# Patient Record
Sex: FEMALE | Race: WHITE | NOT HISPANIC OR LATINO | Employment: FULL TIME | URBAN - METROPOLITAN AREA
[De-identification: names, ages, dates, MRNs, and addresses within clinical notes are randomized per-mention and may not be internally consistent; named-entity substitution may affect disease eponyms.]

---

## 2017-01-19 ENCOUNTER — HOSPITAL ENCOUNTER (OUTPATIENT)
Facility: MEDICAL CENTER | Age: 52
End: 2017-01-19
Attending: OBSTETRICS & GYNECOLOGY
Payer: COMMERCIAL

## 2017-01-19 PROCEDURE — 88175 CYTOPATH C/V AUTO FLUID REDO: CPT

## 2017-01-19 PROCEDURE — 87624 HPV HI-RISK TYP POOLED RSLT: CPT

## 2017-01-21 LAB
CYTOLOGY REG CYTOL: NORMAL
HPV HR 12 DNA CVX QL NAA+PROBE: NEGATIVE
HPV16 DNA SPEC QL NAA+PROBE: NEGATIVE
HPV18 DNA SPEC QL NAA+PROBE: NEGATIVE
SPECIMEN SOURCE: NORMAL

## 2017-01-30 RX ORDER — MELOXICAM 15 MG/1
TABLET ORAL
Qty: 30 TAB | Refills: 1 | Status: SHIPPED | OUTPATIENT
Start: 2017-01-30 | End: 2017-04-11 | Stop reason: SDUPTHER

## 2017-04-04 RX ORDER — ALBUTEROL SULFATE 90 UG/1
AEROSOL, METERED RESPIRATORY (INHALATION)
Qty: 18 INHALER | Refills: 2 | Status: SHIPPED | OUTPATIENT
Start: 2017-04-04 | End: 2017-12-26 | Stop reason: SDUPTHER

## 2017-04-11 RX ORDER — MELOXICAM 15 MG/1
TABLET ORAL
Qty: 30 TAB | Refills: 5 | Status: SHIPPED | OUTPATIENT
Start: 2017-04-11 | End: 2017-04-26

## 2017-04-11 RX ORDER — LEVOTHYROXINE SODIUM 137 UG/1
TABLET ORAL
Qty: 30 TAB | Refills: 5 | Status: SHIPPED | OUTPATIENT
Start: 2017-04-11 | End: 2017-09-11

## 2017-04-11 RX ORDER — ESCITALOPRAM OXALATE 20 MG/1
TABLET ORAL
Qty: 30 TAB | Refills: 5 | Status: SHIPPED | OUTPATIENT
Start: 2017-04-11 | End: 2017-04-30

## 2017-04-26 ENCOUNTER — OFFICE VISIT (OUTPATIENT)
Dept: INTERNAL MEDICINE | Facility: IMAGING CENTER | Age: 52
End: 2017-04-26
Payer: COMMERCIAL

## 2017-04-26 VITALS
DIASTOLIC BLOOD PRESSURE: 74 MMHG | BODY MASS INDEX: 29.09 KG/M2 | SYSTOLIC BLOOD PRESSURE: 106 MMHG | WEIGHT: 181 LBS | OXYGEN SATURATION: 96 % | TEMPERATURE: 97 F | HEART RATE: 74 BPM | HEIGHT: 66 IN | RESPIRATION RATE: 14 BRPM

## 2017-04-26 DIAGNOSIS — E03.9 HYPOTHYROIDISM, UNSPECIFIED TYPE: ICD-10-CM

## 2017-04-26 DIAGNOSIS — E78.00 ELEVATED CHOLESTEROL: ICD-10-CM

## 2017-04-26 DIAGNOSIS — J45.909 MILD ASTHMA: ICD-10-CM

## 2017-04-26 DIAGNOSIS — Z00.00 ANNUAL PHYSICAL EXAM: ICD-10-CM

## 2017-04-26 DIAGNOSIS — E66.3 OVERWEIGHT: ICD-10-CM

## 2017-04-26 DIAGNOSIS — M54.9 MID BACK PAIN: ICD-10-CM

## 2017-04-26 PROCEDURE — 99396 PREV VISIT EST AGE 40-64: CPT | Performed by: FAMILY MEDICINE

## 2017-04-26 RX ORDER — DIAZEPAM 5 MG/1
5 TABLET ORAL EVERY 8 HOURS PRN
Qty: 30 TAB | Refills: 0 | Status: SHIPPED
Start: 2017-04-26 | End: 2018-04-05

## 2017-04-26 RX ORDER — CYCLOBENZAPRINE HCL 10 MG
10 TABLET ORAL 3 TIMES DAILY PRN
Qty: 30 TAB | Refills: 1 | Status: SHIPPED | OUTPATIENT
Start: 2017-04-26 | End: 2018-04-05

## 2017-04-26 RX ORDER — HYDROCODONE BITARTRATE AND ACETAMINOPHEN 10; 325 MG/1; MG/1
1 TABLET ORAL EVERY 6 HOURS PRN
Qty: 30 TAB | Refills: 0 | Status: SHIPPED | OUTPATIENT
Start: 2017-04-26 | End: 2017-12-06

## 2017-04-26 RX ORDER — ESCITALOPRAM OXALATE 10 MG/1
10 TABLET ORAL DAILY
Qty: 30 TAB | Refills: 1 | Status: SHIPPED | OUTPATIENT
Start: 2017-04-26 | End: 2017-07-24 | Stop reason: SDUPTHER

## 2017-04-26 ASSESSMENT — PATIENT HEALTH QUESTIONNAIRE - PHQ9: CLINICAL INTERPRETATION OF PHQ2 SCORE: 0

## 2017-04-26 NOTE — MR AVS SNAPSHOT
"        Ruby Beverlyhermilo   2017 10:30 AM   Office Visit   MRN: [id number]    Department:  [facility]   Dept Phone:  [phone]    Description:  Female : 1965   Provider:  Magda Wang M.D.           Allergies as of 2017     Allergen Noted Reactions    Cleocin [Clindamycin Hcl] 2009       Pcn [Penicillins] 2009       Pcn [Penicillins] 2011       Percocet [Oxycodone-Acetaminophen] 10/06/2016       itching      You were diagnosed with     Hypothyroidism, unspecified type   [6624419]       Elevated cholesterol   [773322]       Mid back pain   [[id number]]         Vital Signs     Blood Pressure Pulse Temperature Respirations Height Weight    106/74 mmHg 74 36.1 °C (97 °F) 14 1.676 m (5' 5.98\") 82.101 kg (181 lb)    Body Mass Index Oxygen Saturation Smoking Status             29.23 kg/m2 96% Never Smoker          Basic Information     Date Of Birth Sex Race Ethnicity Preferred Language    1965 Female White Non- English      Problem List              ICD-10-CM Priority Class Noted - Resolved    Hypothyroid E03.9   2009 - Present    Depression F32.9   2011 - Present    Obesity E66.9   2016 - Present      Health Maintenance        Date Due Completion Dates    MAMMOGRAM 2015, 3/21/2011, 3/5/2010, 3/5/2010, 2009, 2009, 2008, 2008, 2008    PAP SMEAR 2020, 2014 (Done), 2011 (Done)    Override on 2014: Done (Dr. Gonzalez)    Override on 2011: Done (Dr. Mayen office)    IMM DTaP/Tdap/Td Vaccine (2 - Td) 2024            Current Immunizations     Influenza Vaccine Adult HD 10/28/2014 10:09 AM    Tdap Vaccine 2014      Below and/or attached are the medications your provider expects you to take. Review all of your home medications and newly ordered medications with your provider and/or pharmacist. Follow medication instructions as directed by your provider and/or " pharmacist. Please keep your medication list with you and share with your provider. Update the information when medications are discontinued, doses are changed, or new medications (including over-the-counter products) are added; and carry medication information at all times in the event of emergency situations     Allergies:  CLEOCIN - (reactions not documented)     PCN - (reactions not documented)     PCN - (reactions not documented)     PERCOCET - (reactions not documented)               Medications  Valid as of: April 26, 2017 -  2:22 PM    Generic Name Brand Name Tablet Size Instructions for use    Albuterol Sulfate (Aero Soln) VENTOLIN  (90 BASE) MCG/ACT INHALE TWO PUFFS BY MOUTH EVERY 6 HOURS AS NEEDED FOR SHORTNESS OF BREATH        Cyclobenzaprine HCl (Tab) FLEXERIL 10 MG Take 1 Tab by mouth 3 times a day as needed.        DiazePAM (Tab) VALIUM 5 MG Take 1 Tab by mouth every 8 hours as needed (muscle spasm). As needed for severe spasms  Indications: Muscle Spasm        Diclofenac Sodium (Gel) Diclofenac Sodium 1 % Apply 3g to affected area tid.        Escitalopram Oxalate (Tab) LEXAPRO 20 MG TAKE ONE TABLET BY MOUTH DAILY        Escitalopram Oxalate (Tab) LEXAPRO 10 MG Take 1 Tab by mouth every day.        Hydrocodone-Acetaminophen (Tab) NORCO  MG Take 1 Tab by mouth every 6 hours as needed.        Levothyroxine Sodium (Tab) SYNTHROID 137 MCG TAKE ONE TABLET BY MOUTH DAILY        .                 Medicines prescribed today were sent to:     [id number] - [address] AT [address] [address]    Phone: [phone] Fax: [phone]    Open 24 Hours?: No      Medication refill instructions:       If your prescription bottle indicates you have medication refills left, it is not necessary to call your provider’s office. Please contact your pharmacy and they will refill your medication.    If your prescription bottle indicates you do not have any  refills left, you may request refills at any time through one of the following ways: The online [facility] system (except Urgent Care), by calling your provider’s office, or by asking your pharmacy to contact your provider’s office with a refill request. Medication refills are processed only during regular business hours and may not be available until the next business day. Your provider may request additional information or to have a follow-up visit with you prior to refilling your medication.   *Please Note: Medication refills are assigned a new Rx number when refilled electronically. Your pharmacy may indicate that no refills were authorized even though a new prescription for the same medication is available at the pharmacy. Please request the medicine by name with the pharmacy before contacting your provider for a refill.        Your To Do List     Future Labs/Procedures Complete By Expires    FREE THYROXINE  As directed 4/27/2018    LIPID PROFILE  As directed 4/27/2018    TSH  As directed 4/27/2018      Other Notes About Your Plan     Gyn: Dr. Lisa Andrews in 2014           [employer] Access Code: Activation code not generated  Current [facility] Status: Active

## 2017-04-30 PROBLEM — E78.00 ELEVATED CHOLESTEROL: Status: ACTIVE | Noted: 2017-04-30

## 2017-04-30 PROBLEM — M54.9 MID BACK PAIN: Status: ACTIVE | Noted: 2017-04-30

## 2017-04-30 PROBLEM — J45.909 MILD ASTHMA: Status: ACTIVE | Noted: 2017-04-30

## 2017-04-30 NOTE — PROGRESS NOTES
CC:  Annual exam.    HPI:  Ruby is a 51 y.o.Established female patient here for annual exam.She does see Dr. Gonzalez, and is up-to-date on her GYN exam.She will schedule her mammogram.    She is retired as a . She had lab work prior to retiring through her work and all was fine except her cholesterol is mildly elevated, total cholesterol 211, triglycerides 87, HDL 55, and . She has also been working on exercise and has lost 20 pounds. She is eating healthier.    She does have hypothyroidism. Is on replacement therapy. Denies any palpitations, or constipation.    She complains of bilateral knee pain, left more than right. She has been noticing this more, since she's been exercising. She is doing an exercise program, but has had doing quite a few deep knee bends and lunges. She does have some ongoing intermittent back pain. Rarely uses Norco for Valium for muscle relaxant.    She has been on Lexapro 20 mg for depression and anxiety. She states since stopping her job. She is doing much better. She would like to start tapering the Lexapro. She states her mood is good, motivation is good. She is coping with her stress much better. Her relationship is good. She has good support system.    She also has upper respiratory symptoms for the past 3 days. She does have some mild asthma that is exacerbated by upper respiratory infections. She has been using an albuterol inhaler just occasionally. No shortness of breath. No fevers or chills. Her drainage is clear.    Past Medical History   Diagnosis Date   • Depression    • ASTHMA    • Thyroid disease hyper s/p radioactive   • Hypothyroid 9/20/2009   • Hypothyroid    • Psychiatric problem    • Backpain    • Thoracic vertebral fracture (CMS-HCC) 11/11     trauma       Past Surgical History   Procedure Laterality Date   • Tonsillectomy     • Tubal coagulation laparoscopic bilateral     • Other neurological surg     • Pr reduction of large breast         Family  History   Problem Relation Age of Onset   • Diabetes Mother    • Heart Disease Mother      MI in 60's   • Heart Disease Maternal Grandmother    • Stroke Maternal Grandmother    • Stroke Father      TIA       Social History   Substance Use Topics   • Smoking status: Never Smoker    • Smokeless tobacco: Never Used   • Alcohol Use: Yes      Comment: occ     Counseling given: She has not been drinking alcohol with her weight loss program, and she does feel better.     Patient Active Problem List    Diagnosis Date Noted   • Elevated cholesterol 04/30/2017   • Mid back pain 04/30/2017   • Mild asthma 04/30/2017   • Hypothyroid 09/20/2009     Current Outpatient Prescriptions   Medication Sig Dispense Refill   • hydrocodone/acetaminophen (NORCO)  MG Tab Take 1 Tab by mouth every 6 hours as needed. 30 Tab 0   • escitalopram (LEXAPRO) 10 MG Tab Take 1 Tab by mouth every day. 30 Tab 1   • diazepam (VALIUM) 5 MG Tab Take 1 Tab by mouth every 8 hours as needed (muscle spasm). As needed for severe spasms  Indications: Muscle Spasm 30 Tab 0   • cyclobenzaprine (FLEXERIL) 10 MG Tab Take 1 Tab by mouth 3 times a day as needed. 30 Tab 1   • levothyroxine (SYNTHROID) 137 MCG Tab TAKE ONE TABLET BY MOUTH DAILY 30 Tab 5   • VENTOLIN  (90 BASE) MCG/ACT Aero Soln inhalation aerosol INHALE TWO PUFFS BY MOUTH EVERY 6 HOURS AS NEEDED FOR SHORTNESS OF BREATH 18 Inhaler 2   • Diclofenac Sodium 1 % Gel Apply 3g to affected area tid. 1 Tube 1     No current facility-administered medications for this visit.            REVIEW OF SYSTEMS:  GENERAL: No fatigue, Intentional weight loss.  HEENT:  Ears--no earache, no change in hearing, no dizziness, no tinnitus.                 Eyes--no blurred vision, no discharge or pain                 Throat--Mildsore throat, no dysphagia, no hoarseness  CV:  No chest pain,dyspnea,palpitations or edema.  RESP:  No sob,wheezing or hemoptysis.Nonproductive cough.  GI: No dysphagia, heartburn,abdominal  "pain, nausea, vomiting, diarrhea or constipation.       No melena, jaundice, bleeding, incontinence or change in bowel habits.  :  No dysuria, polyuria, hematuria, incontinence, or nocturia.  MS:Knee pain and back pain.  NEURO:  No seizures, syncope, paralysis, tremor, or weakness.  SKIN: No new or concerning skin lesions or changes.   PSYCH: Mood fine.          /74 mmHg  Pulse 74  Temp(Src) 36.1 °C (97 °F)  Resp 14  Ht 1.676 m (5' 5.98\")  Wt 82.101 kg (181 lb)  BMI 29.23 kg/m2  SpO2 96% Body mass index is 29.23 kg/(m^2).    PHYSICAL EXAM;  GENERAL;  WN/WD, No acute distress.   HEENT:  Head normocephalic and atraumatic.    PERRLA, EOMI. No conjunctival injection, no icterus.     TM's normal, nasal mucosa Is erythematous and edematous.     mouth with no abnormalities.    Oropharynx is clear with no lesions.  NECK: Supple, no adenopathy or thyromegaly.  CV: RR. Normal S1,S2. No murmur, gallop or rub.          No JVD, Carotid pulses 2+ and sym. No bruits.  LUNGS:  Clear, no wheezes, rales or rhonchi.  ABDOMEN: Soft, NT, nondistended. Bowel sounds normal. No hepatosplenomegaly or masses. No rebound or guarding. No hernias.  EXTREMITIES:  No edema.   Knees: There is no swelling or discoloration. She has full  range of motion. No tenderness to palpation. Ligaments are intact  NEURO:  CN II-XII intact. Motor and sensation grossly intact.   SKIN: No rashes or abnormal lesions.  Psych: Mood and affect are appropriate.    Reviewed her labs that she brought with her.  Repeat      Assessment and Plan. The following treatment and monitoring plan is recommended:   1. Annual physical exam  Continue GYN exams annually with Dr. Gonzalez. Encouraged monthly self breast exam and annual mammogram.    2. Hypothyroidism, unspecified type  Continue present replacement therapy.Monitor TSH.  - TSH; Future  - FREE THYROXINE; Future    3. Elevated cholesterol  Monitor lipid panel since she has been exercising and eating " better.  - LIPID PROFILE; Future    4. Mid back pain  Continue activities. Stretching. Rare use of Norco.May use Flexeril or Valium for muscle spasm. She is instructed not to take Valium with day and Norco.  - hydrocodone/acetaminophen (NORCO)  MG Tab; Take 1 Tab by mouth every 6 hours as needed.  Dispense: 30 Tab; Refill: 0  - cyclobenzaprine (FLEXERIL) 10 MG Tab; Take 1 Tab by mouth 3 times a day as needed.  Dispense: 30 Tab; Refill: 1    5. Overweight  Excellent weight loss. Continue efforts of healthy diet and exercise.    6. Mild asthma  Albuterol as needed. Monitor symptoms.    7. Uri. Symptomatic treatment.    8. Depression. We discussed slowly tapering her Lexapro with intent to be off of it. Over the next 6 weeks. She is encouraged to call for any concerns.    9. Bilateral knee pain. Encouraged her to modify her exercises and not to do deep knee bends. It does bother her. Consider physical therapy. She will consider and call.    10. Healthcare Maintenance. Counseled re: nutrition, activity and safety. Reviewed immunizations.     Followup 6 months and as needed.      ·

## 2017-07-24 ENCOUNTER — PATIENT MESSAGE (OUTPATIENT)
Dept: INTERNAL MEDICINE | Facility: IMAGING CENTER | Age: 52
End: 2017-07-24

## 2017-07-24 RX ORDER — ESCITALOPRAM OXALATE 10 MG/1
10 TABLET ORAL DAILY
Qty: 30 TAB | Refills: 6 | Status: SHIPPED | OUTPATIENT
Start: 2017-07-24 | End: 2018-04-05

## 2017-07-24 NOTE — TELEPHONE ENCOUNTER
From: Ruby Sue  To: Magda Wang M.D.  Sent: 7/24/2017 8:51 AM PDT  Subject: Prescription Question    I have found that with my lexapro, 10mg seems to be working the best. Would you change my RX to reflect that. I went down to 5mg but that didn't work.

## 2017-09-09 ENCOUNTER — HOSPITAL ENCOUNTER (OUTPATIENT)
Dept: LAB | Facility: MEDICAL CENTER | Age: 52
End: 2017-09-09
Attending: FAMILY MEDICINE
Payer: COMMERCIAL

## 2017-09-09 DIAGNOSIS — E03.9 HYPOTHYROIDISM, UNSPECIFIED TYPE: ICD-10-CM

## 2017-09-09 DIAGNOSIS — E78.00 ELEVATED CHOLESTEROL: ICD-10-CM

## 2017-09-09 LAB
CHOLEST SERPL-MCNC: 226 MG/DL (ref 100–199)
HDLC SERPL-MCNC: 58 MG/DL
LDLC SERPL CALC-MCNC: 154 MG/DL
T4 FREE SERPL-MCNC: 1.21 NG/DL (ref 0.53–1.43)
TRIGL SERPL-MCNC: 72 MG/DL (ref 0–149)
TSH SERPL DL<=0.005 MIU/L-ACNC: 4.49 UIU/ML (ref 0.3–3.7)

## 2017-09-09 PROCEDURE — 80061 LIPID PANEL: CPT

## 2017-09-09 PROCEDURE — 84443 ASSAY THYROID STIM HORMONE: CPT

## 2017-09-09 PROCEDURE — 84439 ASSAY OF FREE THYROXINE: CPT

## 2017-09-09 PROCEDURE — 36415 COLL VENOUS BLD VENIPUNCTURE: CPT

## 2017-09-11 ENCOUNTER — TELEPHONE (OUTPATIENT)
Dept: INTERNAL MEDICINE | Facility: IMAGING CENTER | Age: 52
End: 2017-09-11

## 2017-09-11 DIAGNOSIS — E03.9 HYPOTHYROIDISM, UNSPECIFIED TYPE: ICD-10-CM

## 2017-09-11 RX ORDER — LEVOTHYROXINE SODIUM 0.15 MG/1
150 TABLET ORAL
Qty: 30 TAB | Refills: 6 | Status: SHIPPED | OUTPATIENT
Start: 2017-09-11 | End: 2018-04-06 | Stop reason: SDUPTHER

## 2017-09-11 NOTE — TELEPHONE ENCOUNTER
Spoke with Maylin. Will increase levothyroxine to 150  micrograms daily and recheck lab work in 3 months.  Lipids are also higher --- counseled regarding diet and exercise, recheck this also in 3 months.

## 2017-11-29 DIAGNOSIS — M25.551 BILATERAL HIP PAIN: ICD-10-CM

## 2017-11-29 DIAGNOSIS — M25.552 BILATERAL HIP PAIN: ICD-10-CM

## 2017-11-30 ENCOUNTER — HOSPITAL ENCOUNTER (OUTPATIENT)
Dept: RADIOLOGY | Facility: MEDICAL CENTER | Age: 52
End: 2017-11-30
Attending: FAMILY MEDICINE
Payer: COMMERCIAL

## 2017-11-30 DIAGNOSIS — M25.552 BILATERAL HIP PAIN: ICD-10-CM

## 2017-11-30 DIAGNOSIS — M25.551 BILATERAL HIP PAIN: ICD-10-CM

## 2017-11-30 PROCEDURE — 73522 X-RAY EXAM HIPS BI 3-4 VIEWS: CPT

## 2017-12-04 ENCOUNTER — APPOINTMENT (OUTPATIENT)
Dept: INTERNAL MEDICINE | Facility: IMAGING CENTER | Age: 52
End: 2017-12-04
Payer: COMMERCIAL

## 2017-12-04 ENCOUNTER — HOSPITAL ENCOUNTER (OUTPATIENT)
Facility: MEDICAL CENTER | Age: 52
End: 2017-12-04
Attending: FAMILY MEDICINE
Payer: COMMERCIAL

## 2017-12-04 DIAGNOSIS — E03.9 HYPOTHYROIDISM, UNSPECIFIED TYPE: ICD-10-CM

## 2017-12-04 LAB
T4 FREE SERPL-MCNC: 1.33 NG/DL (ref 0.53–1.43)
TSH SERPL DL<=0.005 MIU/L-ACNC: 0.25 UIU/ML (ref 0.3–3.7)

## 2017-12-04 PROCEDURE — 84443 ASSAY THYROID STIM HORMONE: CPT

## 2017-12-04 PROCEDURE — 84439 ASSAY OF FREE THYROXINE: CPT

## 2017-12-06 ENCOUNTER — OFFICE VISIT (OUTPATIENT)
Dept: INTERNAL MEDICINE | Facility: IMAGING CENTER | Age: 52
End: 2017-12-06
Payer: COMMERCIAL

## 2017-12-06 VITALS
BODY MASS INDEX: 31.82 KG/M2 | OXYGEN SATURATION: 100 % | RESPIRATION RATE: 14 BRPM | HEIGHT: 66 IN | TEMPERATURE: 96.6 F | HEART RATE: 65 BPM | DIASTOLIC BLOOD PRESSURE: 74 MMHG | SYSTOLIC BLOOD PRESSURE: 102 MMHG | WEIGHT: 198 LBS

## 2017-12-06 DIAGNOSIS — M54.9 MID BACK PAIN: ICD-10-CM

## 2017-12-06 DIAGNOSIS — F33.41 RECURRENT MAJOR DEPRESSIVE DISORDER, IN PARTIAL REMISSION (HCC): ICD-10-CM

## 2017-12-06 DIAGNOSIS — E03.9 HYPOTHYROIDISM, UNSPECIFIED TYPE: ICD-10-CM

## 2017-12-06 DIAGNOSIS — M25.551 BILATERAL HIP PAIN: ICD-10-CM

## 2017-12-06 DIAGNOSIS — Z12.39 SCREENING BREAST EXAMINATION: ICD-10-CM

## 2017-12-06 DIAGNOSIS — M25.552 BILATERAL HIP PAIN: ICD-10-CM

## 2017-12-06 PROCEDURE — 99214 OFFICE O/P EST MOD 30 MIN: CPT | Performed by: FAMILY MEDICINE

## 2017-12-06 RX ORDER — IBUPROFEN 600 MG/1
600 TABLET ORAL 2 TIMES DAILY
Qty: 60 TAB | Refills: 1 | Status: SHIPPED | OUTPATIENT
Start: 2017-12-06 | End: 2018-04-05

## 2017-12-06 RX ORDER — OXYCODONE AND ACETAMINOPHEN 10; 325 MG/1; MG/1
1-2 TABLET ORAL EVERY 6 HOURS PRN
Qty: 30 TAB | Refills: 0 | Status: SHIPPED | OUTPATIENT
Start: 2017-12-06 | End: 2018-04-05

## 2017-12-06 RX ORDER — ESCITALOPRAM OXALATE 20 MG/1
20 TABLET ORAL DAILY
Qty: 30 TAB | Refills: 3 | Status: SHIPPED | OUTPATIENT
Start: 2017-12-06 | End: 2018-06-09 | Stop reason: SDUPTHER

## 2017-12-08 PROBLEM — F33.41 RECURRENT MAJOR DEPRESSIVE DISORDER, IN PARTIAL REMISSION (HCC): Status: ACTIVE | Noted: 2017-12-08

## 2017-12-08 NOTE — PROGRESS NOTES
Chief Complaint   Patient presents with   • Hip Pain       HISTORY OF PRESENT ILLNESS: Patient is a 51 y.o. female established patient who presents today to follow up on hip xrays and labwork.     She has been having more hip pain, R>L.   No new trauma. She did fall about 6 years ago and suffer a fx to T12. Treated conservatively but still some back pain intermittently--especially if she overdoes lifing and when weather colder.   Hips stiff in am's and with sitting for long period.   Advil helps a little.     Hypothyroid. TSH slightly suppressed. No palpitations.   Taking 150mcg. Was low on 137mcg.     Struggles with her weight. Trying to eat healthy . Limited exercise.     Mood: would like to increase lexapro back to 20mg. Hx of depression. Decreased it a few months ago and notices increased irritability, decreased motivation and decreased mood. Denies thoughts of hopelessness or suicide.   Good support system.     Pain: rarely take an opioid for pain in back and now in hips. Was taking percocet and noticed mild itching. Switched to norco and it causes nausea. Would like small rx for when pain bad at night.   Last filled norco April 2017, last filled oxy 10/16.   No aberrant behavior.           Patient Active Problem List    Diagnosis Date Noted   • Recurrent major depressive disorder, in partial remission (CMS-[facility]) 12/08/2017   • Elevated cholesterol 04/30/2017   • Mid back pain 04/30/2017   • Mild asthma 04/30/2017   • Hypothyroid 09/20/2009     Current Outpatient Prescriptions on File Prior to Visit   Medication Sig Dispense Refill   • levothyroxine (SYNTHROID) 150 MCG Tab Take 1 Tab by mouth Every morning on an empty stomach. 30 Tab 6   • escitalopram (LEXAPRO) 10 MG Tab Take 1 Tab by mouth every day. 30 Tab 6   • diazepam (VALIUM) 5 MG Tab Take 1 Tab by mouth every 8 hours as needed (muscle spasm). As needed for severe spasms  Indications: Muscle Spasm--RARE 30 Tab 0   • cyclobenzaprine (FLEXERIL) 10 MG Tab  "Take 1 Tab by mouth 3 times a day as needed. 30 Tab 1   • VENTOLIN  (90 BASE) MCG/ACT Aero Soln inhalation aerosol INHALE TWO PUFFS BY MOUTH EVERY 6 HOURS AS NEEDED FOR SHORTNESS OF BREATH 18 Inhaler 2   • Diclofenac Sodium 1 % Gel Apply 3g to affected area tid. 1 Tube 1     No current facility-administered medications on file prior to visit.          Past medical, surgical, family, and social history is reviewed and updated in Epic chart by me today.   Medications and allergies reviewed and updated in Epic chart by me today.     REVIEW OF SYSTEMS:  GENERAL: No fatigue. Slow weight gain.  CV:  No chest pain,dyspnea,palpitations or edema.  RESP:  No sob,cough,wheezing or hemoptysis.  GI: No dysphagia, heartburn,abdominal pain, nausea, vomiting, diarrhea or constipation.       No melena, jaundice, bleeding, incontinence or change in bowel habits.  :  No dysuria, polyuria, hematuria, incontinence, or nocturia.  MS:  bilat hip pain, low back pain.  NEURO:  No seizures, syncope, paralysis, tremor, or weakness.  SKIN: No new or concerning skin lesions or changes.   PSYCH: Mood fine.    Vitals:    12/06/17 1600   BP: 102/74   Pulse: 65   Resp: 14   Temp: 35.9 °C (96.6 °F)   SpO2: 100%   Weight: 89.8 kg (198 lb)   Height: 1.676 m (5' 5.98\")     Physical Exam:  Gen: overweight female. No acute distress.  Neck:  Supple, no adenopathy or thyromegaly.  Heart:  Regular rate and rhythm.  Normal S1, S2. No murmur, gallop or rub.  Lungs:  Clear, No wheezes,rales or rhonchi.  Back: mild tenderness lower thoracic and lumbar spine and ps muscles bilat.   FROM. Neuro in LE normal and symmetric.   Extremities:  No edema.  Hips: FROM bilat. No tenderness to palpation.  Psych: Mood and affect are appropriate.    Hip xray: Unremarkable.         Assessment/Plan:  1. Bilateral hip pain . Offered PT, she declines. Ibuprofen 600mg bid x 1 month. Gentle exercise and stretches. Weight loss. Refill oxycodone to use 1/2 tab sparingly at " night. Patient is stable on present pain medications. I believe the patient benefits from treatment with opioid medications. We reviewed the goal of opioid therapy is to improve pain as well as function and quality of life improvements. The patient continues to note benefit with current medical regimen. Takes medications as prescribed and displays no aberrant behavior. Will continue on present regimen and follow up 3 months.    oxycodone-acetaminophen (PERCOCET-10)  MG Tab   2. Mid back pain  oxycodone-acetaminophen (PERCOCET-10)  MG Tab   3. Recurrent major depressive disorder, in partial remission (CMS-HCC) . Increase lexapro to 20mg . Follow up 2-3 months.     4. Hypothyroidism, unspecified type. Continue present medication and recheck levels in 3 months.      5. BMI 31.0-31.9,adult . Obesity.  Counselled re: healthy diet, portion control and regular exercise.       6. Screening breast examination  MA-MAMMO SCREENING BILAT W/YESICA W/CAD      followup 2-3 months and prn.

## 2017-12-26 ENCOUNTER — PATIENT MESSAGE (OUTPATIENT)
Dept: INTERNAL MEDICINE | Facility: IMAGING CENTER | Age: 52
End: 2017-12-26

## 2017-12-26 RX ORDER — ALBUTEROL SULFATE 90 UG/1
2 AEROSOL, METERED RESPIRATORY (INHALATION) EVERY 4 HOURS PRN
Qty: 1 INHALER | Refills: 2 | Status: SHIPPED | OUTPATIENT
Start: 2017-12-26 | End: 2019-05-14 | Stop reason: SDUPTHER

## 2017-12-26 NOTE — TELEPHONE ENCOUNTER
From: Ruby Sue  To: Magda Wang M.D.  Sent: 12/26/2017 2:37 PM PST  Subject: Non-Urgent Medical Question    I need to get a refill on my inhaler. It wasn't in the list.

## 2018-01-02 ENCOUNTER — PATIENT MESSAGE (OUTPATIENT)
Dept: INTERNAL MEDICINE | Facility: IMAGING CENTER | Age: 53
End: 2018-01-02

## 2018-01-02 RX ORDER — DOXYCYCLINE HYCLATE 100 MG
100 TABLET ORAL 2 TIMES DAILY
Qty: 20 TAB | Refills: 0 | Status: SHIPPED | OUTPATIENT
Start: 2018-01-02 | End: 2018-04-05

## 2018-01-02 NOTE — TELEPHONE ENCOUNTER
From: Ruby Sue  To: Magda Wang M.D.  Sent: 1/2/2018 8:56 AM PST  Subject: Non-Urgent Medical Question    I have been sick since the week of Thanksgiving. It has just been a cold, but I am thinking maybe it is now a sinus infection. Would it be possible to get some antibiotics?

## 2018-04-05 ENCOUNTER — HOSPITAL ENCOUNTER (OUTPATIENT)
Facility: MEDICAL CENTER | Age: 53
End: 2018-04-05
Attending: FAMILY MEDICINE
Payer: COMMERCIAL

## 2018-04-05 ENCOUNTER — OFFICE VISIT (OUTPATIENT)
Dept: INTERNAL MEDICINE | Facility: IMAGING CENTER | Age: 53
End: 2018-04-05
Payer: COMMERCIAL

## 2018-04-05 VITALS
SYSTOLIC BLOOD PRESSURE: 108 MMHG | HEIGHT: 66 IN | DIASTOLIC BLOOD PRESSURE: 74 MMHG | HEART RATE: 74 BPM | OXYGEN SATURATION: 97 % | WEIGHT: 204 LBS | TEMPERATURE: 97.4 F | RESPIRATION RATE: 14 BRPM | BODY MASS INDEX: 32.78 KG/M2

## 2018-04-05 DIAGNOSIS — Z12.4 SCREENING FOR MALIGNANT NEOPLASM OF CERVIX: ICD-10-CM

## 2018-04-05 DIAGNOSIS — E78.00 ELEVATED CHOLESTEROL: ICD-10-CM

## 2018-04-05 DIAGNOSIS — R53.83 FATIGUE, UNSPECIFIED TYPE: ICD-10-CM

## 2018-04-05 DIAGNOSIS — Z01.419 ENCOUNTER FOR ANNUAL ROUTINE GYNECOLOGICAL EXAMINATION: ICD-10-CM

## 2018-04-05 DIAGNOSIS — Z12.11 SPECIAL SCREENING FOR MALIGNANT NEOPLASMS, COLON: ICD-10-CM

## 2018-04-05 DIAGNOSIS — F33.41 RECURRENT MAJOR DEPRESSIVE DISORDER, IN PARTIAL REMISSION (HCC): ICD-10-CM

## 2018-04-05 DIAGNOSIS — E03.9 HYPOTHYROIDISM, UNSPECIFIED TYPE: ICD-10-CM

## 2018-04-05 DIAGNOSIS — N81.6 RECTOCELE: ICD-10-CM

## 2018-04-05 LAB — CYTOLOGY REG CYTOL: NORMAL

## 2018-04-05 PROCEDURE — 88175 CYTOPATH C/V AUTO FLUID REDO: CPT

## 2018-04-05 PROCEDURE — 99396 PREV VISIT EST AGE 40-64: CPT | Performed by: FAMILY MEDICINE

## 2018-04-05 ASSESSMENT — PATIENT HEALTH QUESTIONNAIRE - PHQ9: CLINICAL INTERPRETATION OF PHQ2 SCORE: 0

## 2018-04-05 NOTE — PROGRESS NOTES
CC: GYN exam and Pap smear.    HPI:  Ruby is a 52 y.o. established female patient here for annual exam. She has been getting Pap smears with Dr. Gonzalez. Her last one was in January 2017. Pap smears have been normal.  Dr. Gonzalez has retired. She is here for her exam today. Periods are still roughly once a month. She states she did go for a 6 month period without menses. No hot flashes or night sweats.    She did notice in the shower about a week ago something that may be protruding from the vagina. She stopped a couple times since. She has had no female surgeries. She does tend a little bit or constipation. States she has a bowel movement every 2-3 days. Usually soft. She does sometimes strain.      Past Medical History:   Diagnosis Date   • ASTHMA    • Backpain    • Depression    • Hypothyroid 9/20/2009   • Hypothyroid    • Psychiatric problem    • Thoracic vertebral fracture (CMS-HCC) 11/11    trauma   • Thyroid disease hyper s/p radioactive       Past Surgical History:   Procedure Laterality Date   • OTHER NEUROLOGICAL SURG     • PB REDUCTION OF LARGE BREAST     • TONSILLECTOMY     • TUBAL COAGULATION LAPAROSCOPIC BILATERAL         Family History   Problem Relation Age of Onset   • Diabetes Mother    • Heart Disease Mother      MI in 60's   • Stroke Father      TIA   • Heart Disease Maternal Grandmother    • Stroke Maternal Grandmother        Social History   Substance Use Topics   • Smoking status: Never Smoker   • Smokeless tobacco: Never Used   • Alcohol use No     Counseling given: Not Answered     Patient Active Problem List    Diagnosis Date Noted   • BMI 32.0-32.9,adult 04/05/2018   • Recurrent major depressive disorder, in partial remission (CMS-HCC) 12/08/2017   • Elevated cholesterol 04/30/2017   • Mid back pain 04/30/2017   • Mild asthma 04/30/2017   • Hypothyroid 09/20/2009     Current Outpatient Prescriptions   Medication Sig Dispense Refill   • escitalopram (LEXAPRO) 20 MG tablet Take 1 Tab by mouth  "every day. 30 Tab 3   • levothyroxine (SYNTHROID) 150 MCG Tab Take 1 Tab by mouth Every morning on an empty stomach. 30 Tab 6   • albuterol (VENTOLIN HFA) 108 (90 Base) MCG/ACT Aero Soln inhalation aerosol Inhale 2 Puffs by mouth every four hours as needed for Shortness of Breath. 1 Inhaler 2   • Diclofenac Sodium 1 % Gel Apply 3g to affected area tid. 1 Tube 1     No current facility-administered medications for this visit.         REVIEW OF SYSTEMS:  GENERAL mild fatigue, no weight loss.  HEENT:  Ears--no earache, no change in hearing, no dizziness, no tinnitus.                 Eyes--no blurred vision, no discharge or pain                 Throat--No sore throat, no dysphagia, no hoarseness  CV:  No chest pain,dyspnea,palpitations or edema.  RESP:  No sob,cough,wheezing or hemoptysis.  GI: No dysphagia, heartburn,abdominal pain, nausea, vomiting, diarrhea or constipation.       No melena, jaundice, bleeding, incontinence or change in bowel habits.  : As above.  MS:  No joint swelling, myalgias, or arthralgias.  NEURO:  No seizures, syncope, paralysis, tremor, or weakness.  SKIN: No new or concerning skin lesions or changes.   PSYCH: Mood fine.--She is taking Lexapro. She feels that it is helping. She is having some marital difficulties. No thoughts of hopelessness or suicide.          /74   Pulse 74   Temp 36.3 °C (97.4 °F)   Resp 14   Ht 1.676 m (5' 5.98\")   Wt 92.5 kg (204 lb)   SpO2 97%   BMI 32.94 kg/m²  Body mass index is 32.94 kg/m².    PHYSICAL EXAM;  GENERAL; overweight female., No acute distress.   HEENT:  Head normocephalic and atraumatic.    PERRLA, EOMI. No conjunctival injection, no icterus.     TM's normal, nasal mucosa and mouth with no abnormalities.    Oropharynx is clear with no lesions.  NECK: Supple, no adenopathy or thyromegaly.  CV: RR. Normal S1,S2. No murmur, gallop or rub.          No JVD, Carotid pulses 2+ and sym. No bruits.  LUNGS:  Clear, no wheezes, rales or " rhonchi.  BREASTS: Symmetric, no masses or tenderness. No nipple discharge.  AXILLA:  No masses or tenderness.  ABDOMEN: Soft, NT, nondistended. Bowel sounds normal. No hepatosplenomegaly or masses. No rebound or guarding. No hernias.  : external genitalia normal with no lesion. Vagina with no lesions or discharge. Cervix nontender with no lesion. Uterus nontender and normal size. Adnexa nontender with no mass. She does have a small rectocele.  EXTREMITIES:  No edema.   NEURO:  CN II-XII intact. Motor and sensation grossly intact.   SKIN: No rashes or abnormal lesions.  Psych: Mood and affect are appropriate.            Assessment and Plan. The following treatment and monitoring plan is recommended:   1. Encounter for annual routine gynecological examination  Pap smear today. Recommend monthly self breast exams, annual mammogram.    2. Screening for malignant neoplasm of cervix      3. Rectocele  Reassurance, observation. Avoid constipation and straining. Kegel exercises daily.    4. Recurrent major depressive disorder, in partial remission (CMS-HCC)  Stable on Lexapro.    5. Elevated cholesterol    - COMP METABOLIC PANEL; Future  - LIPID PROFILE; Future    6. Hypothyroidism, unspecified type  Continue present replacement therapy  - TSH; Future  - FREE THYROXINE; Future    7. Special screening for malignant neoplasms, colon  Referral for colonoscopy.  - REFERRAL TO GASTROENTEROLOGY    8. Fatigue, unspecified type    - CBC WITH DIFFERENTIAL; Future    9. BMI 32.0-32.9,adult    - Patient identified as having weight management issue.  Appropriate orders and counseling given. Counseled regarding diet, exercise    10. Healthcare Maintenance. Counseled re: nutrition, activity and safety. Reviewed immunizations.   She'll follow-up 3 months after labwork.      ·

## 2018-04-06 DIAGNOSIS — E03.9 HYPOTHYROIDISM, UNSPECIFIED TYPE: ICD-10-CM

## 2018-04-06 RX ORDER — LEVOTHYROXINE SODIUM 0.15 MG/1
TABLET ORAL
Qty: 30 TAB | Refills: 5 | Status: SHIPPED | OUTPATIENT
Start: 2018-04-06 | End: 2018-04-27

## 2018-04-24 ENCOUNTER — NON-PROVIDER VISIT (OUTPATIENT)
Dept: INTERNAL MEDICINE | Facility: IMAGING CENTER | Age: 53
End: 2018-04-24
Payer: COMMERCIAL

## 2018-04-24 ENCOUNTER — HOSPITAL ENCOUNTER (OUTPATIENT)
Facility: MEDICAL CENTER | Age: 53
End: 2018-04-24
Attending: FAMILY MEDICINE
Payer: COMMERCIAL

## 2018-04-24 DIAGNOSIS — Z01.89 ROUTINE LAB DRAW: ICD-10-CM

## 2018-04-24 DIAGNOSIS — E03.9 HYPOTHYROIDISM, UNSPECIFIED TYPE: ICD-10-CM

## 2018-04-24 DIAGNOSIS — R53.83 FATIGUE, UNSPECIFIED TYPE: ICD-10-CM

## 2018-04-24 DIAGNOSIS — E78.00 ELEVATED CHOLESTEROL: ICD-10-CM

## 2018-04-24 LAB
ALBUMIN SERPL BCP-MCNC: 3.9 G/DL (ref 3.2–4.9)
ALBUMIN/GLOB SERPL: 1.3 G/DL
ALP SERPL-CCNC: 45 U/L (ref 30–99)
ALT SERPL-CCNC: 14 U/L (ref 2–50)
ANION GAP SERPL CALC-SCNC: 7 MMOL/L (ref 0–11.9)
AST SERPL-CCNC: 19 U/L (ref 12–45)
BASOPHILS # BLD AUTO: 0.5 % (ref 0–1.8)
BASOPHILS # BLD: 0.03 K/UL (ref 0–0.12)
BILIRUB SERPL-MCNC: 0.4 MG/DL (ref 0.1–1.5)
BUN SERPL-MCNC: 13 MG/DL (ref 8–22)
CALCIUM SERPL-MCNC: 8.9 MG/DL (ref 8.5–10.5)
CHLORIDE SERPL-SCNC: 104 MMOL/L (ref 96–112)
CHOLEST SERPL-MCNC: 188 MG/DL (ref 100–199)
CO2 SERPL-SCNC: 24 MMOL/L (ref 20–33)
CREAT SERPL-MCNC: 0.78 MG/DL (ref 0.5–1.4)
EOSINOPHIL # BLD AUTO: 0.02 K/UL (ref 0–0.51)
EOSINOPHIL NFR BLD: 0.4 % (ref 0–6.9)
ERYTHROCYTE [DISTWIDTH] IN BLOOD BY AUTOMATED COUNT: 40.5 FL (ref 35.9–50)
GLOBULIN SER CALC-MCNC: 3 G/DL (ref 1.9–3.5)
GLUCOSE SERPL-MCNC: 92 MG/DL (ref 65–99)
HCT VFR BLD AUTO: 40.7 % (ref 37–47)
HDLC SERPL-MCNC: 51 MG/DL
HGB BLD-MCNC: 13.8 G/DL (ref 12–16)
IMM GRANULOCYTES # BLD AUTO: 0.02 K/UL (ref 0–0.11)
IMM GRANULOCYTES NFR BLD AUTO: 0.4 % (ref 0–0.9)
LDLC SERPL CALC-MCNC: 122 MG/DL
LYMPHOCYTES # BLD AUTO: 1.74 K/UL (ref 1–4.8)
LYMPHOCYTES NFR BLD: 31.6 % (ref 22–41)
MCH RBC QN AUTO: 30.5 PG (ref 27–33)
MCHC RBC AUTO-ENTMCNC: 33.9 G/DL (ref 33.6–35)
MCV RBC AUTO: 90 FL (ref 81.4–97.8)
MONOCYTES # BLD AUTO: 0.47 K/UL (ref 0–0.85)
MONOCYTES NFR BLD AUTO: 8.5 % (ref 0–13.4)
NEUTROPHILS # BLD AUTO: 3.22 K/UL (ref 2–7.15)
NEUTROPHILS NFR BLD: 58.6 % (ref 44–72)
NRBC # BLD AUTO: 0 K/UL
NRBC BLD-RTO: 0 /100 WBC
PLATELET # BLD AUTO: 255 K/UL (ref 164–446)
PMV BLD AUTO: 10.6 FL (ref 9–12.9)
POTASSIUM SERPL-SCNC: 4.5 MMOL/L (ref 3.6–5.5)
PROT SERPL-MCNC: 6.9 G/DL (ref 6–8.2)
RBC # BLD AUTO: 4.52 M/UL (ref 4.2–5.4)
SODIUM SERPL-SCNC: 135 MMOL/L (ref 135–145)
T4 FREE SERPL-MCNC: 1.45 NG/DL (ref 0.53–1.43)
TRIGL SERPL-MCNC: 76 MG/DL (ref 0–149)
TSH SERPL DL<=0.005 MIU/L-ACNC: 0.22 UIU/ML (ref 0.38–5.33)
WBC # BLD AUTO: 5.5 K/UL (ref 4.8–10.8)

## 2018-04-24 PROCEDURE — 85025 COMPLETE CBC W/AUTO DIFF WBC: CPT

## 2018-04-24 PROCEDURE — 80053 COMPREHEN METABOLIC PANEL: CPT

## 2018-04-24 PROCEDURE — 80061 LIPID PANEL: CPT

## 2018-04-24 PROCEDURE — 84443 ASSAY THYROID STIM HORMONE: CPT

## 2018-04-24 PROCEDURE — 84439 ASSAY OF FREE THYROXINE: CPT

## 2018-04-27 RX ORDER — LEVOTHYROXINE SODIUM 137 UG/1
137 TABLET ORAL
Qty: 90 TAB | Refills: 1 | Status: SHIPPED | OUTPATIENT
Start: 2018-04-27 | End: 2018-12-17 | Stop reason: SDUPTHER

## 2018-06-11 RX ORDER — ESCITALOPRAM OXALATE 20 MG/1
TABLET ORAL
Qty: 30 TAB | Refills: 2 | Status: SHIPPED | OUTPATIENT
Start: 2018-06-11 | End: 2018-09-08 | Stop reason: SDUPTHER

## 2018-08-21 ENCOUNTER — PATIENT MESSAGE (OUTPATIENT)
Dept: INTERNAL MEDICINE | Facility: IMAGING CENTER | Age: 53
End: 2018-08-21

## 2018-08-21 DIAGNOSIS — E03.9 HYPOTHYROIDISM, UNSPECIFIED TYPE: ICD-10-CM

## 2018-08-21 NOTE — TELEPHONE ENCOUNTER
From: Ruby Sue  To: Magda Wang M.D.  Sent: 8/21/2018 8:53 AM PDT  Subject: Non-Urgent Medical Question    I think I am due to have my blood work done to check my thyroid again.

## 2018-08-23 ENCOUNTER — HOSPITAL ENCOUNTER (OUTPATIENT)
Dept: LAB | Facility: MEDICAL CENTER | Age: 53
End: 2018-08-23
Attending: FAMILY MEDICINE
Payer: COMMERCIAL

## 2018-08-23 DIAGNOSIS — E03.9 HYPOTHYROIDISM, UNSPECIFIED TYPE: ICD-10-CM

## 2018-08-23 LAB
T4 FREE SERPL-MCNC: 1.04 NG/DL (ref 0.53–1.43)
TSH SERPL DL<=0.005 MIU/L-ACNC: 2.53 UIU/ML (ref 0.38–5.33)

## 2018-08-23 PROCEDURE — 84443 ASSAY THYROID STIM HORMONE: CPT

## 2018-08-23 PROCEDURE — 36415 COLL VENOUS BLD VENIPUNCTURE: CPT

## 2018-08-23 PROCEDURE — 84439 ASSAY OF FREE THYROXINE: CPT

## 2018-09-07 ENCOUNTER — OFFICE VISIT (OUTPATIENT)
Dept: INTERNAL MEDICINE | Facility: IMAGING CENTER | Age: 53
End: 2018-09-07
Payer: COMMERCIAL

## 2018-09-07 VITALS
HEIGHT: 66 IN | TEMPERATURE: 97.6 F | SYSTOLIC BLOOD PRESSURE: 110 MMHG | RESPIRATION RATE: 14 BRPM | OXYGEN SATURATION: 97 % | WEIGHT: 220 LBS | HEART RATE: 69 BPM | DIASTOLIC BLOOD PRESSURE: 64 MMHG | BODY MASS INDEX: 35.36 KG/M2

## 2018-09-07 DIAGNOSIS — N76.0 BACTERIAL VAGINOSIS: ICD-10-CM

## 2018-09-07 DIAGNOSIS — B96.89 BACTERIAL VAGINOSIS: ICD-10-CM

## 2018-09-07 PROCEDURE — 99214 OFFICE O/P EST MOD 30 MIN: CPT | Performed by: FAMILY MEDICINE

## 2018-09-07 RX ORDER — PHENTERMINE HYDROCHLORIDE 37.5 MG/1
37.5 TABLET ORAL
Qty: 30 TAB | Refills: 0 | Status: SHIPPED
Start: 2018-09-07 | End: 2018-10-06 | Stop reason: SDUPTHER

## 2018-09-07 RX ORDER — METRONIDAZOLE 500 MG/1
500 TABLET ORAL 2 TIMES DAILY
Qty: 14 TAB | Refills: 0 | Status: SHIPPED | OUTPATIENT
Start: 2018-09-07 | End: 2018-10-23

## 2018-09-07 NOTE — PROGRESS NOTES
Chief Complaint   Patient presents with   • Obesity   • Other     vaginal odor x 3 months       HISTORY OF PRESENT ILLNESS: Patient is a 52 y.o. female established patient who presents today with concerns about her weight.  She has gained about 30 pounds this past year.  She blames some of it on a job switch.  Her job is much more sedentary now.  She is working for an accounting firm.  She is not exercising regularly.  She feels that she is eating pretty healthy.  She does feel there is some room for improvement.  She has lost weight in the past.  She states she lost about 20 pounds using a boot camp exercise type challenge program.    She is also complaining of vaginal odor for the past 3 months.  Rarely a little bit of discharge.  No other symptoms.  She is in a monogamous relationship.  Not worried about STDs.  Her Pap smear was normal in April of this year.  She denies any urinary symptoms.    Patient Active Problem List    Diagnosis Date Noted   • BMI 32.0-32.9,adult 04/05/2018   • Recurrent major depressive disorder, in partial remission (HCC) 12/08/2017   • Elevated cholesterol 04/30/2017   • Mid back pain 04/30/2017   • Mild asthma 04/30/2017   • Hypothyroid 09/20/2009       Current Outpatient Prescriptions:     •  escitalopram (LEXAPRO) 20 MG tablet, TAKE ONE TABLET BY MOUTH DAILY, Disp: 30 Tab, Rfl: 2  •  levothyroxine (SYNTHROID) 137 MCG Tab, Take 1 Tab by mouth Every morning on an empty stomach., Disp: 90 Tab, Rfl: 1  •  albuterol (VENTOLIN HFA) 108 (90 Base) MCG/ACT Aero Soln inhalation aerosol, Inhale 2 Puffs by mouth every four hours as needed for Shortness of Breath., Disp: 1 Inhaler, Rfl: 2      Past medical, surgical, family, and social history is reviewed and updated in Epic chart by me today.   Medications and allergies reviewed and updated in Epic chart by me today.     REVIEW OF SYSTEMS:  GENERAL: No fatigue.  Weight gain as above.  HEENT:  Ears--no earache, no change in hearing, no dizziness,  "no tinnitus.                 Eyes--no blurred vision, no discharge or pain                 Throat--No sore throat, no dysphagia, no hoarseness  CV:  No chest pain,dyspnea,palpitations or edema.  RESP:  No sob,cough,wheezing or hemoptysis.  GI: No dysphagia, heartburn,abdominal pain, nausea, vomiting, diarrhea or constipation.       No melena, jaundice, bleeding, incontinence or change in bowel habits.  :  No dysuria, polyuria, hematuria, incontinence, or nocturia.  GYN symptoms as above.  MS:  No joint swelling, myalgias, or arthralgias.  NEURO:  No seizures, syncope, paralysis, tremor, or weakness.  SKIN: No new or concerning skin lesions or changes.   PSYCH: Mood fine.    Vitals:    09/07/18 1000   BP: 110/64   Pulse: 69   Resp: 14   Temp: 36.4 °C (97.6 °F)   SpO2: 97%   Weight: 99.8 kg (220 lb)   Height: 1.676 m (5' 5.98\")     Physical Exam:  Gen: Obese female.  No acute distress.  Neck:  Supple, no adenopathy or thyromegaly.  Heart:  Regular rate and rhythm.  Normal S1, S2. No murmur, gallop or rub.  Lungs:  Clear, No wheezes,rales or rhonchi.  Abdomen: Soft, nontender, nondistended. Normal bowel sounds. No hepatosplenomegaly or masses, or hernias. No rebound or guarding.  : external genitalia normal with no lesion. Vagina with no lesions.  She does have a small amount of clear discharge.  Cervix nontender with no lesion. Uterus nontender and normal size. Adnexa nontender with no mass.   Extremities:  No edema.  Psych: Mood and affect are appropriate.          Assessment/Plan:  1. Bacterial vaginosis.  From her symptoms and exam I suspect she has bacterial vaginosis and will treat with a 5 day course of metronidazole 500 mg twice daily.  She is to call for persistent symptoms.    2. BMI 35.0-35.9,adult.  Counseled regarding diet, exercise.  Recommend a 1500-calorie diet, Mediterranean  diet, 30-45 minutes of exercise 4-5 times a week--encouraged her to start with a walking program.  Will add phentermine 37.5 " mg.  One half tablet each morning.  Discussed potential side effects, cautioned against other stimulants.  Follow-up in 6 weeks phentermine (ADIPEX-P) 37.5 MG tablet

## 2018-09-10 RX ORDER — ESCITALOPRAM OXALATE 20 MG/1
TABLET ORAL
Qty: 90 TAB | Refills: 1 | Status: SHIPPED | OUTPATIENT
Start: 2018-09-10 | End: 2019-03-09 | Stop reason: SDUPTHER

## 2018-09-12 ENCOUNTER — OFFICE VISIT (OUTPATIENT)
Dept: URGENT CARE | Facility: PHYSICIAN GROUP | Age: 53
End: 2018-09-12
Payer: COMMERCIAL

## 2018-09-12 ENCOUNTER — HOSPITAL ENCOUNTER (OUTPATIENT)
Dept: RADIOLOGY | Facility: MEDICAL CENTER | Age: 53
End: 2018-09-12
Attending: PHYSICIAN ASSISTANT
Payer: COMMERCIAL

## 2018-09-12 VITALS
WEIGHT: 218 LBS | DIASTOLIC BLOOD PRESSURE: 62 MMHG | HEIGHT: 66 IN | OXYGEN SATURATION: 96 % | BODY MASS INDEX: 35.03 KG/M2 | TEMPERATURE: 98.4 F | HEART RATE: 81 BPM | SYSTOLIC BLOOD PRESSURE: 102 MMHG | RESPIRATION RATE: 16 BRPM

## 2018-09-12 DIAGNOSIS — M79.641 RIGHT HAND PAIN: ICD-10-CM

## 2018-09-12 PROCEDURE — 99214 OFFICE O/P EST MOD 30 MIN: CPT | Performed by: PHYSICIAN ASSISTANT

## 2018-09-12 PROCEDURE — 73130 X-RAY EXAM OF HAND: CPT | Mod: RT

## 2018-09-12 ASSESSMENT — ENCOUNTER SYMPTOMS
WEAKNESS: 0
VOMITING: 0
NECK PAIN: 0
NAUSEA: 0
VERTIGO: 0
MYALGIAS: 1
JOINT SWELLING: 0
VISUAL CHANGE: 0
ABDOMINAL PAIN: 0
NUMBNESS: 0

## 2018-09-13 NOTE — PROGRESS NOTES
"Subjective:      Ruby Sue is a 52 y.o. female who presents with Hand Injury (R hand injury x 6 weeks still painful)            Hand Injury   This is a new problem. The current episode started more than 1 month ago. The problem occurs rarely. The problem has been waxing and waning. Associated symptoms include myalgias. Pertinent negatives include no abdominal pain, joint swelling, nausea, neck pain, numbness, vertigo, visual change, vomiting or weakness. Nothing aggravates the symptoms. She has tried nothing for the symptoms. The treatment provided no relief.     Past medical history: Is not pertinent to this examination  Past surgical history: Not pertinent to this examination  Family history: Is not pertinent to this examination  Allergies:Cleocin [clindamycin hcl]; Pcn [penicillins]; Pcn [penicillins]; and Percocet [oxycodone-acetaminophen]  Social history: Is reviewed in Epic      Review of Systems   Gastrointestinal: Negative for abdominal pain, nausea and vomiting.   Musculoskeletal: Positive for myalgias. Negative for joint swelling and neck pain.   Neurological: Negative for vertigo, weakness and numbness.          Objective:     /62   Pulse 81   Temp 36.9 °C (98.4 °F)   Resp 16   Ht 1.676 m (5' 6\")   Wt 98.9 kg (218 lb)   SpO2 96%   BMI 35.19 kg/m²      Physical Exam   Constitutional: She is oriented to person, place, and time. She appears well-developed and well-nourished.   HENT:   Head: Normocephalic and atraumatic.   Eyes: Pupils are equal, round, and reactive to light. EOM are normal.   Neck: Normal range of motion.   Cardiovascular: Normal rate, regular rhythm and normal heart sounds.    Pulmonary/Chest: Effort normal.   Abdominal: Soft.   Musculoskeletal: Normal range of motion.        Hands:  Patient has some minimal pain to palpation in the dorsal aspect of the right hand in the fifth carpal region. There is no bruising or swelling noted. She has full range of motion of 5 out of " 5 strength against resistance. Her fingers and joints such as MCP and PIP and DIP joints are not affected.  Neurovascular intact with a 2 second cap refill good distal pulses and normal sensation   Neurological: She is alert and oriented to person, place, and time.          Urgent care course: X-ray of the hand is neegative     Assessment/Plan:     1. Right hand pain  Discussed musculoskeletal etiology. Follow-up as needed. Patient has full range of motion and normal strength. Pulses needed  - DX-HAND 3+ RIGHT; Future

## 2018-10-09 RX ORDER — PHENTERMINE HYDROCHLORIDE 37.5 MG/1
TABLET ORAL
Qty: 30 TAB | Refills: 0 | Status: SHIPPED
Start: 2018-10-09 | End: 2018-11-05 | Stop reason: SDUPTHER

## 2018-10-22 ENCOUNTER — HOSPITAL ENCOUNTER (OUTPATIENT)
Dept: RADIOLOGY | Facility: MEDICAL CENTER | Age: 53
End: 2018-10-22
Attending: FAMILY MEDICINE
Payer: COMMERCIAL

## 2018-10-22 DIAGNOSIS — Z12.39 SCREENING BREAST EXAMINATION: ICD-10-CM

## 2018-10-22 PROCEDURE — 77067 SCR MAMMO BI INCL CAD: CPT

## 2018-10-23 ENCOUNTER — OFFICE VISIT (OUTPATIENT)
Dept: INTERNAL MEDICINE | Facility: IMAGING CENTER | Age: 53
End: 2018-10-23
Payer: COMMERCIAL

## 2018-10-23 VITALS
SYSTOLIC BLOOD PRESSURE: 104 MMHG | WEIGHT: 214 LBS | RESPIRATION RATE: 16 BRPM | BODY MASS INDEX: 34.39 KG/M2 | TEMPERATURE: 98.2 F | DIASTOLIC BLOOD PRESSURE: 80 MMHG | OXYGEN SATURATION: 98 % | HEIGHT: 66 IN | HEART RATE: 88 BPM

## 2018-10-23 PROCEDURE — 99212 OFFICE O/P EST SF 10 MIN: CPT | Performed by: FAMILY MEDICINE

## 2018-10-23 NOTE — PROGRESS NOTES
Chief Complaint   Patient presents with   • Obesity     Follow-up on weight loss.       HISTORY OF PRESENT ILLNESS: Patient is a 52 y.o. female established patient who presents today to follow-up on obesity.  She is taking phentermine 37.5 mg.  She states she is taking a whole tablet daily.  She thinks it has helped.  On her scale at home she has lost 10 pounds.  On ours she has only lost 4 but she states with her last weight a few pounds were taken off her clothing.  She is monitoring her food intake.  She does well at breakfast and lunch but states dinner is a bit more erratic and often she is eating it quite late somewhere between 7 and 9.  She also states that sometimes dinner to ends up being more snacks.  She is trying to walk 2-1/2 miles during her lunch hour.  She is not drinking any sodas except an occasional Red BUll.  She is having alcohol a couple times a week.  She admits this last week she did drink more because she had company.      Patient Active Problem List    Diagnosis Date Noted   • BMI 32.0-32.9,adult 04/05/2018   • Recurrent major depressive disorder, in partial remission (HCC) 12/08/2017   • Elevated cholesterol 04/30/2017   • Mid back pain 04/30/2017   • Mild asthma 04/30/2017   • Hypothyroid 09/20/2009     Current Outpatient Prescriptions on File Prior to Visit   Medication Sig Dispense Refill   • phentermine (ADIPEX-P) 37.5 MG tablet TAKE ONE TABLET BY MOUTH EVERY MORNING BEFORE BREAKFAST FOR 30 DAYS 30 Tab 0   • escitalopram (LEXAPRO) 20 MG tablet TAKE ONE TABLET BY MOUTH DAILY 90 Tab 1   • levothyroxine (SYNTHROID) 137 MCG Tab Take 1 Tab by mouth Every morning on an empty stomach. 90 Tab 1   • albuterol (VENTOLIN HFA) 108 (90 Base) MCG/ACT Aero Soln inhalation aerosol Inhale 2 Puffs by mouth every four hours as needed for Shortness of Breath. 1 Inhaler 2     No current facility-administered medications on file prior to visit.          Past medical, surgical, family, and social history is  "reviewed and updated in Epic chart by me today.   Medications and allergies reviewed and updated in Epic chart by me today.     REVIEW OF SYSTEMS:  GENERAL: No fatigue, intentional weight loss.  HEENT:  Ears--no earache, no change in hearing, no dizziness, no tinnitus.                 Eyes--no blurred vision, no discharge or pain                 Throat--No sore throat, no dysphagia, no hoarseness  CV:  No chest pain,dyspnea,palpitations or edema.  RESP:  No sob,cough,wheezing or hemoptysis.  GI: No dysphagia, heartburn,abdominal pain, nausea, vomiting, diarrhea or constipation.       No melena, jaundice, bleeding, incontinence or change in bowel habits.  :  No dysuria, polyuria, hematuria, incontinence, or nocturia.  MS:  No joint swelling, myalgias, or arthralgias.  NEURO:  No seizures, syncope, paralysis, tremor, or weakness.  SKIN: No new or concerning skin lesions or changes.   PSYCH: Mood fine.    Vitals:    10/23/18 0830   BP: 104/80   BP Location: Left arm   Patient Position: Sitting   BP Cuff Size: Adult   Pulse: 88   Resp: 16   Temp: 36.8 °C (98.2 °F)   TempSrc: Temporal   SpO2: 98%   Weight: 97.1 kg (214 lb)   Height: 1.676 m (5' 6\")     Physical Exam:  Gen: overweight female.  No acute distress.  Neck:  Supple, no adenopathy or thyromegaly.  Heart:  Regular rate and rhythm.  Normal S1, S2. No murmur, gallop or rub.  Lungs:  Clear, No wheezes,rales or rhonchi.  Extremities:  No edema.  Psych: Mood and affect are appropriate.          Assessment/Plan:  1. BMI 34.0-34.9,adult     Obesity.  Counselled re: healthy diet, portion control and regular exercise.   We discussed her diet, portion control she will continue her exercise.  She may continue with phentermine.  She will follow-up in 6 weeks.  She is instructed not to drink the red ball or caffeinated beverages with her medication.  Encouraged her to limit her alcohol.  Follow-up here in 6 weeks     "

## 2018-11-05 RX ORDER — PHENTERMINE HYDROCHLORIDE 37.5 MG/1
TABLET ORAL
Qty: 30 TAB | Refills: 0 | Status: SHIPPED
Start: 2018-11-05 | End: 2018-12-05

## 2018-12-17 RX ORDER — LEVOTHYROXINE SODIUM 137 UG/1
TABLET ORAL
Qty: 90 TAB | Refills: 3 | Status: SHIPPED | OUTPATIENT
Start: 2018-12-17 | End: 2019-12-08 | Stop reason: SDUPTHER

## 2019-01-16 ENCOUNTER — OFFICE VISIT (OUTPATIENT)
Dept: INTERNAL MEDICINE | Facility: IMAGING CENTER | Age: 54
End: 2019-01-16
Payer: COMMERCIAL

## 2019-01-16 VITALS
SYSTOLIC BLOOD PRESSURE: 120 MMHG | BODY MASS INDEX: 34.39 KG/M2 | HEART RATE: 74 BPM | TEMPERATURE: 97.5 F | HEIGHT: 66 IN | WEIGHT: 214 LBS | DIASTOLIC BLOOD PRESSURE: 74 MMHG | RESPIRATION RATE: 14 BRPM | OXYGEN SATURATION: 97 %

## 2019-01-16 DIAGNOSIS — R20.2 TINGLING IN EXTREMITIES: ICD-10-CM

## 2019-01-16 DIAGNOSIS — R20.2 TINGLING OF FACE: ICD-10-CM

## 2019-01-16 PROCEDURE — 99213 OFFICE O/P EST LOW 20 MIN: CPT | Performed by: FAMILY MEDICINE

## 2019-01-16 NOTE — PROGRESS NOTES
Chief Complaint   Patient presents with   • Numbness     both arms and right face       HISTORY OF PRESENT ILLNESS: Patient is a 53 y.o. female established patient who presents today Complaining of tingling in her right arm and right lower cheek that started yesterday.  She states she was at lunch and felt some tingling in her right arm that does start at her neck and goes down to her hand.  Also some tingling along her lower cheek on the right.  It has waxed and waned over the past 24 hours.  She has had a little dizziness intermittently.  And then just earlier this morning she felt some tingling in her left arm.  No numbness.  No chest pain.  No palpitations.  No fevers or chills.  No weakness.  No other neurologic symptoms.  She describes the dizziness as brief and feels like she is unsteady.  She has had some sinus symptoms.  She feels some congestion.    No change in medications.  No injuries to her neck.  No head trauma.  She states she had something like this several years ago and had some tests done and no etiology was ever found.      Patient Active Problem List    Diagnosis Date Noted   • BMI 32.0-32.9,adult 04/05/2018   • Recurrent major depressive disorder, in partial remission (HCC) 12/08/2017   • Elevated cholesterol 04/30/2017   • Mid back pain 04/30/2017   • Mild asthma 04/30/2017   • Hypothyroid 09/20/2009     Current Outpatient Prescriptions on File Prior to Visit   Medication Sig Dispense Refill   • levothyroxine (SYNTHROID) 137 MCG Tab TAKE ONE TABLET BY MOUTH EVERY MORNING ON AN EMPTY STOMACH. 90 Tab 3   • escitalopram (LEXAPRO) 20 MG tablet TAKE ONE TABLET BY MOUTH DAILY 90 Tab 1   • albuterol (VENTOLIN HFA) 108 (90 Base) MCG/ACT Aero Soln inhalation aerosol Inhale 2 Puffs by mouth every four hours as needed for Shortness of Breath. 1 Inhaler 2     No current facility-administered medications on file prior to visit.          Past medical, surgical, family, and social history is reviewed and  "updated in Epic chart by me today.   Medications and allergies reviewed and updated in Epic chart by me today.     REVIEW OF SYSTEMS:  GENERAL: No fatigue, no weight loss.  HEENT:  Ears--no earache, no change in hearing, n, no tinnitus. Dizziness.                  Eyes--no blurred vision, no discharge or pain                 Throat--No sore throat, no dysphagia, no hoarseness  CV:  No chest pain,dyspnea,palpitations or edema.  RESP:  No sob,cough,wheezing or hemoptysis.  GI: No dysphagia, heartburn,abdominal pain, nausea, vomiting, diarrhea or constipation.       No melena, jaundice, bleeding, incontinence or change in bowel habits.  :  No dysuria, polyuria, hematuria, incontinence, or nocturia.  MS:  No joint swelling, myalgias, or arthralgias.  NEURO:  No seizures, syncope, paralysis, tremor, or weakness. Tingling as above.  SKIN: No new or concerning skin lesions or changes.   PSYCH: Mood fine.    Vitals:    01/16/19 1300   BP: 120/74   Pulse: 74   Resp: 14   Temp: 36.4 °C (97.5 °F)   TempSrc: Temporal   SpO2: 97%   Weight: 97.1 kg (214 lb)   Height: 1.676 m (5' 5.98\")       Physical Exam:  GENERAL; overweight female. no acute distress.  HEENT:  PERRLA, EOMI, no conjuctival injection, no icterus.                 TM's normal bilat.                 Nasal mucosa normal                 Pharynx clear.  No exudate.  NECK: Supple with no adenopathy or thyromegaly.  No carotid bruits.  Full range of motion of her neck.  Her C-spine is nontender.  LUNGS: Clear with no rales, rhonchi, or wheezes.  COR: RR with no murmur, gallop or rub.  Neuro: Cranial nerves II through XII are intact.  Motor and sensation is grossly intact.  Muscle strength is 5/5 and symmetric in her upper extremities.  Her gait is normal.  Her exam is nonfocal  EXT: No edema.  Psych: Mood and affect are appropriate.            Assessment/Plan:  1. Tingling in extremities     2. Tingling of face     Uncertain etiology.  No evidence of trauma or " hyperventilation.  Encourage adequate fluids.  She may use Nasacort for her dizziness and sinus congestion.  She is to monitor symptoms and call for any changes or if symptoms have not resolved within the next few days.

## 2019-01-31 DIAGNOSIS — E03.9 HYPOTHYROIDISM, UNSPECIFIED TYPE: ICD-10-CM

## 2019-01-31 DIAGNOSIS — B96.89 BACTERIAL VAGINOSIS: ICD-10-CM

## 2019-01-31 DIAGNOSIS — R20.2 TINGLING IN EXTREMITIES: ICD-10-CM

## 2019-01-31 DIAGNOSIS — N76.0 BACTERIAL VAGINOSIS: ICD-10-CM

## 2019-01-31 RX ORDER — METRONIDAZOLE 500 MG/1
500 TABLET ORAL
Qty: 14 TAB | Refills: 0 | Status: SHIPPED | OUTPATIENT
Start: 2019-01-31 | End: 2019-02-07

## 2019-02-01 ENCOUNTER — HOSPITAL ENCOUNTER (OUTPATIENT)
Facility: MEDICAL CENTER | Age: 54
End: 2019-02-01
Attending: FAMILY MEDICINE
Payer: COMMERCIAL

## 2019-02-01 ENCOUNTER — NON-PROVIDER VISIT (OUTPATIENT)
Dept: INTERNAL MEDICINE | Facility: IMAGING CENTER | Age: 54
End: 2019-02-01
Payer: COMMERCIAL

## 2019-02-01 DIAGNOSIS — R20.2 TINGLING IN EXTREMITIES: ICD-10-CM

## 2019-02-01 DIAGNOSIS — E03.9 HYPOTHYROIDISM, UNSPECIFIED TYPE: ICD-10-CM

## 2019-02-01 LAB
25(OH)D3 SERPL-MCNC: 20 NG/ML (ref 30–100)
ALBUMIN SERPL BCP-MCNC: 4.2 G/DL (ref 3.2–4.9)
ALBUMIN/GLOB SERPL: 1.5 G/DL
ALP SERPL-CCNC: 53 U/L (ref 30–99)
ALT SERPL-CCNC: 20 U/L (ref 2–50)
ANION GAP SERPL CALC-SCNC: 7 MMOL/L (ref 0–11.9)
AST SERPL-CCNC: 21 U/L (ref 12–45)
BASOPHILS # BLD AUTO: 0.7 % (ref 0–1.8)
BASOPHILS # BLD: 0.06 K/UL (ref 0–0.12)
BILIRUB SERPL-MCNC: 0.6 MG/DL (ref 0.1–1.5)
BUN SERPL-MCNC: 13 MG/DL (ref 8–22)
CALCIUM SERPL-MCNC: 9.2 MG/DL (ref 8.5–10.5)
CHLORIDE SERPL-SCNC: 103 MMOL/L (ref 96–112)
CO2 SERPL-SCNC: 26 MMOL/L (ref 20–33)
CREAT SERPL-MCNC: 0.75 MG/DL (ref 0.5–1.4)
EOSINOPHIL # BLD AUTO: 0.06 K/UL (ref 0–0.51)
EOSINOPHIL NFR BLD: 0.7 % (ref 0–6.9)
ERYTHROCYTE [DISTWIDTH] IN BLOOD BY AUTOMATED COUNT: 46.4 FL (ref 35.9–50)
ERYTHROCYTE [SEDIMENTATION RATE] IN BLOOD BY WESTERGREN METHOD: 15 MM/HOUR (ref 0–30)
GLOBULIN SER CALC-MCNC: 2.8 G/DL (ref 1.9–3.5)
GLUCOSE SERPL-MCNC: 98 MG/DL (ref 65–99)
HCT VFR BLD AUTO: 48.9 % (ref 37–47)
HGB BLD-MCNC: 15.9 G/DL (ref 12–16)
IMM GRANULOCYTES # BLD AUTO: 0.03 K/UL (ref 0–0.11)
IMM GRANULOCYTES NFR BLD AUTO: 0.3 % (ref 0–0.9)
LYMPHOCYTES # BLD AUTO: 2.4 K/UL (ref 1–4.8)
LYMPHOCYTES NFR BLD: 27.1 % (ref 22–41)
MCH RBC QN AUTO: 31.6 PG (ref 27–33)
MCHC RBC AUTO-ENTMCNC: 32.5 G/DL (ref 33.6–35)
MCV RBC AUTO: 97.2 FL (ref 81.4–97.8)
MONOCYTES # BLD AUTO: 0.64 K/UL (ref 0–0.85)
MONOCYTES NFR BLD AUTO: 7.2 % (ref 0–13.4)
NEUTROPHILS # BLD AUTO: 5.65 K/UL (ref 2–7.15)
NEUTROPHILS NFR BLD: 64 % (ref 44–72)
NRBC # BLD AUTO: 0 K/UL
NRBC BLD-RTO: 0 /100 WBC
PLATELET # BLD AUTO: 238 K/UL (ref 164–446)
PMV BLD AUTO: 10.6 FL (ref 9–12.9)
POTASSIUM SERPL-SCNC: 4.4 MMOL/L (ref 3.6–5.5)
PROT SERPL-MCNC: 7 G/DL (ref 6–8.2)
RBC # BLD AUTO: 5.03 M/UL (ref 4.2–5.4)
SODIUM SERPL-SCNC: 136 MMOL/L (ref 135–145)
T4 FREE SERPL-MCNC: 1.05 NG/DL (ref 0.53–1.43)
TSH SERPL DL<=0.005 MIU/L-ACNC: 2.42 UIU/ML (ref 0.38–5.33)
VIT B12 SERPL-MCNC: 424 PG/ML (ref 211–911)
WBC # BLD AUTO: 8.8 K/UL (ref 4.8–10.8)

## 2019-02-01 PROCEDURE — 84439 ASSAY OF FREE THYROXINE: CPT

## 2019-02-01 PROCEDURE — 80053 COMPREHEN METABOLIC PANEL: CPT

## 2019-02-01 PROCEDURE — 85652 RBC SED RATE AUTOMATED: CPT

## 2019-02-01 PROCEDURE — 82306 VITAMIN D 25 HYDROXY: CPT

## 2019-02-01 PROCEDURE — 85025 COMPLETE CBC W/AUTO DIFF WBC: CPT

## 2019-02-01 PROCEDURE — 84443 ASSAY THYROID STIM HORMONE: CPT

## 2019-02-01 PROCEDURE — 82607 VITAMIN B-12: CPT

## 2019-02-04 ENCOUNTER — TELEPHONE (OUTPATIENT)
Dept: INTERNAL MEDICINE | Facility: IMAGING CENTER | Age: 54
End: 2019-02-04

## 2019-02-04 DIAGNOSIS — R20.2 TINGLING OF RIGHT ARM AND RIGHT SIDE OF FACE: ICD-10-CM

## 2019-02-04 NOTE — TELEPHONE ENCOUNTER
Discussed lab results.  Continues with tingling on right side of face and right UE.   No weakness.     Recommend MRI brain.

## 2019-02-18 ENCOUNTER — HOSPITAL ENCOUNTER (OUTPATIENT)
Dept: RADIOLOGY | Facility: MEDICAL CENTER | Age: 54
End: 2019-02-18
Attending: FAMILY MEDICINE
Payer: COMMERCIAL

## 2019-02-18 DIAGNOSIS — R20.2 TINGLING OF RIGHT ARM AND RIGHT SIDE OF FACE: ICD-10-CM

## 2019-02-18 PROCEDURE — 700117 HCHG RX CONTRAST REV CODE 255: Performed by: FAMILY MEDICINE

## 2019-02-18 PROCEDURE — 70553 MRI BRAIN STEM W/O & W/DYE: CPT

## 2019-02-18 PROCEDURE — A9585 GADOBUTROL INJECTION: HCPCS | Performed by: FAMILY MEDICINE

## 2019-02-18 RX ORDER — GADOBUTROL 604.72 MG/ML
10 INJECTION INTRAVENOUS ONCE
Status: COMPLETED | OUTPATIENT
Start: 2019-02-18 | End: 2019-02-18

## 2019-02-18 RX ADMIN — GADOBUTROL 10 ML: 604.72 INJECTION INTRAVENOUS at 10:13

## 2019-03-11 RX ORDER — ESCITALOPRAM OXALATE 20 MG/1
TABLET ORAL
Qty: 90 TAB | Refills: 3 | Status: SHIPPED | OUTPATIENT
Start: 2019-03-11 | End: 2020-03-13

## 2019-03-15 ENCOUNTER — PATIENT MESSAGE (OUTPATIENT)
Dept: INTERNAL MEDICINE | Facility: IMAGING CENTER | Age: 54
End: 2019-03-15

## 2019-03-15 DIAGNOSIS — M79.672 LEFT FOOT PAIN: ICD-10-CM

## 2019-03-15 NOTE — TELEPHONE ENCOUNTER
From: Ruby Sue  To: Magda Wang M.D.  Sent: 3/15/2019 10:54 AM PDT  Subject: Non-Urgent Medical Question    Do you have a podiatrist you can recommend? I have been having some problems with my left foot that doesn't seem to be getting any better.

## 2019-04-02 ENCOUNTER — PATIENT MESSAGE (OUTPATIENT)
Dept: INTERNAL MEDICINE | Facility: IMAGING CENTER | Age: 54
End: 2019-04-02

## 2019-04-02 DIAGNOSIS — M54.9 MID BACK PAIN: ICD-10-CM

## 2019-04-02 DIAGNOSIS — M62.838 MUSCLE SPASM: ICD-10-CM

## 2019-04-02 RX ORDER — CYCLOBENZAPRINE HCL 10 MG
10 TABLET ORAL 3 TIMES DAILY PRN
Qty: 30 TAB | Refills: 1 | Status: SHIPPED
Start: 2019-04-02 | End: 2020-01-16

## 2019-04-02 NOTE — TELEPHONE ENCOUNTER
From: Ruby Sue  To: Magda Wang M.D.  Sent: 4/2/2019 8:49 AM PDT  Subject: Non-Urgent Medical Question    I wanted to see if I could get some kind of muscle relaxer. My back is killing me, but no like where I hurt it.

## 2019-05-14 ENCOUNTER — HOSPITAL ENCOUNTER (OUTPATIENT)
Facility: MEDICAL CENTER | Age: 54
End: 2019-05-14
Attending: FAMILY MEDICINE
Payer: COMMERCIAL

## 2019-05-14 ENCOUNTER — OFFICE VISIT (OUTPATIENT)
Dept: INTERNAL MEDICINE | Facility: IMAGING CENTER | Age: 54
End: 2019-05-14
Payer: COMMERCIAL

## 2019-05-14 VITALS
WEIGHT: 214 LBS | TEMPERATURE: 98 F | SYSTOLIC BLOOD PRESSURE: 124 MMHG | DIASTOLIC BLOOD PRESSURE: 81 MMHG | HEIGHT: 66 IN | RESPIRATION RATE: 17 BRPM | HEART RATE: 91 BPM | BODY MASS INDEX: 34.39 KG/M2 | OXYGEN SATURATION: 96 %

## 2019-05-14 DIAGNOSIS — N89.8 VAGINAL ODOR: ICD-10-CM

## 2019-05-14 DIAGNOSIS — J30.2 SEASONAL ALLERGIES: ICD-10-CM

## 2019-05-14 DIAGNOSIS — Z00.00 HEALTH CARE MAINTENANCE: ICD-10-CM

## 2019-05-14 DIAGNOSIS — J45.20 MILD INTERMITTENT ASTHMA WITHOUT COMPLICATION: ICD-10-CM

## 2019-05-14 LAB
APPEARANCE UR: CLEAR
BILIRUB UR STRIP-MCNC: NEGATIVE MG/DL
COLOR UR AUTO: YELLOW
GLUCOSE UR STRIP.AUTO-MCNC: NEGATIVE MG/DL
KETONES UR STRIP.AUTO-MCNC: NEGATIVE MG/DL
LEUKOCYTE ESTERASE UR QL STRIP.AUTO: NORMAL
NITRITE UR QL STRIP.AUTO: NEGATIVE
PH UR STRIP.AUTO: 5 [PH] (ref 5–8)
PROT UR QL STRIP: NEGATIVE MG/DL
RBC UR QL AUTO: NORMAL
SP GR UR STRIP.AUTO: 1.02
UROBILINOGEN UR STRIP-MCNC: 0.2 MG/DL

## 2019-05-14 PROCEDURE — 86765 RUBEOLA ANTIBODY: CPT

## 2019-05-14 PROCEDURE — 87086 URINE CULTURE/COLONY COUNT: CPT

## 2019-05-14 PROCEDURE — 99214 OFFICE O/P EST MOD 30 MIN: CPT | Performed by: FAMILY MEDICINE

## 2019-05-14 PROCEDURE — 81002 URINALYSIS NONAUTO W/O SCOPE: CPT | Performed by: FAMILY MEDICINE

## 2019-05-14 RX ORDER — ALBUTEROL SULFATE 90 UG/1
2 AEROSOL, METERED RESPIRATORY (INHALATION) EVERY 4 HOURS PRN
Qty: 1 INHALER | Refills: 3 | Status: SHIPPED | OUTPATIENT
Start: 2019-05-14 | End: 2020-12-10 | Stop reason: SDUPTHER

## 2019-05-15 DIAGNOSIS — N89.8 VAGINAL ODOR: ICD-10-CM

## 2019-05-15 DIAGNOSIS — Z00.00 HEALTH CARE MAINTENANCE: ICD-10-CM

## 2019-05-15 LAB — MEV IGG SER IA-ACNC: 3.13

## 2019-05-15 NOTE — PROGRESS NOTES
"Chief Complaint   Patient presents with   • Female  Problem     Odor in urine and \"underwear are always wet\". Has had this before and it has gone away before with Abx use.        HPI:  Patient is a 53 y.o. female established patient of Dr. Wang who presents today to discuss recurrent vaginal odor issue present since 2018. She reports smelling ammonia when she urinates and denies associated overt vaginal discharge/itching nor acute systemic illness. She feels that her underwear is wet with clear fluid when this condition is present and denies history of STD/ high risk behaviors. She denies fish odor nor grey/white vaginal discharge during these episodes. She has been treated with Flagyl twice for presumed BV with improvement of symptoms and currently denies dysuria. She also has concerns about measles immunity and would like to have titer drawn today. She also has flair of seasonal allergies and is seeing her allergist next week. She has been using her albuterol inhaler PRN and would like to have a refill sent to her pharmacy. She is otherwise stable at this time and is in good spirits.     Patient Active Problem List    Diagnosis Date Noted   • Seasonal allergies 05/14/2019   • BMI 32.0-32.9,adult 04/05/2018   • Recurrent major depressive disorder, in partial remission (HCC) 12/08/2017   • Elevated cholesterol 04/30/2017   • Mid back pain 04/30/2017   • Mild asthma 04/30/2017   • Hypothyroid 09/20/2009       Past medical, surgical, family, and social history was reviewed in Epic chart by me today.     Medications and allergies reviewed and updated in Epic chart by me today.     ROS:  Pertinent positives listed above in HPI. All other systems have been reviewed and are negative.    PE:   /81 (BP Location: Left arm, Patient Position: Sitting, BP Cuff Size: Adult)   Pulse 91   Temp 36.7 °C (98 °F) (Temporal)   Resp 17   Ht 1.676 m (5' 5.98\")   Wt 97.1 kg (214 lb)   SpO2 96%   BMI 34.56 kg/m²   Vital " signs reviewed with patient.     Gen: Well developed; well nourished; no acute distress; age appropriate appearance   Extremities: No peripheral edema b/l LE extremities/ no clubbing nor cyanosis noted  Skin: Warm and dry; no rashes noted   Neuro: No focal deficits noted   Psych: AAOx4; mood and affect are appropriate  Pt declines vaginal exam today.     A/P:  1. Health care maintenance  Pt is requesting measles titer to be drawn today to confirm immunity.   - RUBEOLA IGG AB; Future    2. Vaginal odor  POC UA positive for trace blood and small LE. Will send urine for culture and follow up accordingly. BV remains an ongoing consideration.   - URINE CULTURE(NEW); Future  - POCT Urinalysis    3. Mild intermittent asthma without complication  Pt is using albuterol inhaler PRN during current allergy flair. Pt is to continue PRN inhaler use and new RX sent to pharmacy.   - albuterol (VENTOLIN HFA) 108 (90 Base) MCG/ACT Aero Soln inhalation aerosol; Inhale 2 Puffs by mouth every four hours as needed for Shortness of Breath.  Dispense: 1 Inhaler; Refill: 3    4. Seasonal allergies  Ongoing issue for patient not controlled with multiple OTC medication use. She has appointment next week with her allergist.       I will be in touch with lab results when available, and she is to continue follow up with Dr. Wang as needed.

## 2019-05-17 LAB
BACTERIA UR CULT: NORMAL
SIGNIFICANT IND 70042: NORMAL
SITE SITE: NORMAL
SOURCE SOURCE: NORMAL

## 2019-05-17 RX ORDER — METRONIDAZOLE 500 MG/1
500 TABLET ORAL 2 TIMES DAILY
Qty: 28 TAB | Refills: 0 | Status: SHIPPED | OUTPATIENT
Start: 2019-05-17 | End: 2019-05-31

## 2019-12-09 RX ORDER — LEVOTHYROXINE SODIUM 137 UG/1
TABLET ORAL
Qty: 30 TAB | Refills: 2 | Status: SHIPPED | OUTPATIENT
Start: 2019-12-09 | End: 2020-03-09

## 2020-01-13 ENCOUNTER — HOSPITAL ENCOUNTER (OUTPATIENT)
Facility: MEDICAL CENTER | Age: 55
End: 2020-01-13
Attending: FAMILY MEDICINE
Payer: COMMERCIAL

## 2020-01-13 ENCOUNTER — NON-PROVIDER VISIT (OUTPATIENT)
Dept: INTERNAL MEDICINE | Facility: IMAGING CENTER | Age: 55
End: 2020-01-13
Payer: COMMERCIAL

## 2020-01-13 DIAGNOSIS — E03.9 HYPOTHYROIDISM, UNSPECIFIED TYPE: ICD-10-CM

## 2020-01-13 DIAGNOSIS — E55.9 VITAMIN D DEFICIENCY: ICD-10-CM

## 2020-01-13 DIAGNOSIS — Z00.00 PREVENTATIVE HEALTH CARE: ICD-10-CM

## 2020-01-13 LAB
25(OH)D3 SERPL-MCNC: 12 NG/ML (ref 30–100)
ALBUMIN SERPL BCP-MCNC: 4.2 G/DL (ref 3.2–4.9)
ALBUMIN/GLOB SERPL: 1.5 G/DL
ALP SERPL-CCNC: 60 U/L (ref 30–99)
ALT SERPL-CCNC: 25 U/L (ref 2–50)
ANION GAP SERPL CALC-SCNC: 9 MMOL/L (ref 0–11.9)
AST SERPL-CCNC: 21 U/L (ref 12–45)
BASOPHILS # BLD AUTO: 0.7 % (ref 0–1.8)
BASOPHILS # BLD: 0.04 K/UL (ref 0–0.12)
BILIRUB SERPL-MCNC: 1.1 MG/DL (ref 0.1–1.5)
BUN SERPL-MCNC: 9 MG/DL (ref 8–22)
CALCIUM SERPL-MCNC: 9.3 MG/DL (ref 8.5–10.5)
CHLORIDE SERPL-SCNC: 106 MMOL/L (ref 96–112)
CHOLEST SERPL-MCNC: 200 MG/DL (ref 100–199)
CO2 SERPL-SCNC: 24 MMOL/L (ref 20–33)
CREAT SERPL-MCNC: 0.81 MG/DL (ref 0.5–1.4)
EOSINOPHIL # BLD AUTO: 0.06 K/UL (ref 0–0.51)
EOSINOPHIL NFR BLD: 1.1 % (ref 0–6.9)
ERYTHROCYTE [DISTWIDTH] IN BLOOD BY AUTOMATED COUNT: 45.9 FL (ref 35.9–50)
GLOBULIN SER CALC-MCNC: 2.8 G/DL (ref 1.9–3.5)
GLUCOSE SERPL-MCNC: 111 MG/DL (ref 65–99)
HCT VFR BLD AUTO: 47.2 % (ref 37–47)
HDLC SERPL-MCNC: 54 MG/DL
HGB BLD-MCNC: 15.6 G/DL (ref 12–16)
IMM GRANULOCYTES # BLD AUTO: 0.02 K/UL (ref 0–0.11)
IMM GRANULOCYTES NFR BLD AUTO: 0.4 % (ref 0–0.9)
LDLC SERPL CALC-MCNC: 118 MG/DL
LYMPHOCYTES # BLD AUTO: 1.61 K/UL (ref 1–4.8)
LYMPHOCYTES NFR BLD: 28.2 % (ref 22–41)
MCH RBC QN AUTO: 31.7 PG (ref 27–33)
MCHC RBC AUTO-ENTMCNC: 33.1 G/DL (ref 33.6–35)
MCV RBC AUTO: 95.9 FL (ref 81.4–97.8)
MONOCYTES # BLD AUTO: 0.44 K/UL (ref 0–0.85)
MONOCYTES NFR BLD AUTO: 7.7 % (ref 0–13.4)
NEUTROPHILS # BLD AUTO: 3.53 K/UL (ref 2–7.15)
NEUTROPHILS NFR BLD: 61.9 % (ref 44–72)
NRBC # BLD AUTO: 0 K/UL
NRBC BLD-RTO: 0 /100 WBC
PLATELET # BLD AUTO: 200 K/UL (ref 164–446)
PMV BLD AUTO: 11.4 FL (ref 9–12.9)
POTASSIUM SERPL-SCNC: 3.9 MMOL/L (ref 3.6–5.5)
PROT SERPL-MCNC: 7 G/DL (ref 6–8.2)
RBC # BLD AUTO: 4.92 M/UL (ref 4.2–5.4)
SODIUM SERPL-SCNC: 139 MMOL/L (ref 135–145)
T4 FREE SERPL-MCNC: 1.41 NG/DL (ref 0.53–1.43)
TRIGL SERPL-MCNC: 138 MG/DL (ref 0–149)
TSH SERPL DL<=0.005 MIU/L-ACNC: 0.19 UIU/ML (ref 0.38–5.33)
WBC # BLD AUTO: 5.7 K/UL (ref 4.8–10.8)

## 2020-01-13 PROCEDURE — 80053 COMPREHEN METABOLIC PANEL: CPT

## 2020-01-13 PROCEDURE — 85025 COMPLETE CBC W/AUTO DIFF WBC: CPT

## 2020-01-13 PROCEDURE — 80061 LIPID PANEL: CPT

## 2020-01-13 PROCEDURE — 82306 VITAMIN D 25 HYDROXY: CPT

## 2020-01-13 PROCEDURE — 84443 ASSAY THYROID STIM HORMONE: CPT

## 2020-01-13 PROCEDURE — 84439 ASSAY OF FREE THYROXINE: CPT

## 2020-01-16 ENCOUNTER — OFFICE VISIT (OUTPATIENT)
Dept: INTERNAL MEDICINE | Facility: IMAGING CENTER | Age: 55
End: 2020-01-16
Payer: COMMERCIAL

## 2020-01-16 VITALS
WEIGHT: 225 LBS | HEIGHT: 66 IN | RESPIRATION RATE: 14 BRPM | SYSTOLIC BLOOD PRESSURE: 122 MMHG | OXYGEN SATURATION: 96 % | HEART RATE: 80 BPM | TEMPERATURE: 98 F | DIASTOLIC BLOOD PRESSURE: 70 MMHG | BODY MASS INDEX: 36.16 KG/M2

## 2020-01-16 DIAGNOSIS — E55.9 VITAMIN D DEFICIENCY: ICD-10-CM

## 2020-01-16 DIAGNOSIS — B96.89 BACTERIAL VAGINOSIS: ICD-10-CM

## 2020-01-16 DIAGNOSIS — Z12.39 SCREENING BREAST EXAMINATION: ICD-10-CM

## 2020-01-16 DIAGNOSIS — H91.93 DECREASED HEARING OF BOTH EARS: ICD-10-CM

## 2020-01-16 DIAGNOSIS — M25.561 ACUTE PAIN OF RIGHT KNEE: ICD-10-CM

## 2020-01-16 DIAGNOSIS — Z00.00 ANNUAL PHYSICAL EXAM: ICD-10-CM

## 2020-01-16 DIAGNOSIS — Z23 NEED FOR VACCINATION: ICD-10-CM

## 2020-01-16 DIAGNOSIS — N76.0 BACTERIAL VAGINOSIS: ICD-10-CM

## 2020-01-16 DIAGNOSIS — R73.09 ELEVATED GLUCOSE: ICD-10-CM

## 2020-01-16 DIAGNOSIS — E78.00 ELEVATED CHOLESTEROL: ICD-10-CM

## 2020-01-16 DIAGNOSIS — M79.671 RIGHT FOOT PAIN: ICD-10-CM

## 2020-01-16 DIAGNOSIS — E03.9 HYPOTHYROIDISM, UNSPECIFIED TYPE: ICD-10-CM

## 2020-01-16 DIAGNOSIS — Z91.89 OTHER SPECIFIED PERSONAL RISK FACTORS, NOT ELSEWHERE CLASSIFIED: ICD-10-CM

## 2020-01-16 DIAGNOSIS — F33.42 RECURRENT MAJOR DEPRESSIVE DISORDER, IN FULL REMISSION (HCC): ICD-10-CM

## 2020-01-16 PROCEDURE — 99396 PREV VISIT EST AGE 40-64: CPT | Mod: 25 | Performed by: FAMILY MEDICINE

## 2020-01-16 PROCEDURE — 90471 IMMUNIZATION ADMIN: CPT | Performed by: FAMILY MEDICINE

## 2020-01-16 PROCEDURE — 90750 HZV VACC RECOMBINANT IM: CPT | Performed by: FAMILY MEDICINE

## 2020-01-16 RX ORDER — METRONIDAZOLE 500 MG/1
500 TABLET ORAL 2 TIMES DAILY
Qty: 14 TAB | Refills: 0 | Status: SHIPPED
Start: 2020-01-16 | End: 2020-02-20

## 2020-01-16 RX ORDER — MONTELUKAST SODIUM 10 MG/1
TABLET ORAL
COMMUNITY
Start: 2019-12-05 | End: 2020-07-13 | Stop reason: SDUPTHER

## 2020-01-16 RX ORDER — ERGOCALCIFEROL 1.25 MG/1
50000 CAPSULE ORAL
Qty: 4 CAP | Refills: 3 | Status: SHIPPED | OUTPATIENT
Start: 2020-01-16 | End: 2020-04-09 | Stop reason: SDUPTHER

## 2020-01-16 ASSESSMENT — PATIENT HEALTH QUESTIONNAIRE - PHQ9
2. FEELING DOWN, DEPRESSED, IRRITABLE, OR HOPELESS: NOT AT ALL
SUM OF ALL RESPONSES TO PHQ QUESTIONS 1-9: 0
9. THOUGHTS THAT YOU WOULD BE BETTER OFF DEAD, OR OF HURTING YOURSELF: NOT AT ALL
6. FEELING BAD ABOUT YOURSELF - OR THAT YOU ARE A FAILURE OR HAVE LET YOURSELF OR YOUR FAMILY DOWN: NOT AL ALL
8. MOVING OR SPEAKING SO SLOWLY THAT OTHER PEOPLE COULD HAVE NOTICED. OR THE OPPOSITE, BEING SO FIGETY OR RESTLESS THAT YOU HAVE BEEN MOVING AROUND A LOT MORE THAN USUAL: NOT AT ALL
4. FEELING TIRED OR HAVING LITTLE ENERGY: NOT AT ALL
3. TROUBLE FALLING OR STAYING ASLEEP OR SLEEPING TOO MUCH: NOT AT ALL
5. POOR APPETITE OR OVEREATING: NOT AT ALL
7. TROUBLE CONCENTRATING ON THINGS, SUCH AS READING THE NEWSPAPER OR WATCHING TELEVISION: NOT AT ALL
SUM OF ALL RESPONSES TO PHQ9 QUESTIONS 1 AND 2: 0
1. LITTLE INTEREST OR PLEASURE IN DOING THINGS: NOT AT ALL

## 2020-01-16 NOTE — PROGRESS NOTES
CC:  Annual exam.    HPI:  Ruby is a 54 y.o. established female patient here for annual exam.  She is up-to-date on her Pap smear and will be due next year.  Pap smears have been normal.  She is in a monogamous relationship.  She is no longer having menses.  Her last one was at 10 months ago.  She then had a small amount of spotting in October.  She has having some hot flashes/night sweats.  She is using Estroven and that seems to be helping.    She is due for mammogram.  Up-to-date on her colonoscopy.    She does have hypothyroidism.  Her thyroid is slightly over replaced.  She denies any palpitations.    She continues to struggle with her weight and has gained weight this past year.  They have moved and it has been stressful.  She knows what she needs to do.  She is also been drinking a little bit more wine, 2 to 3 glasses at night and that usually adds to her weight.  And she has not been exercising as regularly.    She is concerned about her risk for cardiac disease.  Her mom had her first heart attack in her 60s and her grandmother at a fairly young age also.  Her cholesterol has been borderline with an LDL in the 120s.  Her glucose is now elevated at 110.    She is also complaining of some right lateral foot pain as well as some intermittent right knee pain.  The knee pain is worse when she goes up and down stairs.  She can feel popping or clicking and it sometimes hurts.  No laxity.  No giving out.  No trauma to her foot.  No inciting event.    Past Medical History:   Diagnosis Date   • ASTHMA    • Backpain    • Depression    • Hypothyroid 9/20/2009   • Hypothyroid    • Psychiatric problem    • Thoracic vertebral fracture (HCC) 11/11    trauma   • Thyroid disease hyper s/p radioactive       Past Surgical History:   Procedure Laterality Date   • OTHER NEUROLOGICAL SURG     • PB REDUCTION OF LARGE BREAST     • TONSILLECTOMY     • TUBAL COAGULATION LAPAROSCOPIC BILATERAL         Family History   Problem  Relation Age of Onset   • Diabetes Mother    • Heart Disease Mother         MI in 60's   • Stroke Father         TIA   • Cancer Father         skin   • Heart Disease Maternal Grandmother    • Stroke Maternal Grandmother        Social History     Tobacco Use   • Smoking status: Never Smoker   • Smokeless tobacco: Never Used   Substance Use Topics   • Alcohol use: Yes     Alcohol/week: 1.8 oz     Types: 3 Glasses of wine per week   • Drug use: No     Counseling given: Not Answered     Patient Active Problem List    Diagnosis Date Noted   • Annual physical exam 01/16/2020   • Seasonal allergies 05/14/2019   • BMI 36.0-36.9,adult 04/05/2018   • Recurrent major depressive disorder, in full remission (HCC) 12/08/2017   • Elevated cholesterol 04/30/2017   • Mid back pain 04/30/2017   • Mild asthma 04/30/2017   • Hypothyroid 09/20/2009     Current Outpatient Medications   Medication Sig Dispense Refill   • montelukast (SINGULAIR) 10 MG Tab      • ergocalciferol (DRISDOL) 63249 UNIT capsule Take 1 Cap by mouth every 7 days. 4 Cap 3   • metroNIDAZOLE (FLAGYL) 500 MG Tab Take 1 Tab by mouth 2 Times a Day. 14 Tab 0   • levothyroxine (SYNTHROID) 137 MCG Tab TAKE ONE TABLET BY MOUTH EVERY MORNING ON AN EMPTY STOMACH 30 Tab 2   • albuterol (VENTOLIN HFA) 108 (90 Base) MCG/ACT Aero Soln inhalation aerosol Inhale 2 Puffs by mouth every four hours as needed for Shortness of Breath. 1 Inhaler 3   • escitalopram (LEXAPRO) 20 MG tablet TAKE ONE TABLET BY MOUTH DAILY 90 Tab 3     No current facility-administered medications for this visit.         REVIEW OF SYSTEMS:  GENERAL: No fatigue, 10 pound weight gain this past year.  HEENT:  Ears--no earache, no dizziness, no tinnitus.  She is complaining of decreased hearing over the past couple of years.                 Eyes--no blurred vision, no discharge or pain                 Throat--No sore throat, no dysphagia, no hoarseness  CV:  No chest pain,dyspnea,palpitations or edema.  RESP:  No  "sob,cough,wheezing or hemoptysis.  GI: No dysphagia, heartburn,abdominal pain, nausea, vomiting, diarrhea or constipation.       No melena, jaundice, bleeding, incontinence or change in bowel habits.  :  No dysuria, polyuria, hematuria, incontinence, or nocturia.  MS: Foot knee pain as above.  NEURO:  No seizures, syncope, paralysis, tremor, or weakness.  SKIN: No new or concerning skin lesions or changes.   PSYCH: Mood fine.--- She is stable on Lexapro 20 mg.  She feels like she is in a good place mentally.  Denies depression, no thoughts of hopelessness or suicide.          /70   Pulse 80   Temp 36.7 °C (98 °F) (Temporal)   Resp 14   Ht 1.676 m (5' 5.98\")   Wt 102.1 kg (225 lb)   SpO2 96%   BMI 36.33 kg/m²  Body mass index is 36.33 kg/m².    PHYSICAL EXAM;  GENERAL; overweight female., No acute distress.   HEENT:  Head normocephalic and atraumatic.    PERRLA, EOMI. No conjunctival injection, no icterus.     TM's normal, nasal mucosa and mouth with no abnormalities.    Oropharynx is clear with no lesions.  NECK: Supple, no adenopathy or thyromegaly.  CV: RR. Normal S1,S2. No murmur, gallop or rub.          No JVD, Carotid pulses 2+ and sym. No bruits.  LUNGS:  Clear, no wheezes, rales or rhonchi.  BREASTS: Previous breast reduction.  No masses or tenderness. No nipple discharge.  AXILLA:  No masses or tenderness.  ABDOMEN: Soft, NT, nondistended. Bowel sounds normal. No hepatosplenomegaly or masses. No rebound or guarding. No hernias.  EXTREMITIES:  No edema.   Right foot with mild tenderness over the distal fifth metatarsal.  There is no swelling, no erythema.  Neurovascular exam is intact.  Right knee with full range of motion.  She does have some crepitus.  There is no tenderness to palpation.  Ligaments are intact with no laxity.  NEURO:  CN II-XII intact. Motor and sensation grossly intact.   SKIN: No rashes or abnormal lesions.  Psych: Mood and affect are appropriate.      Lab Results "   Component Value Date/Time    CHOLSTRLTOT 200 (H) 01/13/2020 08:05 AM     (H) 01/13/2020 08:05 AM    HDL 54 01/13/2020 08:05 AM    TRIGLYCERIDE 138 01/13/2020 08:05 AM       Lab Results   Component Value Date/Time    SODIUM 139 01/13/2020 08:05 AM    POTASSIUM 3.9 01/13/2020 08:05 AM    CHLORIDE 106 01/13/2020 08:05 AM    CO2 24 01/13/2020 08:05 AM    GLUCOSE 111 (H) 01/13/2020 08:05 AM    BUN 9 01/13/2020 08:05 AM    CREATININE 0.81 01/13/2020 08:05 AM     Lab Results   Component Value Date/Time    ALKPHOSPHAT 60 01/13/2020 08:05 AM    ASTSGOT 21 01/13/2020 08:05 AM    ALTSGPT 25 01/13/2020 08:05 AM    TBILIRUBIN 1.1 01/13/2020 08:05 AM        Lab Results   Component Value Date/Time    WBC 5.7 01/13/2020 08:05 AM    RBC 4.92 01/13/2020 08:05 AM    HEMOGLOBIN 15.6 01/13/2020 08:05 AM    HEMATOCRIT 47.2 (H) 01/13/2020 08:05 AM    MCV 95.9 01/13/2020 08:05 AM    MCH 31.7 01/13/2020 08:05 AM    MCHC 33.1 (L) 01/13/2020 08:05 AM    MPV 11.4 01/13/2020 08:05 AM    NEUTSPOLYS 61.90 01/13/2020 08:05 AM    LYMPHOCYTES 28.20 01/13/2020 08:05 AM    MONOCYTES 7.70 01/13/2020 08:05 AM    EOSINOPHILS 1.10 01/13/2020 08:05 AM    BASOPHILS 0.70 01/13/2020 08:05 AM              Assessment and Plan. The following treatment and monitoring plan is recommended:   1. Annual physical exam  Pap smear next year.  Recommend monthly self breast exams and annual exam.    2. Screening breast examination    - MA-SCREENING MAMMO BILAT W/TOMOSYNTHESIS W/CAD; Future    3. Other specified personal risk factors, not elsewhere classified  Did discuss her personal risks for heart disease.  I encouraged her to focus on a healthy diet, primarily plant-based or Mediterranean.  Also get regular exercise.  Stressed the importance of weight loss.  With her family history recommend CT cardiac score.  Then possibly consider statin.  - CT-CARDIAC SCORING; Future    4. Need for vaccination    - Shingles Vaccine (Shingrix)    5. Vitamin D  deficiency  Vitamin D 50,000 international units weekly for 3 months and then recheck.  - ergocalciferol (DRISDOL) 87529 UNIT capsule; Take 1 Cap by mouth every 7 days.  Dispense: 4 Cap; Refill: 3    6. Bacterial vaginosis  She does describe symptoms of bacterial vaginosis which she has had intermittently.  She has taken metronidazole in the past with good results.  Will treat again.  She is aware that she cannot drink alcohol with this medication.  - metroNIDAZOLE (FLAGYL) 500 MG Tab; Take 1 Tab by mouth 2 Times a Day.  Dispense: 14 Tab; Refill: 0    7. Elevated glucose  Counseled regarding healthy diet, exercise, limiting carbs, portion size.  Recheck glucose and A1c in 3 months    8. BMI 36.0-36.9,adult    - Patient identified as having weight management issue.  Appropriate orders and counseling given.    9. Recurrent major depressive disorder, in full remission (HCC)  Stable on Lexapro    10. Hypothyroidism, unspecified type  She will continue levothyroxine 137 mcg daily.  However she is to take only one half of a tablet on Sunday.  Recheck in 3 months    11. Elevated cholesterol  Continue efforts at diet and exercise.    12. Right foot pain  Ibuprofen 600 mg twice daily for 2 to 4 weeks.  If not seeing improvement, she is to call for x-ray.    13. Acute pain of right knee  Again trial of ibuprofen twice daily for 2 to 4 weeks.  Ice.  Again encouraged weight loss.    14.   Decreased hearing.  Referral to audiology.  15.   Healthcare Maintenance. Counseled re: nutrition, activity and safety. Reviewed immunizations.     Follow-up here in 3 months  ·

## 2020-01-23 ENCOUNTER — HOSPITAL ENCOUNTER (OUTPATIENT)
Dept: RADIOLOGY | Facility: MEDICAL CENTER | Age: 55
End: 2020-01-23
Attending: FAMILY MEDICINE
Payer: COMMERCIAL

## 2020-01-23 DIAGNOSIS — Z12.39 SCREENING BREAST EXAMINATION: ICD-10-CM

## 2020-01-23 PROCEDURE — 77067 SCR MAMMO BI INCL CAD: CPT

## 2020-01-24 ENCOUNTER — HOSPITAL ENCOUNTER (OUTPATIENT)
Dept: RADIOLOGY | Facility: MEDICAL CENTER | Age: 55
End: 2020-01-24
Attending: FAMILY MEDICINE
Payer: COMMERCIAL

## 2020-01-24 DIAGNOSIS — Z91.89 OTHER SPECIFIED PERSONAL RISK FACTORS, NOT ELSEWHERE CLASSIFIED: ICD-10-CM

## 2020-01-24 PROCEDURE — 4410556 CT-CARDIAC SCORING

## 2020-02-20 ENCOUNTER — OFFICE VISIT (OUTPATIENT)
Dept: INTERNAL MEDICINE | Facility: IMAGING CENTER | Age: 55
End: 2020-02-20
Payer: COMMERCIAL

## 2020-02-20 VITALS
SYSTOLIC BLOOD PRESSURE: 106 MMHG | RESPIRATION RATE: 17 BRPM | BODY MASS INDEX: 36.17 KG/M2 | WEIGHT: 225.09 LBS | TEMPERATURE: 97.9 F | HEART RATE: 78 BPM | OXYGEN SATURATION: 98 % | DIASTOLIC BLOOD PRESSURE: 65 MMHG | HEIGHT: 66 IN

## 2020-02-20 DIAGNOSIS — R07.81 RIB PAIN ON LEFT SIDE: ICD-10-CM

## 2020-02-20 PROCEDURE — 99214 OFFICE O/P EST MOD 30 MIN: CPT | Performed by: FAMILY MEDICINE

## 2020-02-20 RX ORDER — CYCLOBENZAPRINE HCL 10 MG
10 TABLET ORAL 3 TIMES DAILY PRN
Qty: 30 TAB | Refills: 0 | Status: SHIPPED | OUTPATIENT
Start: 2020-02-20 | End: 2021-02-08

## 2020-02-20 RX ORDER — HYDROCODONE BITARTRATE AND ACETAMINOPHEN 10; 325 MG/1; MG/1
1 TABLET ORAL EVERY 6 HOURS PRN
Qty: 24 TAB | Refills: 0 | Status: SHIPPED | OUTPATIENT
Start: 2020-02-20 | End: 2020-02-27

## 2020-02-20 RX ORDER — PHENTERMINE HYDROCHLORIDE 37.5 MG/1
37.5 TABLET ORAL
Qty: 30 TAB | Refills: 0 | Status: SHIPPED
Start: 2020-02-20 | End: 2020-03-21

## 2020-02-21 NOTE — PROGRESS NOTES
Chief Complaint   Patient presents with   • Muscle Pain     Left side, worse now than before.        HISTORY OF PRESENT ILLNESS: Patient is a 54 y.o. female established patient who presents today complaining of left rib pain.  She had a respiratory illness and was doing quite a bit of coughing for about the past 2 weeks.  Initially she coughed and felt pain in her left ribs in the midaxillary line.  Then her right ribs started bothering her as well.  Her right ribs have improved.  But she continues with pain on her left lower ribs in the mid axillary line and radiating anteriorly.  She has had no rashes.  Pain is worse if she takes a big deep breath or if she twists certain ways.  Her cough is resolved.  She has been using ibuprofen.  It does help during the day but she is struggling at night to get rest.  If she rolls over in bed she has pain.    She also is concerned about her weight.  Her BMI now is 36.  She is trying to eat healthy.  Has not been exercising over the past month due to her cough.  She has used phentermine in the past with no side effects.  She would like to try that again along with her diet and exercise.      Patient Active Problem List    Diagnosis Date Noted   • Annual physical exam 01/16/2020   • Seasonal allergies 05/14/2019   • BMI 36.0-36.9,adult 04/05/2018   • Recurrent major depressive disorder, in full remission (HCC) 12/08/2017   • Elevated cholesterol 04/30/2017   • Mid back pain 04/30/2017   • Mild asthma 04/30/2017   • Hypothyroid 09/20/2009     Current Outpatient Medications on File Prior to Visit   Medication Sig Dispense Refill   • montelukast (SINGULAIR) 10 MG Tab      • ergocalciferol (DRISDOL) 79096 UNIT capsule Take 1 Cap by mouth every 7 days. 4 Cap 3   • levothyroxine (SYNTHROID) 137 MCG Tab TAKE ONE TABLET BY MOUTH EVERY MORNING ON AN EMPTY STOMACH 30 Tab 2   • albuterol (VENTOLIN HFA) 108 (90 Base) MCG/ACT Aero Soln inhalation aerosol Inhale 2 Puffs by mouth every four  "hours as needed for Shortness of Breath. 1 Inhaler 3   • escitalopram (LEXAPRO) 20 MG tablet TAKE ONE TABLET BY MOUTH DAILY 90 Tab 3     No current facility-administered medications on file prior to visit.          Past medical, surgical, family, and social history is reviewed and updated in Epic chart by me today.   Medications and allergies reviewed and updated in Epic chart by me today.     REVIEW OF SYSTEMS:  GENERAL: No fatigue.  She has gained about 20 pounds over the past 2 years  HEENT:  Ears--no earache, no change in hearing, no dizziness, no tinnitus.                 Eyes--no blurred vision, no discharge or pain                 Throat--No sore throat, no dysphagia, no hoarseness  CV:  No chest pain,dyspnea,palpitations or edema.  Left rib pain.  RESP:  No sob,cough,wheezing or hemoptysis.  Left rib pain.  Pain with deep inspiration.  GI: No dysphagia, heartburn,abdominal pain, nausea, vomiting, diarrhea or constipation.       No melena, jaundice, bleeding, incontinence or change in bowel habits.  :  No dysuria, polyuria, hematuria, incontinence, or nocturia.  MS:  No joint swelling, myalgias, or arthralgias.  NEURO:  No seizures, syncope, paralysis, tremor, or weakness.  SKIN: No new or concerning skin lesions or changes.   PSYCH: Mood fine.    Vitals:    02/20/20 1553   BP: 106/65   BP Location: Left arm   Patient Position: Sitting   BP Cuff Size: Adult   Pulse: 78   Resp: 17   Temp: 36.6 °C (97.9 °F)   TempSrc: Temporal   SpO2: 98%   Weight: 102.1 kg (225 lb 1.4 oz)   Height: 1.676 m (5' 6\")     Physical Exam:  Gen: Obese female.. No acute distress.  Neck:  Supple, no adenopathy or thyromegaly.  Heart:  Regular rate and rhythm.  Normal S1, S2. No murmur, gallop or rub.  Lungs:  Clear, No wheezes,rales or rhonchi.  Chest wall: She is tender along the lower ribs in the mid axillary line and anteriorly.  There are no rashes.  There is no crepitus.  There is no bruising.  Extremities:  No edema.  Psych: " Mood and affect are appropriate.      Assessment/Plan:  1. Rib pain on left side.  She has been taking ibuprofen and cyclobenzaprine.  I refilled her Flexeril.  She is given a prescription of Norco #. 24 to take if needed for increased pain.  Opioid risk score is 1.  He consent is reviewed with her, risks are reviewed with her.  She is aware this is narcotic.  She will take it only as prescribed.  She is to call for worsening of her pain or if not improved over the next 2 weeks.  Then recommend a chest/rib x-ray. HYDROcodone/acetaminophen (NORCO)  MG Tab    Controlled Substance Treatment Agreement   2. BMI 36.0-36.9,adult.  Counseled regarding diet, exercise.  She may start phentermine 37.5 mg.  She is to avoid all other stimulants and follow-up here in 1 month. phentermine (ADIPEX-P) 37.5 MG tablet

## 2020-03-09 RX ORDER — LEVOTHYROXINE SODIUM 137 UG/1
TABLET ORAL
Qty: 30 TAB | Refills: 1 | Status: SHIPPED | OUTPATIENT
Start: 2020-03-09 | End: 2020-05-11

## 2020-03-13 RX ORDER — ESCITALOPRAM OXALATE 20 MG/1
TABLET ORAL
Qty: 90 TAB | Refills: 3 | Status: SHIPPED | OUTPATIENT
Start: 2020-03-13 | End: 2020-12-10 | Stop reason: SDUPTHER

## 2020-04-09 DIAGNOSIS — E55.9 VITAMIN D DEFICIENCY: ICD-10-CM

## 2020-04-09 RX ORDER — ERGOCALCIFEROL 1.25 MG/1
50000 CAPSULE ORAL
Qty: 4 CAP | Refills: 3 | Status: SHIPPED | OUTPATIENT
Start: 2020-04-09 | End: 2020-08-27

## 2020-05-11 RX ORDER — LEVOTHYROXINE SODIUM 137 UG/1
TABLET ORAL
Qty: 30 TAB | Refills: 2 | Status: SHIPPED | OUTPATIENT
Start: 2020-05-11 | End: 2020-08-07

## 2020-05-12 ENCOUNTER — NON-PROVIDER VISIT (OUTPATIENT)
Dept: INTERNAL MEDICINE | Facility: IMAGING CENTER | Age: 55
End: 2020-05-12
Payer: COMMERCIAL

## 2020-05-12 ENCOUNTER — HOSPITAL ENCOUNTER (OUTPATIENT)
Facility: MEDICAL CENTER | Age: 55
End: 2020-05-12
Attending: FAMILY MEDICINE
Payer: COMMERCIAL

## 2020-05-12 DIAGNOSIS — E55.9 VITAMIN D DEFICIENCY: ICD-10-CM

## 2020-05-12 DIAGNOSIS — E03.9 HYPOTHYROIDISM, UNSPECIFIED TYPE: ICD-10-CM

## 2020-05-12 DIAGNOSIS — R73.09 ELEVATED GLUCOSE: ICD-10-CM

## 2020-05-12 DIAGNOSIS — E78.00 ELEVATED CHOLESTEROL: ICD-10-CM

## 2020-05-12 LAB
25(OH)D3 SERPL-MCNC: 39 NG/ML (ref 30–100)
CHOLEST SERPL-MCNC: 186 MG/DL (ref 100–199)
EST. AVERAGE GLUCOSE BLD GHB EST-MCNC: 137 MG/DL
GLUCOSE SERPL-MCNC: 100 MG/DL (ref 65–99)
HBA1C MFR BLD: 6.4 % (ref 0–5.6)
HDLC SERPL-MCNC: 49 MG/DL
LDLC SERPL CALC-MCNC: 114 MG/DL
T4 FREE SERPL-MCNC: 1.65 NG/DL (ref 0.93–1.7)
TRIGL SERPL-MCNC: 117 MG/DL (ref 0–149)
TSH SERPL DL<=0.005 MIU/L-ACNC: 0.32 UIU/ML (ref 0.38–5.33)

## 2020-05-12 PROCEDURE — 84439 ASSAY OF FREE THYROXINE: CPT

## 2020-05-12 PROCEDURE — 84443 ASSAY THYROID STIM HORMONE: CPT

## 2020-05-12 PROCEDURE — 83036 HEMOGLOBIN GLYCOSYLATED A1C: CPT

## 2020-05-12 PROCEDURE — 82306 VITAMIN D 25 HYDROXY: CPT

## 2020-05-12 PROCEDURE — 82947 ASSAY GLUCOSE BLOOD QUANT: CPT

## 2020-05-12 PROCEDURE — 80061 LIPID PANEL: CPT

## 2020-05-29 ENCOUNTER — NON-PROVIDER VISIT (OUTPATIENT)
Dept: INTERNAL MEDICINE | Facility: IMAGING CENTER | Age: 55
End: 2020-05-29
Payer: COMMERCIAL

## 2020-05-29 ENCOUNTER — HOSPITAL ENCOUNTER (OUTPATIENT)
Facility: MEDICAL CENTER | Age: 55
End: 2020-05-29
Attending: FAMILY MEDICINE
Payer: COMMERCIAL

## 2020-05-29 DIAGNOSIS — Z23 NEED FOR VACCINATION: ICD-10-CM

## 2020-05-29 DIAGNOSIS — B97.89 VIRAL RESPIRATORY ILLNESS: ICD-10-CM

## 2020-05-29 DIAGNOSIS — J98.8 VIRAL RESPIRATORY ILLNESS: ICD-10-CM

## 2020-05-29 PROCEDURE — 90750 HZV VACC RECOMBINANT IM: CPT | Performed by: FAMILY MEDICINE

## 2020-05-29 PROCEDURE — 86769 SARS-COV-2 COVID-19 ANTIBODY: CPT

## 2020-05-29 PROCEDURE — 90471 IMMUNIZATION ADMIN: CPT | Performed by: FAMILY MEDICINE

## 2020-06-02 LAB — SARS-COV-2 IGG+IGM SERPL QL IA: NORMAL

## 2020-07-13 RX ORDER — MONTELUKAST SODIUM 10 MG/1
10 TABLET ORAL DAILY
Qty: 90 TAB | Refills: 1 | Status: SHIPPED | OUTPATIENT
Start: 2020-07-13 | End: 2020-10-11

## 2020-07-16 ENCOUNTER — OFFICE VISIT (OUTPATIENT)
Dept: INTERNAL MEDICINE | Facility: IMAGING CENTER | Age: 55
End: 2020-07-16
Payer: COMMERCIAL

## 2020-07-16 VITALS
BODY MASS INDEX: 34.07 KG/M2 | HEART RATE: 77 BPM | OXYGEN SATURATION: 98 % | TEMPERATURE: 98.1 F | WEIGHT: 212 LBS | DIASTOLIC BLOOD PRESSURE: 62 MMHG | RESPIRATION RATE: 14 BRPM | SYSTOLIC BLOOD PRESSURE: 110 MMHG | HEIGHT: 66 IN

## 2020-07-16 DIAGNOSIS — E55.9 VITAMIN D DEFICIENCY: ICD-10-CM

## 2020-07-16 DIAGNOSIS — L57.0 AK (ACTINIC KERATOSIS): ICD-10-CM

## 2020-07-16 DIAGNOSIS — R73.09 ELEVATED GLUCOSE: ICD-10-CM

## 2020-07-16 DIAGNOSIS — E03.9 HYPOTHYROIDISM, UNSPECIFIED TYPE: ICD-10-CM

## 2020-07-16 PROCEDURE — 99213 OFFICE O/P EST LOW 20 MIN: CPT | Mod: 25 | Performed by: FAMILY MEDICINE

## 2020-07-16 PROCEDURE — 17000 DESTRUCT PREMALG LESION: CPT | Performed by: FAMILY MEDICINE

## 2020-07-16 PROCEDURE — 17003 DESTRUCT PREMALG LES 2-14: CPT | Performed by: FAMILY MEDICINE

## 2020-07-16 ASSESSMENT — FIBROSIS 4 INDEX: FIB4 SCORE: 1.092

## 2020-07-16 NOTE — PROGRESS NOTES
Chief Complaint   Patient presents with   • Skin Lesion     check skin       HISTORY OF PRESENT ILLNESS: Patient is a 54 y.o. female established patient who presents today complaining of a couple skin lesions.  She has 1 on her left forearm that is a little dry and scaly.  Flesh-colored.  When she has 1 on her right chest that is larger and also red and scaly.  This is been there for at least 6 months.  She does have a family history of melanoma in her sister.    Also was diagnosed with prediabetes with her last lab work.  Her A1c was 6.4.  She has made a good effort to eat healthier and exercise and she has dropped her weight 13 pounds.  She is feeling well.      Patient Active Problem List    Diagnosis Date Noted   • Elevated glucose 07/16/2020   • Annual physical exam 01/16/2020   • Seasonal allergies 05/14/2019   • BMI 34.0-34.9,adult 04/05/2018   • Recurrent major depressive disorder, in full remission (HCC) 12/08/2017   • Elevated cholesterol 04/30/2017   • Mid back pain 04/30/2017   • Mild asthma 04/30/2017   • Hypothyroid 09/20/2009     Current Outpatient Medications on File Prior to Visit   Medication Sig Dispense Refill   • montelukast (SINGULAIR) 10 MG Tab Take 1 Tab by mouth every day for 90 days. 90 Tab 1   • levothyroxine (SYNTHROID) 137 MCG Tab TAKE ONE TABLET BY MOUTH EVERY MORNING ON AN EMPTY STOMACH(SYNTHROID) 30 Tab 2   • ergocalciferol (DRISDOL) 84312 UNIT capsule Take 1 Cap by mouth every 7 days. 4 Cap 3   • escitalopram (LEXAPRO) 20 MG tablet TAKE ONE TABLET BY MOUTH DAILY 90 Tab 3   • cyclobenzaprine (FLEXERIL) 10 MG Tab Take 1 Tab by mouth 3 times a day as needed. 30 Tab 0   • albuterol (VENTOLIN HFA) 108 (90 Base) MCG/ACT Aero Soln inhalation aerosol Inhale 2 Puffs by mouth every four hours as needed for Shortness of Breath. 1 Inhaler 3     No current facility-administered medications on file prior to visit.          Past medical, surgical, family, and social history is reviewed and updated  "in Epic chart by me today.   Medications and allergies reviewed and updated in Epic chart by me today.     REVIEW OF SYSTEMS:  GENERAL: No fatigue, intentional weight loss.  HEENT:  Ears--no earache, no change in hearing, no dizziness, no tinnitus.                 Eyes--no blurred vision, no discharge or pain                 Throat--No sore throat, no dysphagia, no hoarseness  CV:  No chest pain,dyspnea,palpitations or edema.  RESP:  No sob,cough,wheezing or hemoptysis.  GI: No dysphagia, heartburn,abdominal pain, nausea, vomiting, diarrhea or constipation.       No melena, jaundice, bleeding, incontinence or change in bowel habits.  :  No dysuria, polyuria, hematuria, incontinence, or nocturia.  MS:  No joint swelling, myalgias, or arthralgias.  NEURO:  No seizures, syncope, paralysis, tremor, or weakness.  SKIN: As above  PSYCH: Mood fine.    Vitals:    07/16/20 0800   BP: 110/62   Pulse: 77   Resp: 14   Temp: 36.7 °C (98.1 °F)   TempSrc: Temporal   SpO2: 98%   Weight: 96.2 kg (212 lb)   Height: 1.676 m (5' 5.98\")     Physical Exam:  Gen: Overweight female.  He is no acute distress.  Neck:  Supple, no adenopathy or thyromegaly.  Heart:  Regular rate and rhythm.  Normal S1, S2. No murmur, gallop or rub.  Lungs:  Clear, No wheezes,rales or rhonchi.  Extremities:  No edema.  Skin: 2 mm flesh-colored slightly rough elevated lesion on her left forearm consistent with seborrheic keratosis  6 mm erythematous scaling elevated lesion on her right chest, consistent with actinic keratosis.  Psych: Mood and affect are appropriate.        Assessment/Plan:  1. AK (actinic keratosis).  The lesion on her chest today is treated with cryotherapy.  She is instructed in wound care and will call for concerns.    2. Vitamin D deficiency.  Continue supplementation VITAMIN D,25 HYDROXY   3. Elevated glucose.  Education regarding prediabetes.  She is already off to a very good start.  Recheck labs in late August. Blood Glucose    " HEMOGLOBIN A1C   4. Hypothyroidism, unspecified type.  We did adjust her thyroid slightly in May and will recheck this in August also. TSH    FREE THYROXINE   5. BMI 34.0-34.9,adult.  Continue efforts at healthy diet and exercise.      Follow-up in late August

## 2020-08-07 RX ORDER — LEVOTHYROXINE SODIUM 137 UG/1
TABLET ORAL
Qty: 90 TAB | Refills: 1 | Status: SHIPPED
Start: 2020-08-07 | End: 2020-08-21

## 2020-08-20 ENCOUNTER — APPOINTMENT (OUTPATIENT)
Dept: INTERNAL MEDICINE | Facility: IMAGING CENTER | Age: 55
End: 2020-08-20
Payer: COMMERCIAL

## 2020-08-20 ENCOUNTER — HOSPITAL ENCOUNTER (OUTPATIENT)
Facility: MEDICAL CENTER | Age: 55
End: 2020-08-20
Attending: FAMILY MEDICINE
Payer: COMMERCIAL

## 2020-08-20 DIAGNOSIS — E55.9 VITAMIN D DEFICIENCY: ICD-10-CM

## 2020-08-20 DIAGNOSIS — R73.09 ELEVATED GLUCOSE: ICD-10-CM

## 2020-08-20 DIAGNOSIS — E03.9 HYPOTHYROIDISM, UNSPECIFIED TYPE: ICD-10-CM

## 2020-08-20 LAB
25(OH)D3 SERPL-MCNC: 57 NG/ML (ref 30–100)
EST. AVERAGE GLUCOSE BLD GHB EST-MCNC: 108 MG/DL
GLUCOSE SERPL-MCNC: 105 MG/DL (ref 65–99)
HBA1C MFR BLD: 5.4 % (ref 0–5.6)
T4 FREE SERPL-MCNC: 1.79 NG/DL (ref 0.93–1.7)
TSH SERPL DL<=0.005 MIU/L-ACNC: 0.25 UIU/ML (ref 0.38–5.33)

## 2020-08-20 PROCEDURE — 83036 HEMOGLOBIN GLYCOSYLATED A1C: CPT

## 2020-08-20 PROCEDURE — 82306 VITAMIN D 25 HYDROXY: CPT

## 2020-08-20 PROCEDURE — 84443 ASSAY THYROID STIM HORMONE: CPT

## 2020-08-20 PROCEDURE — 84439 ASSAY OF FREE THYROXINE: CPT

## 2020-08-20 PROCEDURE — 82947 ASSAY GLUCOSE BLOOD QUANT: CPT

## 2020-08-21 ENCOUNTER — PATIENT MESSAGE (OUTPATIENT)
Dept: INTERNAL MEDICINE | Facility: IMAGING CENTER | Age: 55
End: 2020-08-21

## 2020-08-21 RX ORDER — LEVOTHYROXINE SODIUM 112 UG/1
112 TABLET ORAL
Qty: 90 TAB | Refills: 1 | Status: SHIPPED | OUTPATIENT
Start: 2020-08-21 | End: 2020-12-10 | Stop reason: SDUPTHER

## 2020-08-27 DIAGNOSIS — E55.9 VITAMIN D DEFICIENCY: ICD-10-CM

## 2020-08-27 RX ORDER — ERGOCALCIFEROL 1.25 MG/1
CAPSULE ORAL
Qty: 4 CAP | Refills: 2 | Status: SHIPPED | OUTPATIENT
Start: 2020-08-27 | End: 2020-11-30

## 2020-09-18 ENCOUNTER — PATIENT MESSAGE (OUTPATIENT)
Dept: INTERNAL MEDICINE | Facility: IMAGING CENTER | Age: 55
End: 2020-09-18

## 2020-09-18 ENCOUNTER — IMMUNIZATION (OUTPATIENT)
Dept: SOCIAL WORK | Facility: CLINIC | Age: 55
End: 2020-09-18
Payer: COMMERCIAL

## 2020-09-18 DIAGNOSIS — F43.9 SITUATIONAL STRESS: ICD-10-CM

## 2020-09-18 DIAGNOSIS — Z23 NEED FOR VACCINATION: ICD-10-CM

## 2020-09-18 DIAGNOSIS — F41.9 ANXIETY: ICD-10-CM

## 2020-09-18 PROCEDURE — 90686 IIV4 VACC NO PRSV 0.5 ML IM: CPT | Performed by: REGISTERED NURSE

## 2020-09-18 PROCEDURE — 90471 IMMUNIZATION ADMIN: CPT | Performed by: REGISTERED NURSE

## 2020-09-18 NOTE — TELEPHONE ENCOUNTER
From: Ruby Sue  To: Magda Wang M.D.  Sent: 9/18/2020 9:07 AM PDT  Subject: Non-Urgent Medical Question    Agustin has decided to quit drinking (again) and I need someone to talk to. Any recommendations? This has been an ongoing problem for a long time. This is not the first time he has quit. Hopefully it will be the last. I am trying to be supportive and encouraging, but am finding it hard not not sure the best way to be supportive. He did go see Nina the other day. My concern is that he is sure he can do it on his own. I hope he can. Today when we were talking about how good he has done (been a week), he could see in my face I guess the doubt.... and he got upset. I need to learn how to deal with this.

## 2020-11-28 DIAGNOSIS — E55.9 VITAMIN D DEFICIENCY: ICD-10-CM

## 2020-11-29 ENCOUNTER — PATIENT MESSAGE (OUTPATIENT)
Dept: INTERNAL MEDICINE | Facility: IMAGING CENTER | Age: 55
End: 2020-11-29

## 2020-11-29 DIAGNOSIS — Z20.828 EXPOSURE TO SARS-ASSOCIATED CORONAVIRUS: ICD-10-CM

## 2020-11-30 RX ORDER — ERGOCALCIFEROL 1.25 MG/1
CAPSULE ORAL
Qty: 12 CAP | Refills: 3 | Status: SHIPPED | OUTPATIENT
Start: 2020-11-30 | End: 2020-12-10 | Stop reason: SDUPTHER

## 2020-11-30 NOTE — TELEPHONE ENCOUNTER
From: Ruby Sue  To: Magda Wang M.D.  Sent: 11/29/2020 11:56 AM PST  Subject: Non-Urgent Medical Question    I need to get a Covid test. My daughter's boyfriend tested positive. He was at my house Friday.

## 2020-12-02 ENCOUNTER — HOSPITAL ENCOUNTER (OUTPATIENT)
Dept: LAB | Facility: MEDICAL CENTER | Age: 55
End: 2020-12-02
Attending: FAMILY MEDICINE
Payer: COMMERCIAL

## 2020-12-02 DIAGNOSIS — Z20.828 EXPOSURE TO SARS-ASSOCIATED CORONAVIRUS: ICD-10-CM

## 2020-12-02 PROCEDURE — U0003 INFECTIOUS AGENT DETECTION BY NUCLEIC ACID (DNA OR RNA); SEVERE ACUTE RESPIRATORY SYNDROME CORONAVIRUS 2 (SARS-COV-2) (CORONAVIRUS DISEASE [COVID-19]), AMPLIFIED PROBE TECHNIQUE, MAKING USE OF HIGH THROUGHPUT TECHNOLOGIES AS DESCRIBED BY CMS-2020-01-R: HCPCS

## 2020-12-03 ENCOUNTER — PATIENT MESSAGE (OUTPATIENT)
Dept: INTERNAL MEDICINE | Facility: IMAGING CENTER | Age: 55
End: 2020-12-03

## 2020-12-03 LAB
COVID ORDER STATUS COVID19: NORMAL
SARS-COV-2 RNA RESP QL NAA+PROBE: NOTDETECTED
SPECIMEN SOURCE: NORMAL

## 2020-12-10 DIAGNOSIS — J30.2 SEASONAL ALLERGIES: ICD-10-CM

## 2020-12-10 DIAGNOSIS — J45.20 MILD INTERMITTENT ASTHMA WITHOUT COMPLICATION: ICD-10-CM

## 2020-12-10 DIAGNOSIS — E55.9 VITAMIN D DEFICIENCY: ICD-10-CM

## 2020-12-10 RX ORDER — MONTELUKAST SODIUM 10 MG/1
10 TABLET ORAL DAILY
Qty: 90 TAB | Refills: 3 | Status: SHIPPED | OUTPATIENT
Start: 2020-12-10 | End: 2021-01-06

## 2020-12-10 RX ORDER — ERGOCALCIFEROL 1.25 MG/1
50000 CAPSULE ORAL
Qty: 12 CAP | Refills: 3 | Status: SHIPPED | OUTPATIENT
Start: 2020-12-10 | End: 2021-11-29 | Stop reason: SDUPTHER

## 2020-12-10 RX ORDER — ALBUTEROL SULFATE 90 UG/1
2 AEROSOL, METERED RESPIRATORY (INHALATION) EVERY 4 HOURS PRN
Qty: 3 EACH | Refills: 3 | Status: SHIPPED | OUTPATIENT
Start: 2020-12-10

## 2020-12-10 RX ORDER — ESCITALOPRAM OXALATE 20 MG/1
TABLET ORAL
Qty: 90 TAB | Refills: 3 | Status: SHIPPED | OUTPATIENT
Start: 2020-12-10 | End: 2021-03-15 | Stop reason: SDUPTHER

## 2020-12-10 RX ORDER — LEVOTHYROXINE SODIUM 112 UG/1
112 TABLET ORAL
Qty: 90 TAB | Refills: 3 | Status: SHIPPED | OUTPATIENT
Start: 2020-12-10 | End: 2021-02-16 | Stop reason: SDUPTHER

## 2020-12-11 ENCOUNTER — OFFICE VISIT (OUTPATIENT)
Dept: INTERNAL MEDICINE | Facility: IMAGING CENTER | Age: 55
End: 2020-12-11
Payer: COMMERCIAL

## 2020-12-11 VITALS
DIASTOLIC BLOOD PRESSURE: 70 MMHG | WEIGHT: 212 LBS | OXYGEN SATURATION: 96 % | HEIGHT: 66 IN | RESPIRATION RATE: 14 BRPM | BODY MASS INDEX: 34.07 KG/M2 | SYSTOLIC BLOOD PRESSURE: 122 MMHG | TEMPERATURE: 97.2 F | HEART RATE: 69 BPM

## 2020-12-11 DIAGNOSIS — L82.1 SK (SEBORRHEIC KERATOSIS): ICD-10-CM

## 2020-12-11 PROCEDURE — 17110 DESTRUCTION B9 LES UP TO 14: CPT | Performed by: FAMILY MEDICINE

## 2020-12-11 ASSESSMENT — FIBROSIS 4 INDEX: FIB4 SCORE: 1.092

## 2020-12-13 NOTE — PROGRESS NOTES
Chief Complaint   Patient presents with   • Skin Lesion     left forearm       HISTORY OF PRESENT ILLNESS: Patient is a 54 y.o. female established patient who presents today complaining of a lesion on her left forearm.  She has a lesion that is near her elbow laterally.  It is rough and sometimes itchy.  She does bump it on things and it hurts.  She would like to have this removed.    Patient Active Problem List    Diagnosis Date Noted   • Elevated glucose 07/16/2020   • Annual physical exam 01/16/2020   • Seasonal allergies 05/14/2019   • BMI 34.0-34.9,adult 04/05/2018   • Recurrent major depressive disorder, in full remission (HCC) 12/08/2017   • Elevated cholesterol 04/30/2017   • Mid back pain 04/30/2017   • Mild asthma 04/30/2017   • Hypothyroid 09/20/2009     Current Outpatient Medications on File Prior to Visit   Medication Sig Dispense Refill   • albuterol (VENTOLIN HFA) 108 (90 Base) MCG/ACT Aero Soln inhalation aerosol Inhale 2 Puffs every four hours as needed for Shortness of Breath. 3 Each 3   • escitalopram (LEXAPRO) 20 MG tablet TAKE ONE TABLET BY MOUTH DAILY 90 Tab 3   • levothyroxine (SYNTHROID) 112 MCG Tab Take 1 Tab by mouth Every morning on an empty stomach. 90 Tab 3   • vitamin D, Ergocalciferol, (DRISDOL) 1.25 MG (41273 UT) Cap capsule Take 1 Cap by mouth every 7 days. 12 Cap 3   • montelukast (SINGULAIR) 10 MG Tab Take 1 Tab by mouth every day. 90 Tab 3   • cyclobenzaprine (FLEXERIL) 10 MG Tab Take 1 Tab by mouth 3 times a day as needed. 30 Tab 0     No current facility-administered medications on file prior to visit.          Past medical, surgical, family, and social history is reviewed and updated in Epic chart by me today.   Medications and allergies reviewed and updated in Epic chart by me today.     REVIEW OF SYSTEMS:  GENERAL: No fatigue, no weight loss.  Skin: As above..    Vitals:    12/11/20 0900   BP: 122/70   Pulse: 69   Resp: 14   Temp: 36.2 °C (97.2 °F)   TempSrc: Temporal   SpO2:  "96%   Weight: 96.2 kg (212 lb)   Height: 1.676 m (5' 5.98\")     Physical Exam:  Gen: Well developed, well nourished. No acute distress.  Skin:  3mm elevated rough tan lesion left prox forarm . Mild tenderness.       Assessment/Plan:  1. SK (seborrheic keratosis)     since she is symptomatic with this lesion, will treat today with cryotherapy.   She is instructed in wound care and will call for concerns.      "

## 2021-01-06 DIAGNOSIS — J30.2 SEASONAL ALLERGIES: ICD-10-CM

## 2021-01-06 RX ORDER — MONTELUKAST SODIUM 10 MG/1
TABLET ORAL
Qty: 90 TAB | Refills: 3 | Status: SHIPPED | OUTPATIENT
Start: 2021-01-06 | End: 2021-11-24 | Stop reason: SDUPTHER

## 2021-01-25 PROBLEM — M54.9 MID BACK PAIN: Status: RESOLVED | Noted: 2017-04-30 | Resolved: 2021-01-25

## 2021-01-25 PROBLEM — Z00.00 ANNUAL PHYSICAL EXAM: Status: RESOLVED | Noted: 2020-01-16 | Resolved: 2021-01-25

## 2021-01-25 ASSESSMENT — ENCOUNTER SYMPTOMS
NAUSEA: 0
FEVER: 0
CONSTIPATION: 0
DIARRHEA: 0
PALPITATIONS: 0
SHORTNESS OF BREATH: 0
BLURRED VISION: 0
DEPRESSION: 0
CHILLS: 0
VOMITING: 0
WEAKNESS: 0

## 2021-01-25 NOTE — PROGRESS NOTES
History of Present Illness  55 year old female presents to clinic to establish care. ***    She does have a history of depression and is using Lexapro 20 mg daily for this. ***She denies any thoughts of self-harm, suicide, or harm to others.    She has a history of hypothyroidism for which she is taking Synthroid 112 mcg daily.  She has not ***had any changes in this dose recently. She denies any extreme changes in weight, palpitations, or heat/cold intolerance.      She denies any other questions or concerns at this time.    ROS  Review of Systems   Constitutional: Negative for chills and fever.   HENT: Negative for hearing loss.    Eyes: Negative for blurred vision.   Respiratory: Negative for shortness of breath.    Cardiovascular: Negative for chest pain and palpitations.   Gastrointestinal: Negative for constipation, diarrhea, nausea and vomiting.   Genitourinary: Negative for dysuria and hematuria.   Skin: Negative for rash.   Neurological: Negative for weakness.   Psychiatric/Behavioral: Negative for depression.     Medications  Current Outpatient Medications   Medication Sig Dispense Refill   • montelukast (SINGULAIR) 10 MG Tab TAKE ONE TABLET BY MOUTH DAILY 90 Tab 3   • albuterol (VENTOLIN HFA) 108 (90 Base) MCG/ACT Aero Soln inhalation aerosol Inhale 2 Puffs every four hours as needed for Shortness of Breath. 3 Each 3   • escitalopram (LEXAPRO) 20 MG tablet TAKE ONE TABLET BY MOUTH DAILY 90 Tab 3   • levothyroxine (SYNTHROID) 112 MCG Tab Take 1 Tab by mouth Every morning on an empty stomach. 90 Tab 3   • vitamin D, Ergocalciferol, (DRISDOL) 1.25 MG (50203 UT) Cap capsule Take 1 Cap by mouth every 7 days. 12 Cap 3   • cyclobenzaprine (FLEXERIL) 10 MG Tab Take 1 Tab by mouth 3 times a day as needed. 30 Tab 0     No current facility-administered medications for this visit.      Allergies  Allergies   Allergen Reactions   • Cleocin [Clindamycin Hcl]    • Pcn [Penicillins]    • Pcn [Penicillins]    • Percocet  [Oxycodone-Acetaminophen]      itching       Problem List  Patient Active Problem List   Diagnosis   • Hypothyroid   • Elevated cholesterol   • Mid back pain   • Mild asthma   • Recurrent major depressive disorder, in full remission (HCC)   • BMI 34.0-34.9,adult   • Seasonal allergies   • Annual physical exam   • Elevated glucose     Past Medical History  Past Medical History:   Diagnosis Date   • ASTHMA    • Backpain    • Depression    • Hypothyroid 9/20/2009   • Hypothyroid    • Psychiatric problem    • Thoracic vertebral fracture (HCC) 11/11    trauma   • Thyroid disease hyper s/p radioactive     Past Surgical History  Past Surgical History:   Procedure Laterality Date   • OTHER NEUROLOGICAL SURG     • [place] REDUCTION OF LARGE BREAST     • TONSILLECTOMY     • TUBAL COAGULATION LAPAROSCOPIC BILATERAL       Past Family History  Family History   Problem Relation Age of Onset   • Diabetes Mother    • Heart Disease Mother         MI in 60's   • Stroke Father         TIA   • Cancer Father         skin   • Heart Disease Maternal Grandmother    • Stroke Maternal Grandmother      Social History  She reports eating a healthy and balanced diet, as well as getting regular exercise. She works full time as a ***. She drinks an average of *** alcoholic beverages per week. She denies any tobacco product or illicit drug use. She is sexually active with ***, *** partner and uses *** as contraception.     Physical Exam  There were no vitals taken for this visit.  Physical Exam   Constitutional: She is well-developed, well-nourished, and in no distress. No distress.   HENT:   Head: Normocephalic and atraumatic.   Right Ear: Tympanic membrane, external ear and ear canal normal.   Left Ear: Tympanic membrane, external ear and ear canal normal.   Eyes: Pupils are equal, round, and reactive to light. Right eye exhibits no discharge. Left eye exhibits no discharge. No scleral icterus.   Neck: No thyromegaly present.   Cardiovascular: Normal  rate, regular rhythm and normal heart sounds.   Pulmonary/Chest: Effort normal and breath sounds normal. No respiratory distress.   Abdominal: Soft. Bowel sounds are normal. She exhibits no distension. There is no abdominal tenderness.   Musculoskeletal:         General: No edema.   Neurological: She is alert.   Skin: Skin is warm and dry. She is not diaphoretic.   Psychiatric: Affect and judgment normal.     Assessment & Plan        No follow-ups on file.    Sita Veloz M.D.

## 2021-01-27 ENCOUNTER — APPOINTMENT (OUTPATIENT)
Dept: MEDICAL GROUP | Facility: MEDICAL CENTER | Age: 56
End: 2021-01-27
Payer: COMMERCIAL

## 2021-02-04 ASSESSMENT — ENCOUNTER SYMPTOMS
DIARRHEA: 0
FEVER: 0
CHILLS: 0
NAUSEA: 0
VOMITING: 0
BLURRED VISION: 0
DEPRESSION: 0
WEAKNESS: 0
SHORTNESS OF BREATH: 0
CONSTIPATION: 0
PALPITATIONS: 0

## 2021-02-04 NOTE — PROGRESS NOTES
History of Present Illness  55 year old female presents to clinic to establish care.  She has a history of hypothyroidism, and is using Synthroid 112 mcg daily.  This dose was decreased within the last year.  She is doing well on it, she denies any extreme changes in weight, palpitations, or heat/cold intolerance.    Also has a history of depression, which is being managed with Lexapro 20 mg daily.  Mood is stable.  She denies any panic attacks.  Motivation and concentration are doing well.  She denies any thoughts of self-harm, suicide, or harm to others.    She has mild, intermittent asthma.  This is well controlled with albuterol as needed.  She only uses the albuterol a couple of times per year.    She has elevated cholesterol on previous testing, but is not and has not been on any medications for this.  Her ASCVD score is 1.5%.  She also had CT cardiac scoring with a result of 0.    Has a history of vitamin D deficiency, and is on supplementation for this.  Vitamin D levels normalized with supplementation last checked 8/20/2020.    She denies any other questions or concerns at this time.    ROS  Review of Systems   Constitutional: Negative for chills and fever.   HENT: Negative for hearing loss.    Eyes: Negative for blurred vision.   Respiratory: Negative for shortness of breath.    Cardiovascular: Negative for chest pain and palpitations.   Gastrointestinal: Negative for constipation, diarrhea, nausea and vomiting.   Genitourinary: Negative for dysuria and hematuria.   Skin: Negative for rash.   Neurological: Negative for weakness.   Psychiatric/Behavioral: Negative for depression.     Medications  Current Outpatient Medications   Medication Sig Dispense Refill   • montelukast (SINGULAIR) 10 MG Tab TAKE ONE TABLET BY MOUTH DAILY 90 Tab 3   • albuterol (VENTOLIN HFA) 108 (90 Base) MCG/ACT Aero Soln inhalation aerosol Inhale 2 Puffs every four hours as needed for Shortness of Breath. 3 Each 3   • escitalopram  (LEXAPRO) 20 MG tablet TAKE ONE TABLET BY MOUTH DAILY 90 Tab 3   • levothyroxine (SYNTHROID) 112 MCG Tab Take 1 Tab by mouth Every morning on an empty stomach. 90 Tab 3   • vitamin D, Ergocalciferol, (DRISDOL) 1.25 MG (98922 UT) Cap capsule Take 1 Cap by mouth every 7 days. 12 Cap 3     No current facility-administered medications for this visit.      Allergies  Allergies   Allergen Reactions   • Cleocin [Clindamycin Hcl]    • Pcn [Penicillins]    • Pcn [Penicillins]    • Percocet [Oxycodone-Acetaminophen]      itching     Problem List  Patient Active Problem List   Diagnosis   • Hypothyroid   • Elevated cholesterol   • Mild asthma   • Recurrent major depressive disorder, in full remission (HCC)   • Class 2 obesity due to excess calories without serious comorbidity with body mass index (BMI) of 35.0 to 35.9 in adult   • Seasonal allergies     Past Medical History  Past Medical History:   Diagnosis Date   • ASTHMA    • Backpain    • Depression    • Hypothyroid 9/20/2009   • Hypothyroid    • Psychiatric problem    • Thoracic vertebral fracture (HCC) 11/11    trauma   • Thyroid disease hyper s/p radioactive     Past Surgical History  Past Surgical History:   Procedure Laterality Date   • OTHER NEUROLOGICAL SURG     • [place] REDUCTION OF BREAST     • TONSILLECTOMY     • TUBAL COAGULATION LAPAROSCOPIC BILATERAL       Past Family History  Family History   Problem Relation Age of Onset   • Diabetes Mother    • Heart Disease Mother         MI in 60's   • Stroke Father         TIA   • Cancer Father         skin, non-melanoma   • Cancer Sister         skin, melanoma   • No Known Problems Brother    • Heart Disease Maternal Grandmother    • Stroke Maternal Grandmother    • No Known Problems Daughter    • Asthma Maternal Grandfather    • Cancer Paternal Grandmother         lung, former smoker   • Asthma Daughter    • No Known Problems Daughter    • No Known Problems Daughter      Social History  She reports eating a healthy and  "balanced diet, but does not get any regular exercise.  She works full time as an office manage. She denies any alcohol, tobacco product, or illicit drug use. She is not currently sexually active.     Physical Exam  /78 (BP Location: Left arm, Patient Position: Sitting, BP Cuff Size: Adult)   Pulse 75   Temp 36.5 °C (97.7 °F) (Temporal)   Resp 15   Ht 1.676 m (5' 6\")   Wt 101 kg (222 lb 10.6 oz)   SpO2 96%   BMI 35.94 kg/m²   Physical Exam   Constitutional: She is well-developed, well-nourished, and in no distress. No distress.   HENT:   Head: Normocephalic and atraumatic.   Right Ear: Tympanic membrane, external ear and ear canal normal.   Left Ear: Tympanic membrane, external ear and ear canal normal.   Eyes: Pupils are equal, round, and reactive to light. Right eye exhibits no discharge. Left eye exhibits no discharge. No scleral icterus.   Neck: No thyromegaly present.   Cardiovascular: Normal rate, regular rhythm and normal heart sounds.   Pulmonary/Chest: Effort normal and breath sounds normal. No respiratory distress.   Abdominal: Soft. Bowel sounds are normal. She exhibits no distension. There is no abdominal tenderness.   Musculoskeletal:         General: No edema.   Neurological: She is alert.   Skin: Skin is warm and dry. She is not diaphoretic.   Psychiatric: Affect and judgment normal.     Assessment & Plan  1. Visit for preventive health examination  Health maintenance status reviewed and updated. We discussed diet, exercise, vaccinations, skin cancer prevention and detection, seat belt use, and regular eye and dental exams.  - Comp Metabolic Panel; Future  - Lipid Profile; Future  - HEMOGLOBIN A1C; Future  - TSH WITH REFLEX TO FT4; Future  - VITAMIN D,25 HYDROXY; Future    2. Hypothyroidism, unspecified type  Chronic and stable.  Continue with the same medication plan for updated labs prior to her next appointment.  - TSH WITH REFLEX TO FT4; Future    3. Recurrent major depressive " disorder, in full remission (HCC)  Chronic and stable.  Continue Lexapro 20 mg daily.   2/8/2021   PHQ 5   IGNACIO 0     4. Mild intermittent asthma without complication  Chronic and stable.  Continue with albuterol as needed.    5. Elevated cholesterol  Chronic and stable.  No need for medications at this time.  We will get updated lipid panel prior to our next appointment.  - Lipid Profile; Future    6. Vitamin D deficiency  Chronic and stable.  Continue with vitamin D supplementation.  We will get updated vitamin D levels prior to our next appointment.  - VITAMIN D,25 HYDROXY; Future    Return in about 1 year (around 2/8/2022) for Annual physical.    Sita Veloz M.D.

## 2021-02-08 ENCOUNTER — OFFICE VISIT (OUTPATIENT)
Dept: MEDICAL GROUP | Facility: MEDICAL CENTER | Age: 56
End: 2021-02-08
Payer: COMMERCIAL

## 2021-02-08 VITALS
HEIGHT: 66 IN | RESPIRATION RATE: 15 BRPM | WEIGHT: 222.66 LBS | DIASTOLIC BLOOD PRESSURE: 78 MMHG | BODY MASS INDEX: 35.79 KG/M2 | HEART RATE: 75 BPM | TEMPERATURE: 97.7 F | SYSTOLIC BLOOD PRESSURE: 118 MMHG | OXYGEN SATURATION: 96 %

## 2021-02-08 DIAGNOSIS — E03.9 HYPOTHYROIDISM, UNSPECIFIED TYPE: ICD-10-CM

## 2021-02-08 DIAGNOSIS — Z00.00 VISIT FOR PREVENTIVE HEALTH EXAMINATION: ICD-10-CM

## 2021-02-08 DIAGNOSIS — J45.20 MILD INTERMITTENT ASTHMA WITHOUT COMPLICATION: ICD-10-CM

## 2021-02-08 DIAGNOSIS — E55.9 VITAMIN D DEFICIENCY: ICD-10-CM

## 2021-02-08 DIAGNOSIS — F33.42 RECURRENT MAJOR DEPRESSIVE DISORDER, IN FULL REMISSION (HCC): ICD-10-CM

## 2021-02-08 DIAGNOSIS — E78.00 ELEVATED CHOLESTEROL: ICD-10-CM

## 2021-02-08 PROBLEM — E66.812 CLASS 2 OBESITY DUE TO EXCESS CALORIES WITHOUT SERIOUS COMORBIDITY WITH BODY MASS INDEX (BMI) OF 35.0 TO 35.9 IN ADULT: Status: ACTIVE | Noted: 2018-04-05

## 2021-02-08 PROBLEM — E66.09 CLASS 2 OBESITY DUE TO EXCESS CALORIES WITHOUT SERIOUS COMORBIDITY WITH BODY MASS INDEX (BMI) OF 35.0 TO 35.9 IN ADULT: Status: ACTIVE | Noted: 2018-04-05

## 2021-02-08 PROBLEM — R73.09 ELEVATED GLUCOSE: Status: RESOLVED | Noted: 2020-07-16 | Resolved: 2021-02-08

## 2021-02-08 PROCEDURE — 99396 PREV VISIT EST AGE 40-64: CPT | Performed by: FAMILY MEDICINE

## 2021-02-08 ASSESSMENT — PATIENT HEALTH QUESTIONNAIRE - PHQ9
SUM OF ALL RESPONSES TO PHQ QUESTIONS 1-9: 5
CLINICAL INTERPRETATION OF PHQ2 SCORE: 1
5. POOR APPETITE OR OVEREATING: 0 - NOT AT ALL

## 2021-02-08 ASSESSMENT — ANXIETY QUESTIONNAIRES
4. TROUBLE RELAXING: NOT AT ALL
2. NOT BEING ABLE TO STOP OR CONTROL WORRYING: NOT AT ALL
7. FEELING AFRAID AS IF SOMETHING AWFUL MIGHT HAPPEN: NOT AT ALL
3. WORRYING TOO MUCH ABOUT DIFFERENT THINGS: NOT AT ALL
1. FEELING NERVOUS, ANXIOUS, OR ON EDGE: NOT AT ALL
GAD7 TOTAL SCORE: 0
5. BEING SO RESTLESS THAT IT IS HARD TO SIT STILL: NOT AT ALL
6. BECOMING EASILY ANNOYED OR IRRITABLE: NOT AT ALL

## 2021-02-08 ASSESSMENT — FIBROSIS 4 INDEX: FIB4 SCORE: 1.113

## 2021-02-16 RX ORDER — LEVOTHYROXINE SODIUM 112 UG/1
112 TABLET ORAL
Qty: 90 TABLET | Refills: 3 | Status: SHIPPED | OUTPATIENT
Start: 2021-02-16 | End: 2021-11-24 | Stop reason: SDUPTHER

## 2021-03-15 RX ORDER — ESCITALOPRAM OXALATE 20 MG/1
TABLET ORAL
Qty: 90 TABLET | Refills: 3 | Status: SHIPPED | OUTPATIENT
Start: 2021-03-15 | End: 2022-02-22 | Stop reason: SDUPTHER

## 2021-06-15 ENCOUNTER — PATIENT MESSAGE (OUTPATIENT)
Dept: MEDICAL GROUP | Facility: MEDICAL CENTER | Age: 56
End: 2021-06-15

## 2021-06-15 DIAGNOSIS — Z20.828 EXPOSURE TO SARS VIRUS: ICD-10-CM

## 2021-06-17 ENCOUNTER — HOSPITAL ENCOUNTER (OUTPATIENT)
Dept: LAB | Facility: MEDICAL CENTER | Age: 56
End: 2021-06-17
Attending: FAMILY MEDICINE
Payer: COMMERCIAL

## 2021-06-17 DIAGNOSIS — Z20.828 EXPOSURE TO SARS VIRUS: ICD-10-CM

## 2021-06-17 PROCEDURE — C9803 HOPD COVID-19 SPEC COLLECT: HCPCS

## 2021-06-17 PROCEDURE — U0005 INFEC AGEN DETEC AMPLI PROBE: HCPCS

## 2021-06-17 PROCEDURE — U0003 INFECTIOUS AGENT DETECTION BY NUCLEIC ACID (DNA OR RNA); SEVERE ACUTE RESPIRATORY SYNDROME CORONAVIRUS 2 (SARS-COV-2) (CORONAVIRUS DISEASE [COVID-19]), AMPLIFIED PROBE TECHNIQUE, MAKING USE OF HIGH THROUGHPUT TECHNOLOGIES AS DESCRIBED BY CMS-2020-01-R: HCPCS

## 2021-08-13 ENCOUNTER — TELEMEDICINE (OUTPATIENT)
Dept: MEDICAL GROUP | Facility: PHYSICIAN GROUP | Age: 56
End: 2021-08-13
Payer: COMMERCIAL

## 2021-08-13 VITALS — BODY MASS INDEX: 35.03 KG/M2 | RESPIRATION RATE: 14 BRPM | HEIGHT: 66 IN | WEIGHT: 218 LBS

## 2021-08-13 DIAGNOSIS — E78.5 DYSLIPIDEMIA: ICD-10-CM

## 2021-08-13 DIAGNOSIS — J30.2 SEASONAL ALLERGIES: ICD-10-CM

## 2021-08-13 DIAGNOSIS — Z13.0 SCREENING FOR DEFICIENCY ANEMIA: ICD-10-CM

## 2021-08-13 DIAGNOSIS — J45.20 MILD INTERMITTENT ASTHMA WITHOUT COMPLICATION: ICD-10-CM

## 2021-08-13 DIAGNOSIS — E55.9 VITAMIN D DEFICIENCY: ICD-10-CM

## 2021-08-13 DIAGNOSIS — E03.9 ACQUIRED HYPOTHYROIDISM: ICD-10-CM

## 2021-08-13 DIAGNOSIS — F33.0 MILD EPISODE OF RECURRENT MAJOR DEPRESSIVE DISORDER (HCC): ICD-10-CM

## 2021-08-13 PROBLEM — F32.9 MDD (MAJOR DEPRESSIVE DISORDER): Status: ACTIVE | Noted: 2017-12-08

## 2021-08-13 PROCEDURE — 99204 OFFICE O/P NEW MOD 45 MIN: CPT | Mod: 95 | Performed by: INTERNAL MEDICINE

## 2021-08-13 ASSESSMENT — FIBROSIS 4 INDEX: FIB4 SCORE: 1.113

## 2021-08-13 NOTE — PROGRESS NOTES
"Virtual Visit: New Patient   This visit was conducted via Zoom using secure and encrypted videoconferencing technology.   The patient was in a private location in the state of [place].    The patient's identity was confirmed and verbal consent was obtained for this virtual visit.    Subjective:     CC:   Chief Complaint   Patient presents with   • Establish Care       Ruby Sue is a 55 y.o. female presenting to establish care and to discuss the evaluation and management of:    The patient feels well.  She has no acute complaints.  She and her  are traveling across the country over the next year.        ROS  See HPI    Current medicines (including changes today)  Current Outpatient Medications   Medication Sig Dispense Refill   • escitalopram (LEXAPRO) 20 MG tablet TAKE ONE TABLET BY MOUTH DAILY 90 tablet 3   • levothyroxine (SYNTHROID) 112 MCG Tab Take 1 tablet by mouth Every morning on an empty stomach. 90 tablet 3   • montelukast (SINGULAIR) 10 MG Tab TAKE ONE TABLET BY MOUTH DAILY 90 Tab 3   • albuterol (VENTOLIN HFA) 108 (90 Base) MCG/ACT Aero Soln inhalation aerosol Inhale 2 Puffs every four hours as needed for Shortness of Breath. 3 Each 3   • vitamin D, Ergocalciferol, (DRISDOL) 1.25 MG (31852 UT) Cap capsule Take 1 Cap by mouth every 7 days. 12 Cap 3     No current facility-administered medications for this visit.       Patient Active Problem List    Diagnosis Date Noted   • Vitamin D deficiency 08/13/2021   • Seasonal allergies 05/14/2019   • Class 2 obesity due to excess calories without serious comorbidity with body mass index (BMI) of 35.0 to 35.9 in adult 04/05/2018   • MDD (major depressive disorder) 12/08/2017   • Dyslipidemia 04/30/2017   • Asthma 04/30/2017   • Acquired hypothyroidism 09/20/2009        Objective:   Resp 14   Ht 1.676 m (5' 6\")   Wt 98.9 kg (218 lb)   BMI 35.19 kg/m²     Physical Exam:  Constitutional: Alert, no distress, well-groomed.  Skin: No rashes in visible " areas.  Eye: Round. Conjunctiva clear, lids normal. No icterus.   ENMT: Lips pink without lesions, good dentition, moist mucous membranes. Phonation normal.  Neck: No masses, no thyromegaly. Moves freely without pain.  Respiratory: Unlabored respiratory effort, no cough or audible wheeze  Psych: Alert and oriented x3, normal affect and mood.     Assessment and Plan:   The following treatment plan was discussed:     Acquired hypothyroidism  This is a chronic condition.  Well-controlled.  The patient is on levothyroxine 112 mcg daily.  Labs from 8/20/2020 showed a low TSH of 0.248 with an elevated free T4 1.79.  -Continue levothyroxine 112 mcg daily  - TRIIDOTHYRONINE; Future  - FREE THYROXINE; Future  - TSH; Future    Dyslipidemia  This is a chronic condition.  Lipid panel from 5/12/2020 showed a total cholesterol 186, , HDL 49, triglycerides 117.  The 10-year ASCVD risk score (Michelleedwin RAZA Jr., et al., 2013) is: 1.5% CT cardiac score on 1/24/2020 was 0.  -Continue dietary management given the low 10-year ASCVD risk score  - Comp Metabolic Panel; Future  - Lipid Profile; Future    Mild episode of recurrent major depressive disorder (HCC)  This is a chronic condition.  Well-controlled.  The patient is on escitalopram 20 mg daily.  -Continue escitalopram 20 mg daily    Mild intermittent asthma without complication  Seasonal allergies  This is a chronic condition.  Well-controlled.  The patient is on montelukast 10 mg daily and albuterol as needed.  -Continue montelukast 10 mg daily  -Continue albuterol as needed    Vitamin D deficiency  This is a chronic condition.  Stable.  The patient is on vitamin D 50,000 units weekly.  Vitamin D level from 8/20/2020 was normal at 57.  -Continue vitamin D 50,000 units weekly, once she has completed this course she can transition to OTC vitamin D 800 units to 2000 units daily    Screening for deficiency anemia  - CBC WITHOUT DIFFERENTIAL; Future      Follow-up: Return in about 1  year (around 8/13/2022).     Please note that this dictation was created using voice recognition software. I have made every reasonable attempt to correct obvious errors, but I expect that there are errors of grammar and possibly content that I did not discover before finalizing the note.

## 2021-11-24 DIAGNOSIS — J30.2 SEASONAL ALLERGIES: ICD-10-CM

## 2021-11-24 DIAGNOSIS — E55.9 VITAMIN D DEFICIENCY: ICD-10-CM

## 2021-11-25 NOTE — TELEPHONE ENCOUNTER
----- Message from Ruby Sue sent at 11/24/2021 11:31 AM PST -----  Regarding: Refill request  I have put in several refill request for my levothyroxin and montelukast.  Not sure if you are getting them since I just recently switched over to you.     Also, I know you said  I didn't need to keep refilling the vitamin D and could just do over the counter, but I don't remember how much you said to take.      Thanks.

## 2021-11-29 ENCOUNTER — PATIENT MESSAGE (OUTPATIENT)
Dept: MEDICAL GROUP | Facility: PHYSICIAN GROUP | Age: 56
End: 2021-11-29

## 2021-11-29 DIAGNOSIS — E55.9 VITAMIN D DEFICIENCY: ICD-10-CM

## 2021-11-29 RX ORDER — ERGOCALCIFEROL 1.25 MG/1
50000 CAPSULE ORAL
Qty: 12 CAPSULE | Refills: 3 | Status: SHIPPED | OUTPATIENT
Start: 2021-11-29 | End: 2022-12-01 | Stop reason: SDUPTHER

## 2021-11-29 RX ORDER — MONTELUKAST SODIUM 10 MG/1
10 TABLET ORAL DAILY
Qty: 90 TABLET | Refills: 3 | OUTPATIENT
Start: 2021-11-29

## 2021-11-29 RX ORDER — ERGOCALCIFEROL 1.25 MG/1
50000 CAPSULE ORAL
Qty: 12 CAPSULE | Refills: 3 | Status: CANCELLED | OUTPATIENT
Start: 2021-11-29

## 2021-11-29 RX ORDER — LEVOTHYROXINE SODIUM 112 UG/1
112 TABLET ORAL
Qty: 90 TABLET | Refills: 3 | Status: SHIPPED | OUTPATIENT
Start: 2021-11-29 | End: 2022-12-01 | Stop reason: SDUPTHER

## 2021-11-29 RX ORDER — LEVOTHYROXINE SODIUM 112 UG/1
112 TABLET ORAL
Qty: 90 TABLET | Refills: 3 | OUTPATIENT
Start: 2021-11-29

## 2021-11-29 RX ORDER — MONTELUKAST SODIUM 10 MG/1
10 TABLET ORAL DAILY
Qty: 90 TABLET | Refills: 3 | Status: SHIPPED | OUTPATIENT
Start: 2021-11-29 | End: 2022-12-01 | Stop reason: SDUPTHER

## 2021-12-24 ENCOUNTER — HOSPITAL ENCOUNTER (OUTPATIENT)
Dept: LAB | Facility: MEDICAL CENTER | Age: 56
End: 2021-12-24
Attending: INTERNAL MEDICINE
Payer: COMMERCIAL

## 2021-12-24 DIAGNOSIS — E78.5 DYSLIPIDEMIA: ICD-10-CM

## 2021-12-24 DIAGNOSIS — Z13.0 SCREENING FOR DEFICIENCY ANEMIA: ICD-10-CM

## 2021-12-24 DIAGNOSIS — E55.9 VITAMIN D DEFICIENCY: ICD-10-CM

## 2021-12-24 DIAGNOSIS — E03.9 ACQUIRED HYPOTHYROIDISM: ICD-10-CM

## 2021-12-24 LAB
25(OH)D3 SERPL-MCNC: 53 NG/ML (ref 30–100)
ALBUMIN SERPL BCP-MCNC: 4.4 G/DL (ref 3.2–4.9)
ALBUMIN/GLOB SERPL: 1.5 G/DL
ALP SERPL-CCNC: 72 U/L (ref 30–99)
ALT SERPL-CCNC: 19 U/L (ref 2–50)
ANION GAP SERPL CALC-SCNC: 10 MMOL/L (ref 7–16)
AST SERPL-CCNC: 16 U/L (ref 12–45)
BILIRUB SERPL-MCNC: 0.9 MG/DL (ref 0.1–1.5)
BUN SERPL-MCNC: 14 MG/DL (ref 8–22)
CALCIUM SERPL-MCNC: 9.4 MG/DL (ref 8.5–10.5)
CHLORIDE SERPL-SCNC: 104 MMOL/L (ref 96–112)
CHOLEST SERPL-MCNC: 238 MG/DL (ref 100–199)
CO2 SERPL-SCNC: 26 MMOL/L (ref 20–33)
CREAT SERPL-MCNC: 0.8 MG/DL (ref 0.5–1.4)
ERYTHROCYTE [DISTWIDTH] IN BLOOD BY AUTOMATED COUNT: 44.1 FL (ref 35.9–50)
FASTING STATUS PATIENT QL REPORTED: NORMAL
GLOBULIN SER CALC-MCNC: 2.9 G/DL (ref 1.9–3.5)
GLUCOSE SERPL-MCNC: 103 MG/DL (ref 65–99)
HCT VFR BLD AUTO: 41.3 % (ref 37–47)
HDLC SERPL-MCNC: 56 MG/DL
HGB BLD-MCNC: 14 G/DL (ref 12–16)
LDLC SERPL CALC-MCNC: 167 MG/DL
MCH RBC QN AUTO: 30.6 PG (ref 27–33)
MCHC RBC AUTO-ENTMCNC: 33.9 G/DL (ref 33.6–35)
MCV RBC AUTO: 90.2 FL (ref 81.4–97.8)
PLATELET # BLD AUTO: 268 K/UL (ref 164–446)
PMV BLD AUTO: 10.6 FL (ref 9–12.9)
POTASSIUM SERPL-SCNC: 4.2 MMOL/L (ref 3.6–5.5)
PROT SERPL-MCNC: 7.3 G/DL (ref 6–8.2)
RBC # BLD AUTO: 4.58 M/UL (ref 4.2–5.4)
SODIUM SERPL-SCNC: 140 MMOL/L (ref 135–145)
T3 SERPL-MCNC: 75.2 NG/DL (ref 60–181)
T4 FREE SERPL-MCNC: 1.27 NG/DL (ref 0.93–1.7)
TRIGL SERPL-MCNC: 73 MG/DL (ref 0–149)
TSH SERPL DL<=0.005 MIU/L-ACNC: 1.57 UIU/ML (ref 0.38–5.33)
WBC # BLD AUTO: 6.5 K/UL (ref 4.8–10.8)

## 2021-12-24 PROCEDURE — 85027 COMPLETE CBC AUTOMATED: CPT

## 2021-12-24 PROCEDURE — 80053 COMPREHEN METABOLIC PANEL: CPT

## 2021-12-24 PROCEDURE — 36415 COLL VENOUS BLD VENIPUNCTURE: CPT

## 2021-12-24 PROCEDURE — 80061 LIPID PANEL: CPT

## 2021-12-24 PROCEDURE — 84443 ASSAY THYROID STIM HORMONE: CPT

## 2021-12-24 PROCEDURE — 82306 VITAMIN D 25 HYDROXY: CPT

## 2021-12-24 PROCEDURE — 84480 ASSAY TRIIODOTHYRONINE (T3): CPT

## 2021-12-24 PROCEDURE — 84439 ASSAY OF FREE THYROXINE: CPT

## 2022-02-07 ENCOUNTER — APPOINTMENT (OUTPATIENT)
Dept: MEDICAL GROUP | Facility: MEDICAL CENTER | Age: 57
End: 2022-02-07
Payer: COMMERCIAL

## 2022-02-15 NOTE — PROGRESS NOTES
Virtual Visit: Established Patient   This visit was conducted via Zoom using secure and encrypted videoconferencing technology. The patient was in a private location in the state of [place].    The patient's identity was confirmed and verbal consent was obtained for this virtual visit.    Subjective:   CC: No chief complaint on file.        Ruby Sue is a 56 y.o. female presenting for evaluation and management of:        ROS   Denies any recent fevers or chills. No nausea or vomiting. No chest pains or shortness of breath.     Allergies   Allergen Reactions   • Cleocin [Clindamycin Hcl]    • Pcn [Penicillins]    • Pcn [Penicillins]    • Percocet [Oxycodone-Acetaminophen]      itching       Current medicines (including changes today)  Current Outpatient Medications   Medication Sig Dispense Refill   • montelukast (SINGULAIR) 10 MG Tab Take 1 Tablet by mouth every day. 90 Tablet 3   • levothyroxine (SYNTHROID) 112 MCG Tab Take 1 Tablet by mouth every morning on an empty stomach. 90 Tablet 3   • vitamin D2, Ergocalciferol, (DRISDOL) 1.25 MG (96020 UT) Cap capsule Take 1 Capsule by mouth every 7 days. 12 Capsule 3   • escitalopram (LEXAPRO) 20 MG tablet TAKE ONE TABLET BY MOUTH DAILY 90 tablet 3   • albuterol (VENTOLIN HFA) 108 (90 Base) MCG/ACT Aero Soln inhalation aerosol Inhale 2 Puffs every four hours as needed for Shortness of Breath. 3 Each 3     No current facility-administered medications for this visit.       Patient Active Problem List    Diagnosis Date Noted   • Vitamin D deficiency 08/13/2021   • Seasonal allergies 05/14/2019   • Class 2 obesity due to excess calories without serious comorbidity with body mass index (BMI) of 35.0 to 35.9 in adult 04/05/2018   • MDD (major depressive disorder) 12/08/2017   • Dyslipidemia 04/30/2017   • Asthma 04/30/2017   • Acquired hypothyroidism 09/20/2009       Family History   Problem Relation Age of Onset   • Diabetes Mother    • Heart Disease Mother         MI in  60's   • Stroke Father         TIA   • Cancer Father         skin, non-melanoma   • Cancer Sister         skin, melanoma   • No Known Problems Brother    • Heart Disease Maternal Grandmother    • Stroke Maternal Grandmother    • No Known Problems Daughter    • Asthma Maternal Grandfather    • Cancer Paternal Grandmother         lung, former smoker   • Asthma Daughter    • No Known Problems Daughter    • No Known Problems Daughter        She  has a past medical history of ASTHMA, Backpain, Depression, Hypothyroid (9/20/2009), Hypothyroid, Psychiatric problem, Thoracic vertebral fracture (HCC) (11/11), and Thyroid disease (hyper s/p radioactive). She also has no past medical history of Angina, ASTHMA, Bronchitis, Dialysis, Fall, Heart valve disease, Indigestion, Infectious disease, Jaundice, Other specified symptom associated with female genital organs, Pacemaker, Personal history of venous thrombosis and embolism, Pneumonia, Rheumatic fever, Unspecified hemorrhagic conditions, or Unspecified urinary incontinence.  She  has a past surgical history that includes tonsillectomy; tubal coagulation laparoscopic bilateral; other neurological surg; and pr breast reduction.       Objective:   There were no vitals taken for this visit.    Physical Exam:  Constitutional: Alert, no distress, well-groomed.  Skin: No rashes in visible areas.  Eye: Round. Conjunctiva clear, lids normal. No icterus.   ENMT: Lips pink without lesions, good dentition, moist mucous membranes. Phonation normal.  Neck: No masses, no thyromegaly. Moves freely without pain.  Respiratory: Unlabored respiratory effort, no cough or audible wheeze  Psych: Alert and oriented x3, normal affect and mood.       Assessment and Plan:   The following treatment plan was discussed:     Make sure she is in [place]    Acquired hypothyroidism  This is a medical chronic condition.  Well-controlled.  The patient is on levothyroxine 112 mcg daily.    Labs from 12/24/2021  showed a normal TSH of 1.57 and a normal free T4 of 1.27.  The patient is clinically euthyroid.  -Continue levothyroxine 112 mcg daily  - FREE THYROXINE; Future  - TSH; Future     Mixed hyperlipidemia  This is a chronic medical condition.    The patient is diet controlled.  Lipid panel from 12/24/2021 showed a total cholesterol 238, , HDL 56, triglycerides 73.   The 10-year ASCVD risk score (Canaan RY Jr., et al., 2013) is: 1.8% CT cardiac score on 1/24/2020 was 0.  -Continue dietary management with a low-cholesterol diet given the patient's low 10-year ASCVD risk score  - Comp Metabolic Panel; Future  - Lipid Profile; Future    Elevated fasting glucose  This is a chronic medical condition.  Fasting glucose from 12/24/2021 was slightly elevated at 103.  Hemoglobin A1c from 8/20/2020 was normal at 5.4.     Mild episode of recurrent major depressive disorder (HCC)  This is a chronic medical condition.  Well-controlled.  The patient is on escitalopram 20 mg daily.  -Continue escitalopram 20 mg daily     Mild intermittent asthma without complication  Seasonal allergies  This is a chronic medical condition.  Well-controlled.  The patient is on montelukast 10 mg daily and albuterol as needed.  -Continue montelukast 10 mg daily  -Continue albuterol as needed       My total time spent caring for the patient on the day of the encounter was *** minutes.   This does not include time spent on separately billable procedures/tests.        Follow-up: No follow-ups on file.     Please note that this dictation was created using voice recognition software. I have made every reasonable attempt to correct obvious errors, but I expect that there are errors of grammar and possibly content that I did not discover before finalizing the note.

## 2022-02-17 ENCOUNTER — APPOINTMENT (OUTPATIENT)
Dept: MEDICAL GROUP | Facility: PHYSICIAN GROUP | Age: 57
End: 2022-02-17
Payer: COMMERCIAL

## 2022-02-17 PROBLEM — E55.9 VITAMIN D DEFICIENCY: Status: RESOLVED | Noted: 2021-08-13 | Resolved: 2022-02-17

## 2022-02-17 PROBLEM — J45.20 MILD INTERMITTENT ASTHMA WITHOUT COMPLICATION: Status: ACTIVE | Noted: 2017-04-30

## 2022-02-17 PROBLEM — R73.01 ELEVATED FASTING GLUCOSE: Status: ACTIVE | Noted: 2022-02-17

## 2022-02-17 PROBLEM — E78.2 MIXED HYPERLIPIDEMIA: Status: ACTIVE | Noted: 2017-04-30

## 2022-02-22 RX ORDER — ESCITALOPRAM OXALATE 20 MG/1
TABLET ORAL
Qty: 90 TABLET | Refills: 0 | Status: SHIPPED | OUTPATIENT
Start: 2022-02-22 | End: 2022-12-01 | Stop reason: SDUPTHER

## 2022-07-04 ENCOUNTER — HOSPITAL ENCOUNTER (OUTPATIENT)
Facility: MEDICAL CENTER | Age: 57
End: 2022-07-04
Attending: NURSE PRACTITIONER
Payer: COMMERCIAL

## 2022-09-27 ENCOUNTER — OFFICE VISIT (OUTPATIENT)
Dept: URGENT CARE | Facility: PHYSICIAN GROUP | Age: 57
End: 2022-09-27
Payer: COMMERCIAL

## 2022-09-27 VITALS
DIASTOLIC BLOOD PRESSURE: 74 MMHG | WEIGHT: 200 LBS | HEIGHT: 66 IN | TEMPERATURE: 97.4 F | SYSTOLIC BLOOD PRESSURE: 118 MMHG | HEART RATE: 74 BPM | BODY MASS INDEX: 32.14 KG/M2 | OXYGEN SATURATION: 97 % | RESPIRATION RATE: 16 BRPM

## 2022-09-27 DIAGNOSIS — S16.1XXA CERVICAL STRAIN, ACUTE, INITIAL ENCOUNTER: ICD-10-CM

## 2022-09-27 DIAGNOSIS — E55.9 VITAMIN D DEFICIENCY: ICD-10-CM
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
## 2022-09-27 PROCEDURE — 99213 OFFICE O/P EST LOW 20 MIN: CPT | Performed by: EMERGENCY MEDICINE

## 2022-09-27 RX ORDER — CYCLOBENZAPRINE HCL 10 MG
10 TABLET ORAL 3 TIMES DAILY PRN
Qty: 15 TABLET | Refills: 0 | Status: SHIPPED
Start: 2022-09-27 | End: 2023-01-12

## 2022-09-27 RX ORDER — ERGOCALCIFEROL 1.25 MG/1
50000 CAPSULE ORAL
Qty: 12 CAPSULE | Refills: 3 | OUTPATIENT
Start: 2022-09-27

## 2022-09-27 RX ORDER — MELOXICAM 7.5 MG/1
7.5 TABLET ORAL DAILY
Qty: 15 TABLET | Refills: 0 | Status: SHIPPED
Start: 2022-09-27 | End: 2023-01-12

## 2022-09-27 ASSESSMENT — ENCOUNTER SYMPTOMS
WEAKNESS: 0
NECK PAIN: 1
SENSORY CHANGE: 0
LOSS OF CONSCIOUSNESS: 0
HEADACHES: 1
FOCAL WEAKNESS: 0

## 2022-09-27 ASSESSMENT — FIBROSIS 4 INDEX: FIB4 SCORE: 0.74
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
## 2022-12-01 ENCOUNTER — OFFICE VISIT (OUTPATIENT)
Dept: MEDICAL GROUP | Facility: PHYSICIAN GROUP | Age: 57
End: 2022-12-01
Payer: COMMERCIAL

## 2022-12-01 VITALS
BODY MASS INDEX: 33.43 KG/M2 | WEIGHT: 208 LBS | OXYGEN SATURATION: 98 % | DIASTOLIC BLOOD PRESSURE: 70 MMHG | SYSTOLIC BLOOD PRESSURE: 108 MMHG | HEIGHT: 66 IN | HEART RATE: 73 BPM | TEMPERATURE: 97.6 F | RESPIRATION RATE: 18 BRPM

## 2022-12-01 DIAGNOSIS — E03.9 ACQUIRED HYPOTHYROIDISM: ICD-10-CM

## 2022-12-01 DIAGNOSIS — E55.9 VITAMIN D DEFICIENCY: ICD-10-CM

## 2022-12-01 DIAGNOSIS — F33.0 MILD EPISODE OF RECURRENT MAJOR DEPRESSIVE DISORDER (HCC): ICD-10-CM

## 2022-12-01 DIAGNOSIS — J30.2 SEASONAL ALLERGIES: ICD-10-CM

## 2022-12-01 DIAGNOSIS — J45.20 MILD INTERMITTENT ASTHMA WITHOUT COMPLICATION: ICD-10-CM

## 2022-12-01 DIAGNOSIS — Z23 NEED FOR VACCINATION: ICD-10-CM

## 2022-12-01 DIAGNOSIS — Z12.31 ENCOUNTER FOR SCREENING MAMMOGRAM FOR BREAST CANCER: ICD-10-CM

## 2022-12-01 DIAGNOSIS — E78.2 MIXED HYPERLIPIDEMIA: ICD-10-CM

## 2022-12-01 PROCEDURE — 99214 OFFICE O/P EST MOD 30 MIN: CPT | Mod: 25 | Performed by: INTERNAL MEDICINE

## 2022-12-01 PROCEDURE — 90677 PCV20 VACCINE IM: CPT | Performed by: INTERNAL MEDICINE

## 2022-12-01 PROCEDURE — 90471 IMMUNIZATION ADMIN: CPT | Performed by: INTERNAL MEDICINE

## 2022-12-01 RX ORDER — LEVOTHYROXINE SODIUM 112 UG/1
112 TABLET ORAL
Qty: 90 TABLET | Refills: 3 | Status: SHIPPED | OUTPATIENT
Start: 2022-12-01 | End: 2023-11-01 | Stop reason: SDUPTHER

## 2022-12-01 RX ORDER — ERGOCALCIFEROL 1.25 MG/1
50000 CAPSULE ORAL
Qty: 12 CAPSULE | Refills: 3 | Status: SHIPPED | OUTPATIENT
Start: 2022-12-01 | End: 2023-02-16

## 2022-12-01 RX ORDER — ESCITALOPRAM OXALATE 20 MG/1
TABLET ORAL
Qty: 90 TABLET | Refills: 3 | Status: SHIPPED | OUTPATIENT
Start: 2022-12-01 | End: 2023-11-01 | Stop reason: SDUPTHER

## 2022-12-01 RX ORDER — MONTELUKAST SODIUM 10 MG/1
10 TABLET ORAL DAILY
Qty: 90 TABLET | Refills: 3 | Status: SHIPPED | OUTPATIENT
Start: 2022-12-01 | End: 2023-11-01 | Stop reason: SDUPTHER

## 2022-12-01 ASSESSMENT — PATIENT HEALTH QUESTIONNAIRE - PHQ9: CLINICAL INTERPRETATION OF PHQ2 SCORE: 0

## 2022-12-01 ASSESSMENT — FIBROSIS 4 INDEX: FIB4 SCORE: 0.74
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
## 2022-12-28 ENCOUNTER — TELEPHONE (OUTPATIENT)
Dept: SCHEDULING | Facility: IMAGING CENTER | Age: 57
End: 2022-12-28

## 2023-01-06 ENCOUNTER — APPOINTMENT (OUTPATIENT)
Dept: RADIOLOGY | Facility: MEDICAL CENTER | Age: 58
End: 2023-01-06
Attending: INTERNAL MEDICINE
Payer: COMMERCIAL

## 2023-01-06 DIAGNOSIS — Z12.31 ENCOUNTER FOR SCREENING MAMMOGRAM FOR BREAST CANCER: ICD-10-CM

## 2023-01-06 PROCEDURE — 77067 SCR MAMMO BI INCL CAD: CPT

## 2023-01-12 ENCOUNTER — OFFICE VISIT (OUTPATIENT)
Dept: MEDICAL GROUP | Facility: PHYSICIAN GROUP | Age: 58
End: 2023-01-12
Payer: COMMERCIAL

## 2023-01-12 VITALS
SYSTOLIC BLOOD PRESSURE: 102 MMHG | DIASTOLIC BLOOD PRESSURE: 70 MMHG | HEIGHT: 66 IN | RESPIRATION RATE: 13 BRPM | BODY MASS INDEX: 34.39 KG/M2 | WEIGHT: 214 LBS | HEART RATE: 82 BPM | OXYGEN SATURATION: 98 % | TEMPERATURE: 97.3 F

## 2023-01-12 DIAGNOSIS — E55.9 VITAMIN D DEFICIENCY: ICD-10-CM

## 2023-01-12 DIAGNOSIS — J45.20 MILD INTERMITTENT ASTHMA WITHOUT COMPLICATION: ICD-10-CM

## 2023-01-12 DIAGNOSIS — Z76.89 ENCOUNTER TO ESTABLISH CARE: ICD-10-CM

## 2023-01-12 DIAGNOSIS — R73.01 ELEVATED FASTING GLUCOSE: ICD-10-CM

## 2023-01-12 DIAGNOSIS — E03.9 ACQUIRED HYPOTHYROIDISM: ICD-10-CM

## 2023-01-12 DIAGNOSIS — F33.0 MILD EPISODE OF RECURRENT MAJOR DEPRESSIVE DISORDER (HCC): ICD-10-CM

## 2023-01-12 DIAGNOSIS — E78.2 MIXED HYPERLIPIDEMIA: ICD-10-CM

## 2023-01-12 PROCEDURE — 99214 OFFICE O/P EST MOD 30 MIN: CPT | Performed by: STUDENT IN AN ORGANIZED HEALTH CARE EDUCATION/TRAINING PROGRAM

## 2023-01-12 ASSESSMENT — PATIENT HEALTH QUESTIONNAIRE - PHQ9
6. FEELING BAD ABOUT YOURSELF - OR THAT YOU ARE A FAILURE OR HAVE LET YOURSELF OR YOUR FAMILY DOWN: NOT AL ALL
5. POOR APPETITE OR OVEREATING: NOT AT ALL
8. MOVING OR SPEAKING SO SLOWLY THAT OTHER PEOPLE COULD HAVE NOTICED. OR THE OPPOSITE, BEING SO FIGETY OR RESTLESS THAT YOU HAVE BEEN MOVING AROUND A LOT MORE THAN USUAL: NOT AT ALL
1. LITTLE INTEREST OR PLEASURE IN DOING THINGS: NOT AT ALL
7. TROUBLE CONCENTRATING ON THINGS, SUCH AS READING THE NEWSPAPER OR WATCHING TELEVISION: NOT AT ALL
4. FEELING TIRED OR HAVING LITTLE ENERGY: NOT AT ALL
3. TROUBLE FALLING OR STAYING ASLEEP OR SLEEPING TOO MUCH: NOT AT ALL
SUM OF ALL RESPONSES TO PHQ9 QUESTIONS 1 AND 2: 0
2. FEELING DOWN, DEPRESSED, IRRITABLE, OR HOPELESS: NOT AT ALL
9. THOUGHTS THAT YOU WOULD BE BETTER OFF DEAD, OR OF HURTING YOURSELF: NOT AT ALL
SUM OF ALL RESPONSES TO PHQ QUESTIONS 1-9: 0

## 2023-01-12 ASSESSMENT — FIBROSIS 4 INDEX: FIB4 SCORE: 0.75

## 2023-01-12 NOTE — ASSESSMENT & PLAN NOTE
Patient reports she was initially hyperthyroid and treated with radioiodone resulting in hypothyroidism.  Patient no longer follows up with Endocrinology.  Currently on Levothyroxine 112 mcg daily.

## 2023-01-12 NOTE — PROGRESS NOTES
Subjective:     CC:  establish care    HISTORY OF THE PRESENT ILLNESS: Patient is a 57 y.o. female here today to establish care.    MDD (major depressive disorder)  Stable on Lexapro 20 mg daily.    Acquired hypothyroidism  Patient reports she was initially hyperthyroid and treated with radioiodone resulting in hypothyroidism.  Patient no longer follows up with Endocrinology.  Currently on Levothyroxine 112 mcg daily.    Mild intermittent asthma without complication  Well-controlled with Singulair 10 mg daily and albuterol as needed.    Health Maintenance: Completed    Allergies   Allergen Reactions    Cleocin [Clindamycin Hcl]     Pcn [Penicillins]      Patient Active Problem List   Diagnosis    Acquired hypothyroidism    Mixed hyperlipidemia    Mild intermittent asthma without complication    MDD (major depressive disorder)    Class 2 obesity due to excess calories without serious comorbidity with body mass index (BMI) of 35.0 to 35.9 in adult    Seasonal allergies    Elevated fasting glucose     Current Outpatient Medications   Medication Sig Dispense Refill    escitalopram (LEXAPRO) 20 MG tablet TAKE ONE TABLET BY MOUTH DAILY 90 Tablet 3    levothyroxine (SYNTHROID) 112 MCG Tab Take 1 Tablet by mouth every morning on an empty stomach. 90 Tablet 3    montelukast (SINGULAIR) 10 MG Tab Take 1 Tablet by mouth every day. 90 Tablet 3    vitamin D2, Ergocalciferol, (DRISDOL) 1.25 MG (07381 UT) Cap capsule Take 1 Capsule by mouth every 7 days. 12 Capsule 3    albuterol (VENTOLIN HFA) 108 (90 Base) MCG/ACT Aero Soln inhalation aerosol Inhale 2 Puffs every four hours as needed for Shortness of Breath. 3 Each 3     No current facility-administered medications for this visit.     Past Surgical History:   Procedure Laterality Date    [place] BREAST REDUCTION      TONSILLECTOMY      TUBAL COAGULATION LAPAROSCOPIC BILATERAL        Social History     Socioeconomic History    Marital status:      Spouse name: Not on file     Number of children: 4    Years of education: Not on file    Highest education level: 12th grade   Occupational History    Occupation: [employer]   Tobacco Use    Smoking status: Former     Types: Cigarettes    Smokeless tobacco: Never    Tobacco comments:     Smoked on/off for 10-15 years, 1/2-1 ppd   Vaping Use    Vaping Use: Never used   Substance and Sexual Activity    Alcohol use: Yes     Alcohol/week: 1.2 oz     Types: 2 Glasses of wine per week    Drug use: No    Sexual activity: Not Currently     Partners: Male     Birth control/protection: Female Sterilization     Comment:    Other Topics Concern    Not on file   Social History Narrative    4 children. Previously worked at 's dept now at [employer] office.      Social Determinants of Health     Financial Resource Strain: Low Risk     Difficulty of Paying Living Expenses: Not hard at all   Food Insecurity: No Food Insecurity    Worried About Running Out of Food in the Last Year: Never true    Ran Out of Food in the Last Year: Never true   Transportation Needs: No Transportation Needs    Lack of Transportation (Medical): No    Lack of Transportation (Non-Medical): No   Physical Activity: Inactive    Days of Exercise per Week: 0 days    Minutes of Exercise per Session: 0 min   Stress: No Stress Concern Present    Feeling of Stress : Only a little   Social Connections: Socially Isolated    Frequency of Communication with Friends and Family: Once a week    Frequency of Social Gatherings with Friends and Family: Once a week    Attends [employer] Services: Never    Active Member of Clubs or Organizations: No    Attends Club or Organization Meetings: Never    Marital Status:    Intimate Partner Violence: Not on file   Housing Stability: High Risk    Unable to Pay for Housing in the Last Year: No    Number of Places Lived in the Last Year: 5    Unstable Housing in the Last Year: No     Family History   Problem Relation Age of Onset    Diabetes Mother     Heart  "Disease Mother         MI in 60's    Stroke Father         TIA    Cancer Father         skin, non-melanoma    Cancer Sister         skin, melanoma    No Known Problems Brother     Heart Disease Maternal Grandmother     Stroke Maternal Grandmother     Asthma Maternal Grandfather     Cancer Paternal Grandmother         lung, former smoker    No Known Problems Daughter     Asthma Daughter     No Known Problems Daughter     No Known Problems Daughter     Breast Cancer Neg Hx     Colorectal Cancer Neg Hx     Peritoneal Cancer Neg Hx     Tubal Cancer Neg Hx     Ovarian Cancer Neg Hx          ROS:     Constitutional:  Negative for chills, fever, fatigue, weight loss.  HEENT:  Negative for blurred vision, hearing loss, sore throat.    Respiratory:  Negative for cough, sputum production and shortness of breath.  Cardiovascular:  Negative for chest pain, palpitations and leg swelling.  Gastrointestinal:  Negative for abdominal pain, blood in stool, constipation, diarrhea and vomiting.   Musculoskeletal:  Negative for back pain, falls, joint pain and neck pain.   Skin:  Negative for rash.   Neurological:  Negative for dizziness, seizures, weakness and headaches.   Endo/Heme/Allergies:  Does not bruise/bleed easily.   Psychiatric/Behavioral:  Negative for depression, anxiety and suicidal thoughts.      Objective:     Exam: /70   Pulse 82   Temp 36.3 °C (97.3 °F) (Temporal)   Resp 13   Ht 1.676 m (5' 6\")   Wt 97.1 kg (214 lb)   SpO2 98%  Body mass index is 34.54 kg/m².    Gen: Alert and oriented, no acute distress.  Eyes:  PERRL, conjunctivae clear, lids normal.   Neck: Neck is supple, trachea middle, no palpable lymphadenopathy or thyromegaly.  Lungs: Normal effort, CTAB, no wheezing / rhonchi / rales.  CV: RRR, normal S1 and S2, no murmurs.  GI:  Abdomen soft, non-tender, non-distended with normal bowel sounds.  MSK:  Normal ROM.  Ext: No clubbing, cyanosis, or edema.  Skin:  Warm and dry with no rashes or " lesions.  Neuro: AAO x 3, no acute focal deficits.  Psych: Normal affect and mood.      Assessment & Plan:   57 y.o. female with the following -    1. Encounter to establish care  Patient presents today to establish care.  History was discussed in detail with the patient.  Routine labs ordered.  Patient is unsure of hepatitis B vaccine status and confirmatory lab ordered.  Pap smear will be done at the next visit.  - CBC WITHOUT DIFFERENTIAL; Future  - Comp Metabolic Panel; Future  - HEP B SURFACE AB; Future    2. Mixed hyperlipidemia  Chronic.  December 2021  and .  Repeat lipid panel ordered.  Consider starting medication based on ASCVD risk.  - Lipid Profile; Future    3. Acquired hypothyroidism  Chronic.  December 2021 TSH, T3, T4 WNL.  Continue levothyroxine 112 mcg daily.  - TSH+FREE T4    4. Elevated fasting glucose  Lab review shows multiple elevated fasting glucose readings over the past 3 years.  A1c ordered.  - HEMOGLOBIN A1C; Future    5. Mild episode of recurrent major depressive disorder (HCC)  Chronic, stable.  Continue Lexapro 20 mg daily.    6. Mild intermittent asthma without complication  Chronic, stable.  Continue singular 10 mg daily and albuterol as needed.    7. Vitamin D deficiency  Currently on vitamin D 50,000 units daily.  Repeat vitamin D level ordered.  - VITAMIN D,25 HYDROXY (DEFICIENCY); Future          I spent a total of 30 minutes with record review, exam, communication with the patient, communication with other providers, and documentation of this encounter.    Return in about 4 weeks (around 2/9/2023) for Gyn exam.    Please note that this dictation was created using voice recognition software. I have made every reasonable attempt to correct obvious errors, but I expect that there are errors of grammar and possibly content that I did not discover before finalizing the note.

## 2023-01-26 ENCOUNTER — HOSPITAL ENCOUNTER (OUTPATIENT)
Dept: LAB | Facility: MEDICAL CENTER | Age: 58
End: 2023-01-26
Attending: STUDENT IN AN ORGANIZED HEALTH CARE EDUCATION/TRAINING PROGRAM
Payer: COMMERCIAL

## 2023-01-26 DIAGNOSIS — E55.9 VITAMIN D DEFICIENCY: ICD-10-CM

## 2023-01-26 DIAGNOSIS — E78.2 MIXED HYPERLIPIDEMIA: ICD-10-CM

## 2023-01-26 DIAGNOSIS — R73.01 ELEVATED FASTING GLUCOSE: ICD-10-CM

## 2023-01-26 DIAGNOSIS — Z76.89 ENCOUNTER TO ESTABLISH CARE: ICD-10-CM

## 2023-01-26 LAB
25(OH)D3 SERPL-MCNC: 66 NG/ML (ref 30–100)
ALBUMIN SERPL BCP-MCNC: 4.4 G/DL (ref 3.2–4.9)
ALBUMIN/GLOB SERPL: 1.4 G/DL
ALP SERPL-CCNC: 58 U/L (ref 30–99)
ALT SERPL-CCNC: 19 U/L (ref 2–50)
ANION GAP SERPL CALC-SCNC: 14 MMOL/L (ref 7–16)
AST SERPL-CCNC: 19 U/L (ref 12–45)
BILIRUB SERPL-MCNC: 0.9 MG/DL (ref 0.1–1.5)
BUN SERPL-MCNC: 15 MG/DL (ref 8–22)
CALCIUM ALBUM COR SERPL-MCNC: 9.3 MG/DL (ref 8.5–10.5)
CALCIUM SERPL-MCNC: 9.6 MG/DL (ref 8.5–10.5)
CHLORIDE SERPL-SCNC: 103 MMOL/L (ref 96–112)
CHOLEST SERPL-MCNC: 225 MG/DL (ref 100–199)
CO2 SERPL-SCNC: 22 MMOL/L (ref 20–33)
CREAT SERPL-MCNC: 0.87 MG/DL (ref 0.5–1.4)
ERYTHROCYTE [DISTWIDTH] IN BLOOD BY AUTOMATED COUNT: 43.5 FL (ref 35.9–50)
EST. AVERAGE GLUCOSE BLD GHB EST-MCNC: 114 MG/DL
FASTING STATUS PATIENT QL REPORTED: NORMAL
GFR SERPLBLD CREATININE-BSD FMLA CKD-EPI: 79 ML/MIN/1.73 M 2
GLOBULIN SER CALC-MCNC: 3.2 G/DL (ref 1.9–3.5)
GLUCOSE SERPL-MCNC: 96 MG/DL (ref 65–99)
HBA1C MFR BLD: 5.6 % (ref 4–5.6)
HBV SURFACE AB SERPL IA-ACNC: 613 MIU/ML (ref 0–10)
HCT VFR BLD AUTO: 43.9 % (ref 37–47)
HDLC SERPL-MCNC: 52 MG/DL
HGB BLD-MCNC: 14.7 G/DL (ref 12–16)
LDLC SERPL CALC-MCNC: 159 MG/DL
MCH RBC QN AUTO: 31.1 PG (ref 27–33)
MCHC RBC AUTO-ENTMCNC: 33.5 G/DL (ref 33.6–35)
MCV RBC AUTO: 92.8 FL (ref 81.4–97.8)
PLATELET # BLD AUTO: 250 K/UL (ref 164–446)
PMV BLD AUTO: 12 FL (ref 9–12.9)
POTASSIUM SERPL-SCNC: 4.3 MMOL/L (ref 3.6–5.5)
PROT SERPL-MCNC: 7.6 G/DL (ref 6–8.2)
RBC # BLD AUTO: 4.73 M/UL (ref 4.2–5.4)
SODIUM SERPL-SCNC: 139 MMOL/L (ref 135–145)
T4 FREE SERPL-MCNC: 1.62 NG/DL (ref 0.93–1.7)
TRIGL SERPL-MCNC: 68 MG/DL (ref 0–149)
TSH SERPL DL<=0.005 MIU/L-ACNC: 5.08 UIU/ML (ref 0.38–5.33)
WBC # BLD AUTO: 5.4 K/UL (ref 4.8–10.8)

## 2023-01-26 PROCEDURE — 80053 COMPREHEN METABOLIC PANEL: CPT

## 2023-01-26 PROCEDURE — 80061 LIPID PANEL: CPT

## 2023-01-26 PROCEDURE — 86706 HEP B SURFACE ANTIBODY: CPT

## 2023-01-26 PROCEDURE — 36415 COLL VENOUS BLD VENIPUNCTURE: CPT

## 2023-01-26 PROCEDURE — 83036 HEMOGLOBIN GLYCOSYLATED A1C: CPT

## 2023-01-26 PROCEDURE — 84439 ASSAY OF FREE THYROXINE: CPT

## 2023-01-26 PROCEDURE — 85027 COMPLETE CBC AUTOMATED: CPT

## 2023-01-26 PROCEDURE — 84443 ASSAY THYROID STIM HORMONE: CPT

## 2023-01-26 PROCEDURE — 82306 VITAMIN D 25 HYDROXY: CPT

## 2023-02-16 ENCOUNTER — HOSPITAL ENCOUNTER (OUTPATIENT)
Facility: MEDICAL CENTER | Age: 58
End: 2023-02-16
Attending: STUDENT IN AN ORGANIZED HEALTH CARE EDUCATION/TRAINING PROGRAM
Payer: COMMERCIAL

## 2023-02-16 ENCOUNTER — OFFICE VISIT (OUTPATIENT)
Dept: MEDICAL GROUP | Facility: PHYSICIAN GROUP | Age: 58
End: 2023-02-16
Payer: COMMERCIAL

## 2023-02-16 VITALS
HEIGHT: 65 IN | OXYGEN SATURATION: 95 % | WEIGHT: 210 LBS | BODY MASS INDEX: 34.99 KG/M2 | TEMPERATURE: 97.4 F | SYSTOLIC BLOOD PRESSURE: 110 MMHG | HEART RATE: 73 BPM | DIASTOLIC BLOOD PRESSURE: 64 MMHG | RESPIRATION RATE: 15 BRPM

## 2023-02-16 DIAGNOSIS — E78.2 MIXED HYPERLIPIDEMIA: ICD-10-CM

## 2023-02-16 DIAGNOSIS — E03.9 ACQUIRED HYPOTHYROIDISM: ICD-10-CM

## 2023-02-16 DIAGNOSIS — Z01.419 WELL WOMAN EXAM: ICD-10-CM

## 2023-02-16 PROCEDURE — 99396 PREV VISIT EST AGE 40-64: CPT | Performed by: STUDENT IN AN ORGANIZED HEALTH CARE EDUCATION/TRAINING PROGRAM

## 2023-02-16 PROCEDURE — 88175 CYTOPATH C/V AUTO FLUID REDO: CPT

## 2023-02-16 PROCEDURE — 87624 HPV HI-RISK TYP POOLED RSLT: CPT

## 2023-02-16 ASSESSMENT — FIBROSIS 4 INDEX: FIB4 SCORE: 0.96

## 2023-02-16 NOTE — PROGRESS NOTES
Subjective:     CC:   Chief Complaint   Patient presents with    Gynecologic Exam     Pap without breast exam       HPI:   Ruby Sue is a 57 y.o. female who presents for annual exam. She is feeling well and denies any complaints.    OBGYN/ History:    Patient has GYN provider: no  /Para:    Last Pap Smear:  18, denies history of abnormal pap smears.  Gyn Surgery:  tubal ligation.  Current Contraceptive Method:  tubal ligation, currently sexually active with .  Last menstrual period:  .  Post-menopausal bleeding: denies  Urinary incontinence: denies    Health Maintenance  Cholesterol Screenin23  and , ASCVD risk 1.5%  Diabetes Screenin23 A1c 5.6  Diet / Exercise: BMI 34.95, recently started workout program with   Smoking: former smoker  Substance Abuse: occasional alcohol use, denies illicit drug use   Safe in relationship: yes,    Dentist: no issues, doesn't have dental insurance  Ophthalmology: wears glasses, follows up regularly    Cancer screening  Colorectal Cancer Screening: colonoscopy 18, repeat in 10 yrs  Cervical Cancer Screening: done today  Breast Cancer Screening: mammogram 23    Infectious disease screening/Immunizations  --Hepatitis C Screening: negative in   --Immunizations:    Influenza: UTD   Tetanus: UTD   Shingles: UTD   Pneumococcal : UTD     Hepatitis B: UTD      She  has a past medical history of ASTHMA, Backpain, Depression, Hypothyroid (2009), Hypothyroid, Psychiatric problem, Thoracic vertebral fracture (HCC) (), Thyroid disease (hyper s/p radioactive), and Vitamin D deficiency (2021).    She has no past medical history of Angina, ASTHMA, Bronchitis, Dialysis, Fall, Heart valve disease, Indigestion, Infectious disease, Jaundice, Other specified symptom associated with female genital organs, Pacemaker, Personal history of venous thrombosis and embolism, Pneumonia, Rheumatic fever,  Unspecified hemorrhagic conditions, or Unspecified urinary incontinence.  She  has a past surgical history that includes tonsillectomy; tubal coagulation laparoscopic bilateral; and pr breast reduction.    Family History   Problem Relation Age of Onset    Diabetes Mother     Heart Disease Mother         MI in 60's    Stroke Father         TIA    Cancer Father         skin, non-melanoma    Cancer Sister         skin, melanoma    No Known Problems Brother     Heart Disease Maternal Grandmother     Stroke Maternal Grandmother     Asthma Maternal Grandfather     Cancer Paternal Grandmother         lung, former smoker    No Known Problems Daughter     Asthma Daughter     No Known Problems Daughter     No Known Problems Daughter     Breast Cancer Neg Hx     Colorectal Cancer Neg Hx     Peritoneal Cancer Neg Hx     Tubal Cancer Neg Hx     Ovarian Cancer Neg Hx        Social History     Socioeconomic History    Marital status:      Spouse name: Not on file    Number of children: 4    Years of education: Not on file    Highest education level: 12th grade   Occupational History    Occupation: [employer]   Tobacco Use    Smoking status: Former     Types: Cigarettes    Smokeless tobacco: Never    Tobacco comments:     Smoked on/off for 10-15 years, 1/2-1 ppd   Vaping Use    Vaping Use: Never used   Substance and Sexual Activity    Alcohol use: Yes     Alcohol/week: 1.2 oz     Types: 2 Glasses of wine per week    Drug use: No    Sexual activity: Not Currently     Partners: Male     Birth control/protection: Female Sterilization     Comment:    Other Topics Concern    Not on file   Social History Narrative    4 children. Previously worked at [employer] now at [employer] office.      Social Determinants of Health     Financial Resource Strain: Low Risk     Difficulty of Paying Living Expenses: Not hard at all   Food Insecurity: No Food Insecurity    Worried About Running Out of Food in the Last Year: Never true    Ran Out of  Food in the Last Year: Never true   Transportation Needs: No Transportation Needs    Lack of Transportation (Medical): No    Lack of Transportation (Non-Medical): No   Physical Activity: Inactive    Days of Exercise per Week: 0 days    Minutes of Exercise per Session: 0 min   Stress: No Stress Concern Present    Feeling of Stress : Only a little   Social Connections: Socially Isolated    Frequency of Communication with Friends and Family: Once a week    Frequency of Social Gatherings with Friends and Family: Once a week    Attends Zoroastrianism Services: Never    Active Member of Clubs or Organizations: No    Attends Club or Organization Meetings: Never    Marital Status:    Intimate Partner Violence: Not on file   Housing Stability: High Risk    Unable to Pay for Housing in the Last Year: No    Number of Places Lived in the Last Year: 5    Unstable Housing in the Last Year: No       Patient Active Problem List    Diagnosis Date Noted    Elevated fasting glucose 02/17/2022    Seasonal allergies 05/14/2019    MDD (major depressive disorder) 12/08/2017    Mixed hyperlipidemia 04/30/2017    Mild intermittent asthma without complication 04/30/2017    Acquired hypothyroidism 09/20/2009         Current Outpatient Medications   Medication Sig Dispense Refill    escitalopram (LEXAPRO) 20 MG tablet TAKE ONE TABLET BY MOUTH DAILY 90 Tablet 3    levothyroxine (SYNTHROID) 112 MCG Tab Take 1 Tablet by mouth every morning on an empty stomach. 90 Tablet 3    montelukast (SINGULAIR) 10 MG Tab Take 1 Tablet by mouth every day. 90 Tablet 3    albuterol (VENTOLIN HFA) 108 (90 Base) MCG/ACT Aero Soln inhalation aerosol Inhale 2 Puffs every four hours as needed for Shortness of Breath. 3 Each 3     No current facility-administered medications for this visit.     Allergies   Allergen Reactions    Cleocin [Clindamycin Hcl]     Pcn [Penicillins]        Review of Systems   Constitutional: Negative for fever, chills and malaise/fatigue.  "  HENT: Negative for congestion.    Eyes: Negative for pain or discharge.    Respiratory: Negative for cough and shortness of breath.  Cardiovascular: Negative for leg swelling.   Gastrointestinal: Negative for nausea, vomiting, abdominal pain and diarrhea.   Genitourinary: Negative for dysuria and hematuria.   Skin: Negative for rash.   Neurological: Negative for dizziness, focal weakness and headaches.   Endo/Heme/Allergies: Does not bleed easily.   Psychiatric/Behavioral: Negative for depression, anxiety, and suicidal thoughts.    Objective:     /64   Pulse 73   Temp 36.3 °C (97.4 °F) (Temporal)   Resp 15   Ht 1.651 m (5' 5\")   Wt 95.3 kg (210 lb)   SpO2 95%   BMI 34.95 kg/m²   Body mass index is 34.95 kg/m².  Wt Readings from Last 4 Encounters:   02/16/23 95.3 kg (210 lb)   01/12/23 97.1 kg (214 lb)   12/01/22 94.3 kg (208 lb)   09/27/22 90.7 kg (200 lb)       Physical Exam:  Constitutional: Well-developed and well-nourished. No acute distress.   Skin: Skin is warm and dry. No rash noted.  Neck: Supple, trachea midline. Normal range of motion. No thyromegaly present. No lymphadenopathy--cervical or supraclavicular.  Cardiovascular: Regular rate and rhythm, S1 and S2 without murmur, rubs, or gallops.  Lungs: Normal inspiratory effort, CTA bilaterally, no wheezes/rhonchi/rales  Breast: Patient declined breast exam.  Abdomen: Soft, non tender, and without distention. Active bowel sounds.  : Perineum and external genitalia normal without rash. Vagina with normal and physiologic discharge. Cervix without visible lesions or discharge. Bimanual exam without adnexal masses or cervical motion tenderness.  Extremities: No cyanosis, clubbing, erythema, or edema. Distal pulses intact and symmetric.   Musculoskeletal: Normal ROM in all extremities.  Neurological: Alert and oriented x 3. No acute focal deficits.   Psychiatric:  Behavior, mood, and affect are appropriate.    A chaperone was offered to the " patient during today's exam. Chaperone name: Cesia was present.    Hospital Outpatient Visit on 01/26/2023   Component Date Value Ref Range Status    Hep B Surface Antibody Quant 01/26/2023 613.00 (H)  0.00 - 10.00 mIU/mL Final    Comment: Anti HBs concentration detected at > 10 mIU/mL. Individual is considered to  be immune to infection with HBV.  Reference Interval: anti-HBs  < 8.5 mIU/mL..........Negative.  8.5 - 11.4 mIU/mL..........Indeterminate.  = 11.5 mIU/mL..........Positive.  Assay performance characteristics have not been established when the Elecsys  Anti HBs assay is used in conjunction with other manufacturers' assays for  specific HBV serological markers.  For post-vaccination antibody testing guidelines for the general public,  refer to MMWR December 23, 2005/Vol. 54 (No.16);1-23, and for healthcare  workers, refer to MMWR December 20, 2013/Vol.62(No. 10);1-19.      25-Hydroxy   Vitamin D 25 01/26/2023 66  30 - 100 ng/mL Final    Comment: Adult Ranges:   <20 ng/mL - Deficiency  20-29 ng/mL - Insufficiency   ng/mL - Sufficiency  Electrochemiluminescence binding assay performed using Roche leanne e  immunoassay analyzer.  The Elecsys Vitamin D total II assay is intended for  the quantitative determination of total 25 hydroxyvitamin D in human serum  and plasma. This assay is to be used as an aid in the assessment of vitamin  D sufficiency in adults.      Cholesterol,Tot 01/26/2023 225 (H)  100 - 199 mg/dL Final    Triglycerides 01/26/2023 68  0 - 149 mg/dL Final    HDL 01/26/2023 52  >=40 mg/dL Final    LDL 01/26/2023 159 (H)  <100 mg/dL Final    Glycohemoglobin 01/26/2023 5.6  4.0 - 5.6 % Final    Comment: Increased risk for diabetes:  5.7 -6.4%  Diabetes:  >6.4%  Glycemic control for adults with diabetes:  <7.0%    The above interpretations are per ADA guidelines.  Diagnosis  of diabetes mellitus on the basis of elevated Hemoglobin A1c  should be confirmed by repeating the Hb A1c test.      Est  Avg Glucose 01/26/2023 114  mg/dL Final    Comment: The eAG calculation is based on the A1c-Derived Daily Glucose  (ADAG) study.  See the ADA's website for additional information.      Sodium 01/26/2023 139  135 - 145 mmol/L Final    Potassium 01/26/2023 4.3  3.6 - 5.5 mmol/L Final    Chloride 01/26/2023 103  96 - 112 mmol/L Final    Co2 01/26/2023 22  20 - 33 mmol/L Final    Anion Gap 01/26/2023 14.0  7.0 - 16.0 Final    Glucose 01/26/2023 96  65 - 99 mg/dL Final    Bun 01/26/2023 15  8 - 22 mg/dL Final    Creatinine 01/26/2023 0.87  0.50 - 1.40 mg/dL Final    Calcium 01/26/2023 9.6  8.5 - 10.5 mg/dL Final    AST(SGOT) 01/26/2023 19  12 - 45 U/L Final    ALT(SGPT) 01/26/2023 19  2 - 50 U/L Final    Alkaline Phosphatase 01/26/2023 58  30 - 99 U/L Final    Total Bilirubin 01/26/2023 0.9  0.1 - 1.5 mg/dL Final    Albumin 01/26/2023 4.4  3.2 - 4.9 g/dL Final    Total Protein 01/26/2023 7.6  6.0 - 8.2 g/dL Final    Globulin 01/26/2023 3.2  1.9 - 3.5 g/dL Final    A-G Ratio 01/26/2023 1.4  g/dL Final    WBC 01/26/2023 5.4  4.8 - 10.8 K/uL Final    RBC 01/26/2023 4.73  4.20 - 5.40 M/uL Final    Hemoglobin 01/26/2023 14.7  12.0 - 16.0 g/dL Final    Hematocrit 01/26/2023 43.9  37.0 - 47.0 % Final    MCV 01/26/2023 92.8  81.4 - 97.8 fL Final    MCH 01/26/2023 31.1  27.0 - 33.0 pg Final    MCHC 01/26/2023 33.5 (L)  33.6 - 35.0 g/dL Final    RDW 01/26/2023 43.5  35.9 - 50.0 fL Final    Platelet Count 01/26/2023 250  164 - 446 K/uL Final    MPV 01/26/2023 12.0  9.0 - 12.9 fL Final    Fasting Status 01/26/2023 Fasting   Final    TSH 01/26/2023 5.080  0.380 - 5.330 uIU/mL Final    Comment: The 2011 American Thyroid Association (MARTIN) guidelines  recommended that the interpretation of thyroid function in  pregnancy be based on trimester specific reference ranges.    1st Trimester  0.100-2.500 mIU/L  2nd Trimester  0.200-3.000 mIU/L  3rd Trimester  0.300-3.500 mIU/L    These established reference ranges have not been validated  at  [facility].      Free T-4 01/26/2023 1.62  0.93 - 1.70 ng/dL Final    Correct Calcium 01/26/2023 9.3  8.5 - 10.5 mg/dL Final    GFR (CKD-EPI) 01/26/2023 79  >60 mL/min/1.73 m 2 Final    Comment: Estimated Glomerular Filtration Rate is calculated using  race neutral CKD-EPI 2021 equation per NKF-ASN recommendations.           Assessment and Plan:     1. Well woman exam  Annual preventive exam with pap smear done today.  Patient declined breast exam.  UTD with colonoscopy, mammogram, and vaccines.  Vitamin D level was normal and vitamin D 50,000 units weekly was discontinued.  Patient was advised to start vitamin D 1000 units daily and calcium supplementation for osteoporosis prevention.  - THINPREP PAP WITH HPV; Future    2. Mixed hyperlipidemia  Chronic, uncontrolled.  1/26/23  and .  ASCVD risk 1.5%.  Risk was discussed with the patient and medication not indicated at this time.  Continue to monitor.  Encouraged healthy diet and regular exercise.    3. Acquired hypothyroidism  Chronic, controlled.  TSH and T4 WNL.  Continue levothyroxine 112 mcg daily.      Anticipatory guidance  --Discussed moderation in sodium/caffeine intake, saturated fat and cholesterol, caloric balance, sufficient fresh fruits/vegetables, fiber.  --Discussed brushing, flossing, and dental visits.   --Encouraged 150 minutes of exercise weekly.   --Discussed tobacco, alcohol, and other drug use.   --Discussed calcium + Vit D supplement.  --Discussed safety belts, safety helmets, smoke detector, gun safety, etc.  --Discussed sun protection with minimum of spf 30.      Follow-up: Return in about 1 year (around 2/16/2024) for Well visit.

## 2023-05-02 ENCOUNTER — HOSPITAL ENCOUNTER (OUTPATIENT)
Dept: RADIOLOGY | Facility: MEDICAL CENTER | Age: 58
End: 2023-05-02
Attending: STUDENT IN AN ORGANIZED HEALTH CARE EDUCATION/TRAINING PROGRAM
Payer: COMMERCIAL

## 2023-05-02 ENCOUNTER — OFFICE VISIT (OUTPATIENT)
Dept: MEDICAL GROUP | Facility: PHYSICIAN GROUP | Age: 58
End: 2023-05-02
Payer: COMMERCIAL

## 2023-05-02 VITALS
HEIGHT: 66 IN | TEMPERATURE: 98.2 F | RESPIRATION RATE: 16 BRPM | HEART RATE: 75 BPM | WEIGHT: 208 LBS | DIASTOLIC BLOOD PRESSURE: 62 MMHG | OXYGEN SATURATION: 98 % | BODY MASS INDEX: 33.43 KG/M2 | SYSTOLIC BLOOD PRESSURE: 100 MMHG

## 2023-05-02 DIAGNOSIS — M25.562 BILATERAL CHRONIC KNEE PAIN: ICD-10-CM

## 2023-05-02 DIAGNOSIS — M25.561 BILATERAL CHRONIC KNEE PAIN: ICD-10-CM

## 2023-05-02 DIAGNOSIS — G89.29 BILATERAL CHRONIC KNEE PAIN: ICD-10-CM

## 2023-05-02 PROCEDURE — 73565 X-RAY EXAM OF KNEES: CPT

## 2023-05-02 PROCEDURE — 99213 OFFICE O/P EST LOW 20 MIN: CPT | Performed by: STUDENT IN AN ORGANIZED HEALTH CARE EDUCATION/TRAINING PROGRAM

## 2023-05-02 ASSESSMENT — FIBROSIS 4 INDEX: FIB4 SCORE: 0.96

## 2023-05-02 NOTE — PROGRESS NOTES
"Subjective:     Chief Complaint   Patient presents with    Knee Pain     B/L         HPI:   Ruby presents today with knee pain since her 20s but has been progressively worsening over the past few years.  Patient reports difficulty with kneeling or squatting down.  Patient describes the pain as burning, achy sensation that is worse with any flexion and 3-4/10 in severity.  Patient has noticed clicking when climbing stairs and has occasional locking but no falls.  Patient denies redness and swelling but reports occasional stiffness in the evening      Health Maintenance: Completed    ROS:  Negative except as stated above.    Objective:     Exam:  /62 (BP Location: Left arm, Patient Position: Sitting, BP Cuff Size: Adult)   Pulse 75   Temp 36.8 °C (98.2 °F) (Temporal)   Resp 16   Ht 1.676 m (5' 6\")   Wt 94.3 kg (208 lb)   SpO2 98%   BMI 33.57 kg/m²  Body mass index is 33.57 kg/m².    Physical Exam    Gen: Alert and oriented, no acute distress.  Lungs: Normal effort, CTAB, no wheezing / rhonchi / rales.  CV: RRR, normal S1 and S2, no murmurs.  MSK:   Full ROM in knees bilaterally with no tenderness to palpation.    Assessment & Plan:     57 y.o. female with the following -     1. Bilateral chronic knee pain  Chronic, uncontrolled.  Possibly OA.  XR ordered for further evaluation.  Consider PT or Ortho referral.  - DX-KNEES-AP BILATERAL STANDING; Future          Return in about 4 weeks (around 5/30/2023) for Discuss imaging.    Please note that this dictation was created using voice recognition software. I have made every reasonable attempt to correct obvious errors, but I expect that there are errors of grammar and possibly content that I did not discover before finalizing the note.        "

## 2023-05-04 ENCOUNTER — PATIENT MESSAGE (OUTPATIENT)
Dept: MEDICAL GROUP | Facility: PHYSICIAN GROUP | Age: 58
End: 2023-05-04
Payer: COMMERCIAL

## 2023-05-04 DIAGNOSIS — G89.29 BILATERAL CHRONIC KNEE PAIN: ICD-10-CM

## 2023-05-04 DIAGNOSIS — M25.561 BILATERAL CHRONIC KNEE PAIN: ICD-10-CM

## 2023-05-04 DIAGNOSIS — M25.562 BILATERAL CHRONIC KNEE PAIN: ICD-10-CM

## 2023-06-07 DIAGNOSIS — M25.562 BILATERAL CHRONIC KNEE PAIN: ICD-10-CM

## 2023-06-07 DIAGNOSIS — G89.29 BILATERAL CHRONIC KNEE PAIN: ICD-10-CM

## 2023-06-07 DIAGNOSIS — M25.561 BILATERAL CHRONIC KNEE PAIN: ICD-10-CM

## 2023-06-29 ENCOUNTER — APPOINTMENT (OUTPATIENT)
Dept: PHYSICAL THERAPY | Facility: REHABILITATION | Age: 58
End: 2023-06-29
Attending: STUDENT IN AN ORGANIZED HEALTH CARE EDUCATION/TRAINING PROGRAM
Payer: COMMERCIAL

## 2023-07-03 ENCOUNTER — APPOINTMENT (OUTPATIENT)
Dept: PHYSICAL THERAPY | Facility: REHABILITATION | Age: 58
End: 2023-07-03
Attending: STUDENT IN AN ORGANIZED HEALTH CARE EDUCATION/TRAINING PROGRAM
Payer: COMMERCIAL

## 2023-07-06 ENCOUNTER — APPOINTMENT (OUTPATIENT)
Dept: PHYSICAL THERAPY | Facility: REHABILITATION | Age: 58
End: 2023-07-06
Attending: STUDENT IN AN ORGANIZED HEALTH CARE EDUCATION/TRAINING PROGRAM
Payer: COMMERCIAL

## 2023-07-10 ENCOUNTER — APPOINTMENT (OUTPATIENT)
Dept: PHYSICAL THERAPY | Facility: REHABILITATION | Age: 58
End: 2023-07-10
Attending: STUDENT IN AN ORGANIZED HEALTH CARE EDUCATION/TRAINING PROGRAM
Payer: COMMERCIAL

## 2023-07-13 ENCOUNTER — APPOINTMENT (OUTPATIENT)
Dept: PHYSICAL THERAPY | Facility: REHABILITATION | Age: 58
End: 2023-07-13
Attending: STUDENT IN AN ORGANIZED HEALTH CARE EDUCATION/TRAINING PROGRAM
Payer: COMMERCIAL

## 2023-07-17 ENCOUNTER — OFFICE VISIT (OUTPATIENT)
Dept: MEDICAL GROUP | Facility: PHYSICIAN GROUP | Age: 58
End: 2023-07-17
Payer: COMMERCIAL

## 2023-07-17 ENCOUNTER — APPOINTMENT (OUTPATIENT)
Dept: PHYSICAL THERAPY | Facility: REHABILITATION | Age: 58
End: 2023-07-17
Attending: STUDENT IN AN ORGANIZED HEALTH CARE EDUCATION/TRAINING PROGRAM
Payer: COMMERCIAL

## 2023-07-17 VITALS
TEMPERATURE: 98.8 F | DIASTOLIC BLOOD PRESSURE: 72 MMHG | SYSTOLIC BLOOD PRESSURE: 124 MMHG | HEART RATE: 76 BPM | WEIGHT: 204 LBS | HEIGHT: 66 IN | OXYGEN SATURATION: 97 % | BODY MASS INDEX: 32.78 KG/M2

## 2023-07-17 DIAGNOSIS — M25.562 BILATERAL CHRONIC KNEE PAIN: ICD-10-CM

## 2023-07-17 DIAGNOSIS — G89.29 BILATERAL CHRONIC KNEE PAIN: ICD-10-CM

## 2023-07-17 DIAGNOSIS — M25.561 BILATERAL CHRONIC KNEE PAIN: ICD-10-CM

## 2023-07-17 DIAGNOSIS — R21 RASH: ICD-10-CM

## 2023-07-17 PROCEDURE — 99213 OFFICE O/P EST LOW 20 MIN: CPT | Performed by: STUDENT IN AN ORGANIZED HEALTH CARE EDUCATION/TRAINING PROGRAM

## 2023-07-17 PROCEDURE — 3078F DIAST BP <80 MM HG: CPT | Performed by: STUDENT IN AN ORGANIZED HEALTH CARE EDUCATION/TRAINING PROGRAM

## 2023-07-17 PROCEDURE — 3074F SYST BP LT 130 MM HG: CPT | Performed by: STUDENT IN AN ORGANIZED HEALTH CARE EDUCATION/TRAINING PROGRAM

## 2023-07-17 RX ORDER — TRIAMCINOLONE ACETONIDE 1 MG/G
1 CREAM TOPICAL 2 TIMES DAILY
Qty: 15 G | Refills: 0 | Status: SHIPPED | OUTPATIENT
Start: 2023-07-17 | End: 2023-11-01

## 2023-07-17 ASSESSMENT — FIBROSIS 4 INDEX: FIB4 SCORE: 0.96

## 2023-07-17 NOTE — PROGRESS NOTES
"Subjective:     Chief Complaint   Patient presents with    Skin Ulcer     Small little bump in chest          HPI:   Ruby presents today with    Rash  Patient complains of itchy rash on her left upper chest wall that she first noticed 3-4 weeks ago and is unsure if it has changed.  Patient reports it is red and only gets itchy if she touches the area or it rubs against something.  Patient tried cortisone once but no improvement.  FH of skin cancer in grandfather, father, sister.  Patient denies recent travel or hiking.    Bilateral chronic knee pain  Patient recently started PT twice weekly and has completed 3 sessions.      Health Maintenance: Completed    ROS:  Negative except as stated above.    Objective:     Exam:  /72 (BP Location: Left arm, Patient Position: Sitting, BP Cuff Size: Adult)   Pulse 76   Temp 37.1 °C (98.8 °F) (Temporal)   Ht 1.676 m (5' 6\")   Wt 92.5 kg (204 lb)   SpO2 97%   BMI 32.93 kg/m²  Body mass index is 32.93 kg/m².    Physical Exam    Gen: Alert and oriented, no acute distress.  Lungs: Normal effort, CTAB, no wheezing / rhonchi / rales.  CV: RRR, normal S1 and S2, no murmurs.  Skin:    1 cm erythematous patch on left upper chest wall with irregular borders and slight flaking.    Assessment & Plan:     57 y.o. female with the following -     1. Rash  Acute.  Onset 3-4 weeks ago.  Prescribed triamcinolone cream twice daily.  If no improvement give referral to dermatology to rule out cancer.  - triamcinolone acetonide (KENALOG) 0.1 % Cream; Apply 1 Application topically 2 times a day.  Dispense: 15 g; Refill: 0    2. Bilateral chronic knee pain  Chronic.  Continue PT.  If no improvement consider Ortho referral.          Return in about 7 months (around 2/17/2024) for Annual preventive visit.    Please note that this dictation was created using voice recognition software. I have made every reasonable attempt to correct obvious errors, but I expect that there are errors of " grammar and possibly content that I did not discover before finalizing the note.

## 2023-07-20 ENCOUNTER — APPOINTMENT (OUTPATIENT)
Dept: PHYSICAL THERAPY | Facility: REHABILITATION | Age: 58
End: 2023-07-20
Attending: STUDENT IN AN ORGANIZED HEALTH CARE EDUCATION/TRAINING PROGRAM
Payer: COMMERCIAL

## 2023-07-24 ENCOUNTER — APPOINTMENT (OUTPATIENT)
Dept: PHYSICAL THERAPY | Facility: REHABILITATION | Age: 58
End: 2023-07-24
Attending: STUDENT IN AN ORGANIZED HEALTH CARE EDUCATION/TRAINING PROGRAM
Payer: COMMERCIAL

## 2023-07-27 ENCOUNTER — APPOINTMENT (OUTPATIENT)
Dept: PHYSICAL THERAPY | Facility: REHABILITATION | Age: 58
End: 2023-07-27
Attending: STUDENT IN AN ORGANIZED HEALTH CARE EDUCATION/TRAINING PROGRAM
Payer: COMMERCIAL

## 2023-07-27 DIAGNOSIS — R21 RASH AND NONSPECIFIC SKIN ERUPTION: ICD-10-CM

## 2023-07-27 NOTE — PROGRESS NOTES
Patient requested referral to dermatology for rash on chest that has not resolved with triamcinolone cream.

## 2023-09-04 ENCOUNTER — OFFICE VISIT (OUTPATIENT)
Dept: URGENT CARE | Facility: PHYSICIAN GROUP | Age: 58
End: 2023-09-04
Payer: COMMERCIAL

## 2023-09-04 VITALS
OXYGEN SATURATION: 98 % | HEIGHT: 66 IN | BODY MASS INDEX: 34.55 KG/M2 | TEMPERATURE: 97.1 F | HEART RATE: 85 BPM | DIASTOLIC BLOOD PRESSURE: 78 MMHG | RESPIRATION RATE: 12 BRPM | WEIGHT: 215 LBS | SYSTOLIC BLOOD PRESSURE: 122 MMHG

## 2023-09-04 DIAGNOSIS — S96.911A STRAIN OF RIGHT FOOT, INITIAL ENCOUNTER: ICD-10-CM

## 2023-09-04 PROCEDURE — 3078F DIAST BP <80 MM HG: CPT

## 2023-09-04 PROCEDURE — 3074F SYST BP LT 130 MM HG: CPT

## 2023-09-04 PROCEDURE — 99213 OFFICE O/P EST LOW 20 MIN: CPT

## 2023-09-04 RX ORDER — NAPROXEN 500 MG/1
500 TABLET ORAL 2 TIMES DAILY WITH MEALS
Qty: 14 TABLET | Refills: 0 | Status: SHIPPED | OUTPATIENT
Start: 2023-09-04 | End: 2023-09-11

## 2023-09-04 ASSESSMENT — FIBROSIS 4 INDEX: FIB4 SCORE: 0.96

## 2023-09-04 NOTE — PROGRESS NOTES
Subjective:   Ruby Sue is a 57 y.o. female who presents for Foot Injury (Hurt R foot x1 week ago, now having swelling and continued pain. Pain wraps around foot. )      HPI: This is a 57-year-old female who presents today for right foot pain.  This is a new problem.  Patient reports that she was hiking 1 week ago and slipped several times.  She denies any specific injury or trauma otherwise.  She has been ambulatory.  She reports pain to medial aspect of her right foot is 4\10.  She reports developing some swelling over her foot.  She has not take anything for her symptoms.  She denies pre-existing injuries to her right foot.  Denies numbness or tingling.    Review of Systems   Musculoskeletal:         + Right foot pain       Medications:    Current Outpatient Medications on File Prior to Visit   Medication Sig Dispense Refill    escitalopram (LEXAPRO) 20 MG tablet TAKE ONE TABLET BY MOUTH DAILY 90 Tablet 3    levothyroxine (SYNTHROID) 112 MCG Tab Take 1 Tablet by mouth every morning on an empty stomach. 90 Tablet 3    montelukast (SINGULAIR) 10 MG Tab Take 1 Tablet by mouth every day. 90 Tablet 3    triamcinolone acetonide (KENALOG) 0.1 % Cream Apply 1 Application topically 2 times a day. (Patient not taking: Reported on 9/4/2023) 15 g 0    albuterol (VENTOLIN HFA) 108 (90 Base) MCG/ACT Aero Soln inhalation aerosol Inhale 2 Puffs every four hours as needed for Shortness of Breath. (Patient not taking: Reported on 7/17/2023) 3 Each 3     No current facility-administered medications on file prior to visit.        Allergies:   Cleocin [clindamycin hcl] and Pcn [penicillins]    Problem List:   Patient Active Problem List   Diagnosis    Acquired hypothyroidism    Mixed hyperlipidemia    Mild intermittent asthma without complication    MDD (major depressive disorder)    Seasonal allergies    Elevated fasting glucose    Bilateral chronic knee pain        Surgical History:  Past Surgical History:   Procedure  "Laterality Date    SD BREAST REDUCTION      TONSILLECTOMY      TUBAL COAGULATION LAPAROSCOPIC BILATERAL         Past Social Hx:   Social History     Tobacco Use    Smoking status: Former     Types: Cigarettes    Smokeless tobacco: Never    Tobacco comments:     Smoked on/off for 10-15 years, 1/2-1 ppd   Vaping Use    Vaping Use: Never used   Substance Use Topics    Alcohol use: Yes     Alcohol/week: 1.2 oz     Types: 2 Glasses of wine per week    Drug use: No          Problem list, medications, and allergies reviewed by myself today in Epic.     Objective:     /78   Pulse 85   Temp 36.2 °C (97.1 °F)   Resp 12   Ht 1.676 m (5' 6\")   Wt 97.5 kg (215 lb)   SpO2 98%   BMI 34.70 kg/m²     Physical Exam  Vitals and nursing note reviewed.   Constitutional:       General: She is not in acute distress.     Appearance: Normal appearance. She is normal weight. She is not ill-appearing, toxic-appearing or diaphoretic.   HENT:      Head: Normocephalic and atraumatic.   Cardiovascular:      Rate and Rhythm: Normal rate and regular rhythm.      Pulses: Normal pulses.      Heart sounds: Normal heart sounds. No murmur heard.     No friction rub. No gallop.   Pulmonary:      Effort: Pulmonary effort is normal. No respiratory distress.      Breath sounds: Normal breath sounds. No stridor. No wheezing, rhonchi or rales.   Chest:      Chest wall: No tenderness.   Musculoskeletal:      Right ankle: Normal.        Feet:    Feet:      Comments: Right foot: No obvious abnormality, no erythema, no increased warmth.  Mild swelling and tenderness on palp over medial aspect of right foot.  Full range of motion.  Neurovascular intact.  Skin:     General: Skin is warm and dry.      Capillary Refill: Capillary refill takes less than 2 seconds.   Neurological:      General: No focal deficit present.      Mental Status: She is alert and oriented to person, place, and time. Mental status is at baseline.      Gait: Gait normal. "   Psychiatric:         Mood and Affect: Mood normal.         Behavior: Behavior normal.         Thought Content: Thought content normal.         Judgment: Judgment normal.         Assessment/Plan:     Diagnosis and associated orders:   1. Strain of right foot, initial encounter  naproxen (NAPROSYN) 500 MG Tab             Comments/MDM:   Pt is clinically stable at today's acute urgent care visit.  No acute distress noted. Appropriate for outpatient management at this time.     Acute problem.  Physical exam findings consistent with strain of right foot.  Discussed taking naproxen as prescribed, ice application, elevation, and rest.  She is return for any new or worsening signs or symptoms or symptoms of fail to improve.  Patient is agreeable this plan of care verbalizes good understanding.           Discussed DDx, management options (risks,benefits, and alternatives to planned treatment), natural progression and supportive care.  Expressed understanding and the treatment plan was agreed upon. Questions were encouraged and answered   Return to urgent care prn if new or worsening sx or if there is no improvement in condition prn.    Educated in Red flags and indications to immediately call 911 or present to the Emergency Department.   Advised the patient to follow-up with the primary care physician for recheck, reevaluation, and consideration of further management.    I personally reviewed prior external notes and test results pertinent to today's visit.  I have independently reviewed and interpreted all diagnostics ordered during this urgent care acute visit.     Please note that this dictation was created using voice recognition software. I have made a reasonable attempt to correct obvious errors, but I expect that there are errors of grammar and possibly content that I did not discover before finalizing the note.    This note was electronically signed by MOISES Chávez

## 2023-10-12 ENCOUNTER — APPOINTMENT (RX ONLY)
Dept: URBAN - METROPOLITAN AREA CLINIC 22 | Facility: CLINIC | Age: 58
Setting detail: DERMATOLOGY
End: 2023-10-12

## 2023-10-12 DIAGNOSIS — D18.0 HEMANGIOMA: ICD-10-CM

## 2023-10-12 DIAGNOSIS — D22 MELANOCYTIC NEVI: ICD-10-CM

## 2023-10-12 DIAGNOSIS — L82.1 OTHER SEBORRHEIC KERATOSIS: ICD-10-CM

## 2023-10-12 DIAGNOSIS — L72.0 EPIDERMAL CYST: ICD-10-CM

## 2023-10-12 DIAGNOSIS — L81.4 OTHER MELANIN HYPERPIGMENTATION: ICD-10-CM

## 2023-10-12 DIAGNOSIS — Z71.89 OTHER SPECIFIED COUNSELING: ICD-10-CM

## 2023-10-12 PROBLEM — D22.71 MELANOCYTIC NEVI OF RIGHT LOWER LIMB, INCLUDING HIP: Status: ACTIVE | Noted: 2023-10-12

## 2023-10-12 PROBLEM — D22.5 MELANOCYTIC NEVI OF TRUNK: Status: ACTIVE | Noted: 2023-10-12

## 2023-10-12 PROBLEM — D22.72 MELANOCYTIC NEVI OF LEFT LOWER LIMB, INCLUDING HIP: Status: ACTIVE | Noted: 2023-10-12

## 2023-10-12 PROBLEM — D18.01 HEMANGIOMA OF SKIN AND SUBCUTANEOUS TISSUE: Status: ACTIVE | Noted: 2023-10-12

## 2023-10-12 PROCEDURE — ? SUNSCREEN TREATMENT REGIMEN

## 2023-10-12 PROCEDURE — ? COUNSELING

## 2023-10-12 PROCEDURE — 99203 OFFICE O/P NEW LOW 30 MIN: CPT

## 2023-10-12 PROCEDURE — ? SUNSCREEN RECOMMENDATIONS

## 2023-10-12 ASSESSMENT — LOCATION DETAILED DESCRIPTION DERM
LOCATION DETAILED: RIGHT INFERIOR MEDIAL FOREHEAD
LOCATION DETAILED: RIGHT VENTRAL PROXIMAL FOREARM
LOCATION DETAILED: INFERIOR MID FOREHEAD
LOCATION DETAILED: LEFT SUPERIOR UPPER BACK
LOCATION DETAILED: LEFT PROXIMAL PRETIBIAL REGION
LOCATION DETAILED: SUPERIOR THORACIC SPINE
LOCATION DETAILED: RIGHT ANTERIOR PROXIMAL UPPER ARM
LOCATION DETAILED: INFERIOR THORACIC SPINE
LOCATION DETAILED: LEFT ANTERIOR PROXIMAL UPPER ARM
LOCATION DETAILED: RIGHT PROXIMAL PRETIBIAL REGION
LOCATION DETAILED: RIGHT SUPERIOR MEDIAL MIDBACK
LOCATION DETAILED: RIGHT SUPERIOR POSTAURICULAR SKIN
LOCATION DETAILED: LEFT VENTRAL PROXIMAL FOREARM
LOCATION DETAILED: EPIGASTRIC SKIN

## 2023-10-12 ASSESSMENT — LOCATION SIMPLE DESCRIPTION DERM
LOCATION SIMPLE: SCALP
LOCATION SIMPLE: LEFT FOREARM
LOCATION SIMPLE: LEFT PRETIBIAL REGION
LOCATION SIMPLE: LEFT UPPER BACK
LOCATION SIMPLE: INFERIOR FOREHEAD
LOCATION SIMPLE: RIGHT PRETIBIAL REGION
LOCATION SIMPLE: ABDOMEN
LOCATION SIMPLE: UPPER BACK
LOCATION SIMPLE: LEFT UPPER ARM
LOCATION SIMPLE: RIGHT FOREHEAD
LOCATION SIMPLE: RIGHT FOREARM
LOCATION SIMPLE: RIGHT UPPER ARM
LOCATION SIMPLE: RIGHT LOWER BACK

## 2023-10-12 ASSESSMENT — LOCATION ZONE DERM
LOCATION ZONE: ARM
LOCATION ZONE: LEG
LOCATION ZONE: SCALP
LOCATION ZONE: TRUNK
LOCATION ZONE: FACE

## 2023-10-12 NOTE — PROCEDURE: SUNSCREEN RECOMMENDATIONS

## 2023-11-01 ENCOUNTER — OFFICE VISIT (OUTPATIENT)
Dept: MEDICAL GROUP | Facility: PHYSICIAN GROUP | Age: 58
End: 2023-11-01
Payer: COMMERCIAL

## 2023-11-01 VITALS
DIASTOLIC BLOOD PRESSURE: 68 MMHG | OXYGEN SATURATION: 97 % | TEMPERATURE: 97.5 F | HEIGHT: 66 IN | WEIGHT: 215 LBS | BODY MASS INDEX: 34.55 KG/M2 | SYSTOLIC BLOOD PRESSURE: 108 MMHG | HEART RATE: 76 BPM

## 2023-11-01 DIAGNOSIS — E03.9 ACQUIRED HYPOTHYROIDISM: ICD-10-CM

## 2023-11-01 DIAGNOSIS — M25.561 BILATERAL CHRONIC KNEE PAIN: ICD-10-CM

## 2023-11-01 DIAGNOSIS — J30.2 SEASONAL ALLERGIES: ICD-10-CM

## 2023-11-01 DIAGNOSIS — E78.2 MIXED HYPERLIPIDEMIA: ICD-10-CM

## 2023-11-01 DIAGNOSIS — Z00.00 ROUTINE HEALTH MAINTENANCE: ICD-10-CM

## 2023-11-01 DIAGNOSIS — M25.562 BILATERAL CHRONIC KNEE PAIN: ICD-10-CM

## 2023-11-01 DIAGNOSIS — G89.29 BILATERAL CHRONIC KNEE PAIN: ICD-10-CM

## 2023-11-01 DIAGNOSIS — F33.0 MILD EPISODE OF RECURRENT MAJOR DEPRESSIVE DISORDER (HCC): ICD-10-CM

## 2023-11-01 PROCEDURE — 99214 OFFICE O/P EST MOD 30 MIN: CPT | Performed by: STUDENT IN AN ORGANIZED HEALTH CARE EDUCATION/TRAINING PROGRAM

## 2023-11-01 PROCEDURE — 3078F DIAST BP <80 MM HG: CPT | Performed by: STUDENT IN AN ORGANIZED HEALTH CARE EDUCATION/TRAINING PROGRAM

## 2023-11-01 PROCEDURE — 3074F SYST BP LT 130 MM HG: CPT | Performed by: STUDENT IN AN ORGANIZED HEALTH CARE EDUCATION/TRAINING PROGRAM

## 2023-11-01 RX ORDER — ESCITALOPRAM OXALATE 20 MG/1
TABLET ORAL
Qty: 90 TABLET | Refills: 3 | Status: SHIPPED | OUTPATIENT
Start: 2023-11-01

## 2023-11-01 RX ORDER — MONTELUKAST SODIUM 10 MG/1
10 TABLET ORAL DAILY
Qty: 90 TABLET | Refills: 3 | Status: SHIPPED | OUTPATIENT
Start: 2023-11-01

## 2023-11-01 RX ORDER — LEVOTHYROXINE SODIUM 112 UG/1
112 TABLET ORAL
Qty: 90 TABLET | Refills: 3 | Status: SHIPPED | OUTPATIENT
Start: 2023-11-01

## 2023-11-01 ASSESSMENT — FIBROSIS 4 INDEX: FIB4 SCORE: 0.96

## 2023-11-01 NOTE — ASSESSMENT & PLAN NOTE
Patient completed 12 sessions of physical therapy but continues to complain of knee pain.  Patient reports she predominantly has difficulty kneeling down or standing up from a kneeling position.  Patient reports she is unable to  something from the ground.  Patient reports she has some clicking in the right knee when walking up stairs.  May 2023 x-ray of knees was unremarkable.  Patient is requesting referral to Ortho.  Patient does not feel the need for medication.

## 2023-11-01 NOTE — PROGRESS NOTES
"Subjective:     Chief Complaint   Patient presents with    Referral Needed     Referral to ortho         HPI:   Ruby presents today with    Bilateral chronic knee pain  Patient completed 12 sessions of physical therapy but continues to complain of knee pain.  Patient reports she predominantly has difficulty kneeling down or standing up from a kneeling position.  Patient reports she is unable to  something from the ground.  Patient reports she has some clicking in the right knee when walking up stairs.  May 2023 x-ray of knees was unremarkable.  Patient is requesting referral to Ortho.  Patient does not feel the need for medication.      Health Maintenance: Completed    ROS:  Negative except as stated above.      Objective:     Exam:  /68 (BP Location: Left arm)   Pulse 76   Temp 36.4 °C (97.5 °F) (Temporal)   Ht 1.676 m (5' 6\")   Wt 97.5 kg (215 lb)   SpO2 97%   BMI 34.70 kg/m²  Body mass index is 34.7 kg/m².    Physical Exam    Gen: Alert and oriented, no acute distress.  Lungs: Normal effort, CTAB, no wheezing / rhonchi / rales.  CV: RRR, normal S1 and S2, no murmurs.      Assessment & Plan:     57 y.o. female with the following -     1. Routine health maintenance  - CBC WITHOUT DIFFERENTIAL; Future  - Comp Metabolic Panel; Future    2. Bilateral chronic knee pain  Chronic, uncontrolled.  X-rays were unremarkable.  No improvement with PT.  Patient requested referral to Ortho.  - Referral to Orthopedics    3. Acquired hypothyroidism  Chronic, controlled.  April 2023 TSH and T4 WNL.  Repeat labs ordered.  Continue levothyroxine 112 mcg daily.  - TSH WITH REFLEX TO FT4; Future  - levothyroxine (SYNTHROID) 112 MCG Tab; Take 1 Tablet by mouth every morning on an empty stomach.  Dispense: 90 Tablet; Refill: 3    4. Mixed hyperlipidemia  Chronic, uncontrolled.  January 2023  and  with normal TG and HDL.  ASCVD risk 2.2% and medication indicated.  Repeat lipid panel ordered.  - Lipid " Profile; Future    5. Mild episode of recurrent major depressive disorder (HCC)  Chronic, controlled.  Continue Lexapro 20 mg daily.  - escitalopram (LEXAPRO) 20 MG tablet; TAKE ONE TABLET BY MOUTH DAILY  Dispense: 90 Tablet; Refill: 3    6. Seasonal allergies  Chronic, controlled.  Continue singular 10 mg daily.  - montelukast (SINGULAIR) 10 MG Tab; Take 1 Tablet by mouth every day.  Dispense: 90 Tablet; Refill: 3          Return in about 5 months (around 4/1/2024) for Annual preventive visit, Discuss labs.    Please note that this dictation was created using voice recognition software. I have made every reasonable attempt to correct obvious errors, but I expect that there are errors of grammar and possibly content that I did not discover before finalizing the note.

## 2023-11-28 PROBLEM — M25.562 LEFT KNEE PAIN: Status: ACTIVE | Noted: 2023-11-28

## 2023-11-28 PROBLEM — M25.869 IMPINGEMENT SYNDROME INVOLVING PATELLAR FAT PAD: Status: ACTIVE | Noted: 2023-11-28

## 2023-12-29 DIAGNOSIS — E78.2 MIXED HYPERLIPIDEMIA: ICD-10-CM

## 2024-03-26 ENCOUNTER — OFFICE VISIT (OUTPATIENT)
Dept: MEDICAL GROUP | Facility: PHYSICIAN GROUP | Age: 59
End: 2024-03-26
Payer: COMMERCIAL

## 2024-03-26 VITALS
OXYGEN SATURATION: 94 % | SYSTOLIC BLOOD PRESSURE: 128 MMHG | HEIGHT: 66 IN | BODY MASS INDEX: 35.52 KG/M2 | TEMPERATURE: 96.9 F | HEART RATE: 84 BPM | WEIGHT: 221 LBS | DIASTOLIC BLOOD PRESSURE: 68 MMHG

## 2024-03-26 DIAGNOSIS — S09.91XA INJURY OF RIGHT EAR, INITIAL ENCOUNTER: ICD-10-CM

## 2024-03-26 DIAGNOSIS — N95.0 POSTMENOPAUSAL BLEEDING: ICD-10-CM

## 2024-03-26 PROBLEM — J45.20 MILD INTERMITTENT ASTHMA WITHOUT COMPLICATION: Chronic | Status: ACTIVE | Noted: 2017-04-30

## 2024-03-26 PROBLEM — E78.2 MIXED HYPERLIPIDEMIA: Chronic | Status: ACTIVE | Noted: 2017-04-30

## 2024-03-26 PROBLEM — F32.9 MDD (MAJOR DEPRESSIVE DISORDER): Chronic | Status: ACTIVE | Noted: 2017-12-08

## 2024-03-26 PROCEDURE — 3074F SYST BP LT 130 MM HG: CPT | Performed by: STUDENT IN AN ORGANIZED HEALTH CARE EDUCATION/TRAINING PROGRAM

## 2024-03-26 PROCEDURE — 99213 OFFICE O/P EST LOW 20 MIN: CPT | Performed by: STUDENT IN AN ORGANIZED HEALTH CARE EDUCATION/TRAINING PROGRAM

## 2024-03-26 PROCEDURE — 3078F DIAST BP <80 MM HG: CPT | Performed by: STUDENT IN AN ORGANIZED HEALTH CARE EDUCATION/TRAINING PROGRAM

## 2024-03-26 ASSESSMENT — FIBROSIS 4 INDEX: FIB4 SCORE: 0.98

## 2024-03-26 ASSESSMENT — PATIENT HEALTH QUESTIONNAIRE - PHQ9: CLINICAL INTERPRETATION OF PHQ2 SCORE: 0

## 2024-03-26 NOTE — PROGRESS NOTES
"Subjective:     Chief Complaint   Patient presents with    Vaginal Bleeding     13 bleeding after not having period 4 years     Ear Injury     Poked ear while cleaning Rear         HPI:   Ruby presents today with    Postmenopausal bleeding  LMP 4 years ago.  Patient reports one episode of bright red vaginal bleeding on 3/13 but she was experiencing diarrhea at the time.  February 2023 pap smear was negative for malignancy and HPV.  Jan 2023 TSH and T4 WNL.    Ear injury  Patient reports she was using a Q-tip and felt some discomfort in the ear.  Patient reports she also noticed some blood.  Patient complains of worsening humming and feeling of fullness in her ear and has upcoming appointment with audiology on 4/17 for acoustic neuroma evaluation.    Health Maintenance: Completed    ROS:  Negative except as stated above.      Objective:     Exam:  /68 (BP Location: Left arm, Patient Position: Sitting, BP Cuff Size: Adult)   Pulse 84   Temp 36.1 °C (96.9 °F) (Temporal)   Ht 1.676 m (5' 6\")   Wt 100 kg (221 lb)   SpO2 94%   BMI 35.67 kg/m²  Body mass index is 35.67 kg/m².    Physical Exam    Gen: Alert and oriented, no acute distress.  ENMT: TMs translucent with good landmarks bilaterally.  Dry blood in right ear canal.  Neck: Neck is supple, trachea middle, no palpable lymphadenopathy.  Lungs: Normal effort, CTAB, no wheezing / rhonchi / rales.  CV: RRR, normal S1 and S2, no murmurs.      Assessment & Plan:     58 y.o. female with the following -     1. Postmenopausal bleeding  This is a new problem.  LMP 4 years ago.  February 2023 pap smear was negative for malignancy and HPV.  Jan 2023 TSH and T4 WNL.  Ultrasound ordered for further evaluation and give referral to OBGYN after reviewing results.  - US-PELVIC COMPLETE (TRANSABDOMINAL/TRANSVAGINAL) (COMBO); Future    2. Injury of right ear, initial encounter  Acute.  Advised to avoid using Q-tips.          Return in about 29 days (around " 4/24/2024).    Please note that this dictation was created using voice recognition software. I have made every reasonable attempt to correct obvious errors, but I expect that there are errors of grammar and possibly content that I did not discover before finalizing the note.

## 2024-04-15 ENCOUNTER — APPOINTMENT (OUTPATIENT)
Dept: RADIOLOGY | Facility: MEDICAL CENTER | Age: 59
End: 2024-04-15
Attending: STUDENT IN AN ORGANIZED HEALTH CARE EDUCATION/TRAINING PROGRAM
Payer: COMMERCIAL

## 2024-04-15 DIAGNOSIS — N95.0 POSTMENOPAUSAL BLEEDING: ICD-10-CM

## 2024-04-15 PROCEDURE — 76830 TRANSVAGINAL US NON-OB: CPT

## 2024-04-19 ENCOUNTER — HOSPITAL ENCOUNTER (OUTPATIENT)
Dept: LAB | Facility: MEDICAL CENTER | Age: 59
End: 2024-04-19
Attending: STUDENT IN AN ORGANIZED HEALTH CARE EDUCATION/TRAINING PROGRAM
Payer: COMMERCIAL

## 2024-04-19 DIAGNOSIS — E03.9 ACQUIRED HYPOTHYROIDISM: ICD-10-CM

## 2024-04-19 DIAGNOSIS — Z00.00 ROUTINE HEALTH MAINTENANCE: ICD-10-CM

## 2024-04-19 DIAGNOSIS — E78.2 MIXED HYPERLIPIDEMIA: ICD-10-CM

## 2024-04-19 LAB
ALBUMIN SERPL BCP-MCNC: 4.5 G/DL (ref 3.2–4.9)
ALBUMIN/GLOB SERPL: 1.7 G/DL
ALP SERPL-CCNC: 58 U/L (ref 30–99)
ALT SERPL-CCNC: 16 U/L (ref 2–50)
ANION GAP SERPL CALC-SCNC: 11 MMOL/L (ref 7–16)
AST SERPL-CCNC: 22 U/L (ref 12–45)
BILIRUB SERPL-MCNC: 0.6 MG/DL (ref 0.1–1.5)
BUN SERPL-MCNC: 17 MG/DL (ref 8–22)
CALCIUM ALBUM COR SERPL-MCNC: 8.7 MG/DL (ref 8.5–10.5)
CALCIUM SERPL-MCNC: 9.1 MG/DL (ref 8.5–10.5)
CHLORIDE SERPL-SCNC: 105 MMOL/L (ref 96–112)
CHOLEST SERPL-MCNC: 246 MG/DL (ref 100–199)
CO2 SERPL-SCNC: 24 MMOL/L (ref 20–33)
CREAT SERPL-MCNC: 0.7 MG/DL (ref 0.5–1.4)
ERYTHROCYTE [DISTWIDTH] IN BLOOD BY AUTOMATED COUNT: 44.7 FL (ref 35.9–50)
FASTING STATUS PATIENT QL REPORTED: NORMAL
GFR SERPLBLD CREATININE-BSD FMLA CKD-EPI: 101 ML/MIN/1.73 M 2
GLOBULIN SER CALC-MCNC: 2.7 G/DL (ref 1.9–3.5)
GLUCOSE SERPL-MCNC: 108 MG/DL (ref 65–99)
HCT VFR BLD AUTO: 42.3 % (ref 37–47)
HDLC SERPL-MCNC: 58 MG/DL
HGB BLD-MCNC: 14.4 G/DL (ref 12–16)
LDLC SERPL CALC-MCNC: 174 MG/DL
MCH RBC QN AUTO: 31.1 PG (ref 27–33)
MCHC RBC AUTO-ENTMCNC: 34 G/DL (ref 32.2–35.5)
MCV RBC AUTO: 91.4 FL (ref 81.4–97.8)
PLATELET # BLD AUTO: 267 K/UL (ref 164–446)
PMV BLD AUTO: 11.3 FL (ref 9–12.9)
POTASSIUM SERPL-SCNC: 4.8 MMOL/L (ref 3.6–5.5)
PROT SERPL-MCNC: 7.2 G/DL (ref 6–8.2)
RBC # BLD AUTO: 4.63 M/UL (ref 4.2–5.4)
SODIUM SERPL-SCNC: 140 MMOL/L (ref 135–145)
T4 FREE SERPL-MCNC: 1.3 NG/DL (ref 0.93–1.7)
TRIGL SERPL-MCNC: 71 MG/DL (ref 0–149)
TSH SERPL DL<=0.005 MIU/L-ACNC: 5.78 UIU/ML (ref 0.38–5.33)
WBC # BLD AUTO: 4.8 K/UL (ref 4.8–10.8)

## 2024-04-19 PROCEDURE — 85027 COMPLETE CBC AUTOMATED: CPT

## 2024-04-19 PROCEDURE — 80053 COMPREHEN METABOLIC PANEL: CPT

## 2024-04-19 PROCEDURE — 36415 COLL VENOUS BLD VENIPUNCTURE: CPT

## 2024-04-19 PROCEDURE — 80061 LIPID PANEL: CPT

## 2024-04-19 PROCEDURE — 84439 ASSAY OF FREE THYROXINE: CPT

## 2024-04-19 PROCEDURE — 84443 ASSAY THYROID STIM HORMONE: CPT

## 2024-04-23 SDOH — ECONOMIC STABILITY: TRANSPORTATION INSECURITY

## 2024-04-23 SDOH — ECONOMIC STABILITY: HOUSING INSECURITY

## 2024-04-23 SDOH — HEALTH STABILITY: PHYSICAL HEALTH: ON AVERAGE, HOW MANY MINUTES DO YOU ENGAGE IN EXERCISE AT THIS LEVEL?: 40 MIN

## 2024-04-23 SDOH — HEALTH STABILITY: PHYSICAL HEALTH: ON AVERAGE, HOW MANY DAYS PER WEEK DO YOU ENGAGE IN MODERATE TO STRENUOUS EXERCISE (LIKE A BRISK WALK)?: 4 DAYS

## 2024-04-23 SDOH — HEALTH STABILITY: MENTAL HEALTH

## 2024-04-23 ASSESSMENT — SOCIAL DETERMINANTS OF HEALTH (SDOH)
DO YOU BELONG TO ANY CLUBS OR ORGANIZATIONS SUCH AS CHURCH GROUPS UNIONS, FRATERNAL OR ATHLETIC GROUPS, OR SCHOOL GROUPS?: NO
DO YOU BELONG TO ANY CLUBS OR ORGANIZATIONS SUCH AS CHURCH GROUPS UNIONS, FRATERNAL OR ATHLETIC GROUPS, OR SCHOOL GROUPS?: NO

## 2024-04-24 ENCOUNTER — OFFICE VISIT (OUTPATIENT)
Dept: MEDICAL GROUP | Facility: PHYSICIAN GROUP | Age: 59
End: 2024-04-24
Payer: COMMERCIAL

## 2024-04-24 VITALS
HEIGHT: 66 IN | BODY MASS INDEX: 35.17 KG/M2 | DIASTOLIC BLOOD PRESSURE: 64 MMHG | HEART RATE: 84 BPM | SYSTOLIC BLOOD PRESSURE: 128 MMHG | OXYGEN SATURATION: 98 % | TEMPERATURE: 97.1 F | WEIGHT: 218.8 LBS

## 2024-04-24 DIAGNOSIS — Z00.00 WELLNESS EXAMINATION: ICD-10-CM

## 2024-04-24 DIAGNOSIS — E66.9 OBESITY (BMI 35.0-39.9 WITHOUT COMORBIDITY): Chronic | ICD-10-CM

## 2024-04-24 DIAGNOSIS — J45.20 MILD INTERMITTENT ASTHMA WITHOUT COMPLICATION: Chronic | ICD-10-CM

## 2024-04-24 DIAGNOSIS — F33.0 MILD EPISODE OF RECURRENT MAJOR DEPRESSIVE DISORDER (HCC): Chronic | ICD-10-CM

## 2024-04-24 DIAGNOSIS — E03.9 ACQUIRED HYPOTHYROIDISM: Chronic | ICD-10-CM

## 2024-04-24 DIAGNOSIS — E78.2 MIXED HYPERLIPIDEMIA: Chronic | ICD-10-CM

## 2024-04-24 PROCEDURE — 3074F SYST BP LT 130 MM HG: CPT | Performed by: STUDENT IN AN ORGANIZED HEALTH CARE EDUCATION/TRAINING PROGRAM

## 2024-04-24 PROCEDURE — 99396 PREV VISIT EST AGE 40-64: CPT | Performed by: STUDENT IN AN ORGANIZED HEALTH CARE EDUCATION/TRAINING PROGRAM

## 2024-04-24 PROCEDURE — 3078F DIAST BP <80 MM HG: CPT | Performed by: STUDENT IN AN ORGANIZED HEALTH CARE EDUCATION/TRAINING PROGRAM

## 2024-04-24 ASSESSMENT — FIBROSIS 4 INDEX: FIB4 SCORE: 1.153558052434456929

## 2024-04-24 NOTE — ASSESSMENT & PLAN NOTE
Patient was initially hyperthyroid and treated with radioiodone resulting in hypothyroidism.  April 2024 TSH 5.780 but T4 1.30.  Currently on Levothyroxine 112 mcg daily.

## 2024-04-24 NOTE — ASSESSMENT & PLAN NOTE
BMI 35.32 today.  Patient reports she does a virtual workout for 15-30 minutes in the morning 5 days/week.  Patient also recently got a gym membership and tries to exercise for 45 minutes 4 times per week.  Patient also has a walking pad at home.  Patient would like to try diet and exercise prior to starting weight loss medication.  Patient declined referral to nutritionist.

## 2024-04-24 NOTE — PROGRESS NOTES
Subjective:     CC:   Chief Complaint   Patient presents with    Annual Exam       HPI:   Ruby Sue is a 58 y.o. female who presents for annual exam. She is feeling well and denies any complaints.    Obesity (BMI 35.0-39.9 without comorbidity)  BMI 35.32 today.  Patient reports she does a virtual workout for 15-30 minutes in the morning 5 days/week.  Patient also recently got a gym membership and tries to exercise for 45 minutes 4 times per week.  Patient also has a walking pad at home.  Patient would like to try diet and exercise prior to starting weight loss medication.  Patient declined referral to nutritionist.    MDD (major depressive disorder)  Stable on Lexapro 20 mg daily.    Mild intermittent asthma without complication  Well-controlled with Singulair 10 mg daily and albuterol as needed.    Acquired hypothyroidism  Patient was initially hyperthyroid and treated with radioiodone resulting in hypothyroidism.  2024 TSH 5.780 but T4 1.30.  Currently on Levothyroxine 112 mcg daily.    Mixed hyperlipidemia  2024 , TG 71, HDL 58, .  2020 CT coronary calcium score 0.  ASCVD risk 2.6%.      OBGYN/ History:    Patient has GYN provider: no  /Para:    Last Pap Smear:  18, denies history of abnormal pap smears.  Gyn Surgery:  tubal ligation.  Current Contraceptive Method:  tubal ligation and post-menopausal, currently sexually active with .  Last menstrual period:  .  Post-menopausal bleeding: recent episode but pelvic US was normal  Urinary incontinence: denies     Health Maintenance  Cholesterol Screening: 2024 , TG 71, HDL 58,   Diabetes Screening: 2024   Diet / Exercise: BMI 35.32, reports healthy diet and regular exercise  Smoking: former smoker  Substance Abuse: occasional alcohol use, denies illicit drug use   Safe in relationship: yes,    Dentist: no issues, will be getting dental  insurance  Ophthalmology: follows up regularly, wears glasses     Cancer screening  Colorectal Cancer Screening: colonoscopy negative Aug 2018, repeat 10 yrs  Cervical Cancer Screening: pap smear negative February 2023, repeat 5 yrs  Breast Cancer Screening: mammogram negative January 2023, repeat 2 yrs     Infectious disease screening/Immunizations  --Hepatitis C Screening: negative in 2015  --Immunizations:               Influenza: UTD              Tetanus: UTD              Shingles: UTD              Pneumococcal : UTD                Hepatitis B: UTD    She  has a past medical history of ASTHMA, Backpain, Depression, Hypothyroid (9/20/2009), Hypothyroid, Psychiatric problem, Thoracic vertebral fracture (HCC) (11/11), Thyroid disease (hyper s/p radioactive), and Vitamin D deficiency (8/13/2021).    She has no past medical history of Angina, ASTHMA, Bronchitis, Dialysis, Fall, Heart valve disease, Indigestion, Infectious disease, Jaundice, Other specified symptom associated with female genital organs, Pacemaker, Personal history of venous thrombosis and embolism, Pneumonia, Rheumatic fever, Unspecified hemorrhagic conditions, or Unspecified urinary incontinence.  She  has a past surgical history that includes tonsillectomy; tubal coagulation laparoscopic bilateral; pr breast reduction; and pr knee scope,part synovect (Left, 1/8/2024).    Family History   Problem Relation Age of Onset    Diabetes Mother     Heart Disease Mother         MI in 60's    Stroke Father         TIA    Cancer Father         skin, non-melanoma    Cancer Sister         skin, melanoma    No Known Problems Brother     Heart Disease Maternal Grandmother     Stroke Maternal Grandmother     Asthma Maternal Grandfather     Cancer Paternal Grandmother         lung, former smoker    No Known Problems Daughter     Asthma Daughter     No Known Problems Daughter     No Known Problems Daughter     Breast Cancer Neg Hx     Colorectal Cancer Neg Hx      Peritoneal Cancer Neg Hx     Tubal Cancer Neg Hx     Ovarian Cancer Neg Hx        Social History     Socioeconomic History    Marital status:      Spouse name: Not on file    Number of children: 4    Years of education: Not on file    Highest education level: 12th grade   Occupational History    Occupation: [employer]   Tobacco Use    Smoking status: Former     Types: Cigarettes    Smokeless tobacco: Never    Tobacco comments:     Smoked on/off for 10-15 years, 1/2-1 ppd   Vaping Use    Vaping Use: Never used   Substance and Sexual Activity    Alcohol use: Yes     Alcohol/week: 1.2 oz     Types: 2 Glasses of wine per week    Drug use: No    Sexual activity: Not Currently     Partners: Male     Birth control/protection: Female Sterilization     Comment:    Other Topics Concern    Not on file   Social History Narrative    4 children. Previously worked at 's dept now at [employer] office.      Social Determinants of Health     Financial Resource Strain: Low Risk  (12/28/2022)    Overall Financial Resource Strain (CARDIA)     Difficulty of Paying Living Expenses: Not hard at all   Food Insecurity: No Food Insecurity (12/28/2022)    Hunger Vital Sign     Worried About Running Out of Food in the Last Year: Never true     Ran Out of Food in the Last Year: Never true   Transportation Needs: No Transportation Needs (12/28/2022)    PRAPARE - Transportation     Lack of Transportation (Medical): No     Lack of Transportation (Non-Medical): No   Physical Activity: Sufficiently Active (4/23/2024)    Exercise Vital Sign     Days of Exercise per Week: 4 days     Minutes of Exercise per Session: 40 min   Stress: No Stress Concern Present (12/28/2022)    Citizen of [place] of Occupational Health - Occupational Stress Questionnaire     Feeling of Stress : Only a little   Social Connections: Unknown (4/23/2024)    Social Connection and Isolation Panel [NHANES]     Frequency of Communication with Friends and Family: Not on file      Frequency of Social Gatherings with Friends and Family: Not on file     Attends Restoration Services: Not on file     Active Member of Clubs or Organizations: No     Attends Club or Organization Meetings: Not on file     Marital Status:    Intimate Partner Violence: Not on file   Housing Stability: High Risk (12/28/2022)    Housing Stability Vital Sign     Unable to Pay for Housing in the Last Year: No     Number of Places Lived in the Last Year: 5     Unstable Housing in the Last Year: No       Patient Active Problem List    Diagnosis Date Noted    Postmenopausal bleeding 03/26/2024    Left knee pain 11/28/2023    Impingement syndrome involving patellar fat pad 11/28/2023    Bilateral chronic knee pain 07/17/2023    Elevated fasting glucose 02/17/2022    Seasonal allergies 05/14/2019    MDD (major depressive disorder) 12/08/2017    Mixed hyperlipidemia 04/30/2017    Mild intermittent asthma without complication 04/30/2017    Obesity (BMI 35.0-39.9 without comorbidity) 02/07/2016    Acquired hypothyroidism 09/20/2009         Current Outpatient Medications   Medication Sig Dispense Refill    Nutritional Supplements (ESTROVEN PO) Take  by mouth.      escitalopram (LEXAPRO) 20 MG tablet TAKE ONE TABLET BY MOUTH DAILY 90 Tablet 3    levothyroxine (SYNTHROID) 112 MCG Tab Take 1 Tablet by mouth every morning on an empty stomach. 90 Tablet 3    montelukast (SINGULAIR) 10 MG Tab Take 1 Tablet by mouth every day. 90 Tablet 3    albuterol (VENTOLIN HFA) 108 (90 Base) MCG/ACT Aero Soln inhalation aerosol Inhale 2 Puffs every four hours as needed for Shortness of Breath. 3 Each 3     No current facility-administered medications for this visit.     Allergies   Allergen Reactions    Cleocin [Clindamycin Hcl]     Pcn [Penicillins]        Review of Systems   Constitutional: Negative for fever, chills and malaise/fatigue.   HENT: Negative for congestion.    Eyes: Negative for pain or discharge.    Respiratory: Negative for  "cough and shortness of breath.  Cardiovascular: Negative for leg swelling.   Gastrointestinal: Negative for nausea, vomiting, abdominal pain and diarrhea.   Genitourinary: Negative for dysuria and hematuria.   Skin: Negative for rash.   Neurological: Negative for dizziness, focal weakness and headaches.   Endo/Heme/Allergies: Does not bleed easily.   Psychiatric/Behavioral: Negative for depression, anxiety, and suicidal thoughts.    Objective:     /64 (BP Location: Right arm, Patient Position: Sitting, BP Cuff Size: Adult)   Pulse 84   Temp 36.2 °C (97.1 °F) (Temporal)   Ht 1.676 m (5' 6\")   Wt 99.2 kg (218 lb 12.8 oz)   SpO2 98%   BMI 35.32 kg/m²   Body mass index is 35.32 kg/m².  Wt Readings from Last 4 Encounters:   04/24/24 99.2 kg (218 lb 12.8 oz)   03/26/24 100 kg (221 lb)   01/08/24 99.3 kg (218 lb 14.7 oz)   11/08/23 95.3 kg (210 lb)       Physical Exam:  Constitutional: Well-developed and well-nourished. No acute distress.   Skin: Skin is warm and dry. No rash noted.  Head: Atraumatic without lesions.  Eyes: Conjunctivae and extraocular motions are normal. Pupils are equal, round, and reactive to light. No scleral icterus.   Ears:  External ears unremarkable. Tympanic membranes clear and intact.  Right hearing aid.  Mouth/Throat: Dentition is good. Tongue normal.  Neck: Supple, trachea midline. Normal range of motion. No thyromegaly present. No lymphadenopathy.  Cardiovascular: Regular rate and rhythm, S1 and S2 without murmur, rubs, or gallops.  Lungs: Normal inspiratory effort, CTA bilaterally, no wheezes/rhonchi/rales  Abdomen: Soft, non tender, and without distention. Active bowel sounds.  Extremities: No cyanosis, clubbing, erythema, or edema.  Musculoskeletal: Normal ROM in all extremities.  Neurological: Alert and oriented x 3. No acute focal deficits.   Psychiatric:  Behavior, mood, and affect are appropriate.        Labs:  Hospital Outpatient Visit on 04/19/2024   Component Date Value " Ref Range Status    Cholesterol,Tot 04/19/2024 246 (H)  100 - 199 mg/dL Final    Triglycerides 04/19/2024 71  0 - 149 mg/dL Final    HDL 04/19/2024 58  >=40 mg/dL Final    LDL 04/19/2024 174 (H)  <100 mg/dL Final    Sodium 04/19/2024 140  135 - 145 mmol/L Final    Potassium 04/19/2024 4.8  3.6 - 5.5 mmol/L Final    Chloride 04/19/2024 105  96 - 112 mmol/L Final    Co2 04/19/2024 24  20 - 33 mmol/L Final    Anion Gap 04/19/2024 11.0  7.0 - 16.0 Final    Glucose 04/19/2024 108 (H)  65 - 99 mg/dL Final    Bun 04/19/2024 17  8 - 22 mg/dL Final    Creatinine 04/19/2024 0.70  0.50 - 1.40 mg/dL Final    Calcium 04/19/2024 9.1  8.5 - 10.5 mg/dL Final    Correct Calcium 04/19/2024 8.7  8.5 - 10.5 mg/dL Final    AST(SGOT) 04/19/2024 22  12 - 45 U/L Final    ALT(SGPT) 04/19/2024 16  2 - 50 U/L Final    Alkaline Phosphatase 04/19/2024 58  30 - 99 U/L Final    Total Bilirubin 04/19/2024 0.6  0.1 - 1.5 mg/dL Final    Albumin 04/19/2024 4.5  3.2 - 4.9 g/dL Final    Total Protein 04/19/2024 7.2  6.0 - 8.2 g/dL Final    Globulin 04/19/2024 2.7  1.9 - 3.5 g/dL Final    A-G Ratio 04/19/2024 1.7  g/dL Final    WBC 04/19/2024 4.8  4.8 - 10.8 K/uL Final    RBC 04/19/2024 4.63  4.20 - 5.40 M/uL Final    Hemoglobin 04/19/2024 14.4  12.0 - 16.0 g/dL Final    Hematocrit 04/19/2024 42.3  37.0 - 47.0 % Final    MCV 04/19/2024 91.4  81.4 - 97.8 fL Final    MCH 04/19/2024 31.1  27.0 - 33.0 pg Final    MCHC 04/19/2024 34.0  32.2 - 35.5 g/dL Final    Please note new reference range effective 05/22/2023.    RDW 04/19/2024 44.7  35.9 - 50.0 fL Final    Platelet Count 04/19/2024 267  164 - 446 K/uL Final    MPV 04/19/2024 11.3  9.0 - 12.9 fL Final    TSH 04/19/2024 5.780 (H)  0.380 - 5.330 uIU/mL Final    Comment: The 2011 American Thyroid Association (MARTIN) guidelines  recommended that the interpretation of thyroid function in  pregnancy be based on trimester specific reference ranges.    1st Trimester  0.100-2.500 mIU/L  2nd Trimester  0.200-3.000  mIU/L  3rd Trimester  0.300-3.500 mIU/L    These established reference ranges have not been validated  at [place].      Fasting Status 04/19/2024 Fasting   Final    GFR (CKD-EPI) 04/19/2024 101  >60 mL/min/1.73 m 2 Final    Comment: Estimated Glomerular Filtration Rate is calculated using  race neutral CKD-EPI 2021 equation per NKF-ASN recommendations.      Free T-4 04/19/2024 1.30  0.93 - 1.70 ng/dL Final         Assessment and Plan:     1. Wellness examination  Annual preventive exam done today.  Labs reviewed with the patient.  UTD with mammogram, pap smear, colonoscopy, vaccines.    2. Obesity (BMI 35.0-39.9 without comorbidity)  Chronic, uncontrolled.  BMI 35.32 today.  Patient declined referral to nutritionist.  Encouraged to continue healthy diet and regular exercise.  Patient would like to try lifestyle changes for 3 months but if unable to lose weight consider weight loss medication.    3. Mild episode of recurrent major depressive disorder (HCC)  Chronic, stable.  Continue Lexapro 20 mg daily.    4. Mild intermittent asthma without complication  Chronic, controlled.  Continue singular 10 mg daily and albuterol as needed.    5. Acquired hypothyroidism  Chronic, controlled.  TSH elevated but T4 WNL.  Continue levothyroxine 112 mcg daily.    6. Mixed hyperlipidemia  Chronic, uncontrolled.   and .  January 2020 CT coronary calcium score 0.  ASCVD risk 2.6%.  Risk was discussed with the patient and statin not indicated.  Advised to limit saturated/trans fat in diet and continue regular exercise.      Anticipatory guidance  --Discussed moderation in sodium/caffeine intake, saturated fat and cholesterol, caloric balance, sufficient fresh fruits/vegetables.  --Discussed brushing, flossing, and dental visits.   --Encouraged 150 minutes of exercise weekly.   --Discussed tobacco, alcohol, and other drug use.  --Discussed calcium + Vit D supplement.  --Discussed safety belts, safety  helmets, smoke detector, gun safety, etc.  --Discussed sun protection with minimum of spf 30.        Follow-up: Return in about 3 months (around 7/24/2024) for f/u weight.

## 2024-05-01 DIAGNOSIS — E66.9 OBESITY (BMI 35.0-39.9 WITHOUT COMORBIDITY): Chronic | ICD-10-CM

## 2024-05-09 ENCOUNTER — NON-PROVIDER VISIT (OUTPATIENT)
Dept: INTERNAL MEDICINE | Facility: OTHER | Age: 59
End: 2024-05-09
Payer: COMMERCIAL

## 2024-05-09 VITALS — WEIGHT: 219 LBS | HEIGHT: 66 IN | BODY MASS INDEX: 35.2 KG/M2

## 2024-05-09 DIAGNOSIS — E66.9 OBESITY (BMI 30-39.9): ICD-10-CM

## 2024-05-09 DIAGNOSIS — Z71.3 DIETARY COUNSELING AND SURVEILLANCE: ICD-10-CM

## 2024-05-09 DIAGNOSIS — E78.5 HYPERLIPIDEMIA, UNSPECIFIED HYPERLIPIDEMIA TYPE: ICD-10-CM

## 2024-05-09 DIAGNOSIS — R73.01 IMPAIRED FASTING GLUCOSE: ICD-10-CM

## 2024-05-09 PROCEDURE — 97802 MEDICAL NUTRITION INDIV IN: CPT | Performed by: DIETITIAN, REGISTERED

## 2024-05-09 ASSESSMENT — FIBROSIS 4 INDEX: FIB4 SCORE: 1.153558052434456929

## 2024-05-09 NOTE — PATIENT INSTRUCTIONS
"I will be aware of my hunger signals and my energy needs  - be observant of my weekends vs workdays practices  - keep tracking on My Macros dawson- build awareness    I will keep my physical activity level as is  - change up machines  - find new strength activities at gym  - ACE 25 At Home Exercises or VR      My Health Profile:    PHYSICAL ASSESSMENT  Current Height:  66\"  Ht Readings from Last 1 Encounters:   04/24/24 1.676 m (5' 6\")     Current Weight:  219#  Wt Readings from Last 1 Encounters:   04/24/24 99.2 kg (218 lb 12.8 oz)     BMI: 35    Goal Weight:  150#  HABW: 220#   down to 111# ; remarried and gained wt.    Total Body Water (lbs): 82#  Total Body Water (%): 37.4%  Visceral Fat: 13   (Range: Less than 13)  Total Body Fat: (lbs): 105.6#  % Body Fat: 48.2%   Muscle Mass (lbs): 107.6#  Muscle Mass (%): 49%  Fat-Free Mass: 113.4#  Resting Metabolic Rate: 1608 calories  Weight Loss Calories: 8064-5329 calories per day  Maintenance Calories: 2075 calories per day  Protein needs: 99 grams protein per day  Fiber : 25-30 grams fiber per day    NUTRITION PHYSICAL ASSESSMENT:  Waist cm: 46.5\"  "

## 2024-05-09 NOTE — PROGRESS NOTES
"Ruby Sue is a 58 y.o. year old, female initial nutrition evaluation for weight management     Wellness Vision:  I want to stay away from diabetes, be healthy- improved cholesterol, less weight, so that I can have a long, healthy life and enjoy jail.    My Health Profile:    PHYSICAL ASSESSMENT  Current Height:  66\"  Ht Readings from Last 1 Encounters:   04/24/24 1.676 m (5' 6\")     Current Weight:  219#  Wt Readings from Last 1 Encounters:   04/24/24 99.2 kg (218 lb 12.8 oz)     BMI: 35    Goal Weight:  150#  HABW: 220#   down to 111# ; remarried and gained wt.    Total Body Water (lbs): 82#  Total Body Water (%): 37.4%  Visceral Fat: 13   (Range: Less than 13)  Total Body Fat: (lbs): 105.6#  % Body Fat: 48.2%   Muscle Mass (lbs): 107.6#  Muscle Mass (%): 49%  Fat-Free Mass: 113.4#  Resting Metabolic Rate: 1608 calories  Weight Loss Calories: 3053-1396 calories per day  Maintenance Calories: 2075 calories per day  Protein needs: 99 grams protein per day    NUTRITION PHYSICAL ASSESSMENT:  Waist cm: 46.5\"    FLUID INTAKE:  60 + oz water  Typical Beverages: green tea, coffee, reg Coke 2/7 w/snack at night or tacos, monster  Servings Alcohol/D/WK/MO: wine on w/e; 2-liquor beverages 1-2 x/week;  is not drinking anymore    ACTIVITY REVIEW: AM: 5/7 VR workout 15-30 min.  45 min on TM 4-5 x/week at gym; no strength  Current Exercise:  ADL's  Hobbies:     PERTINENT LABS:     CT Score (CAC)=0   Latest Reference Range & Units 04/19/24 09:49   Cholesterol,Tot 100 - 199 mg/dL 246 (H)   Triglycerides 0 - 149 mg/dL 71   HDL >=40 mg/dL 58   LDL <100 mg/dL 174 (H)      Latest Reference Range & Units 04/19/24 09:49   Glucose 65 - 99 mg/dL 108 (H)     Patient Behaviors (indicate frequency)  Meal/Snack Pattern:   2-3 meals+1 snack    B: scrambled egg+cheese, coffee+splenda+protein powder, whole milk  L: 4 oz. Chicken+ broccoli+brown rice  S: not a sweet tooth, grab handful almonds or cheese  D: frozen " pizza out of convenience    Protein: every meal- mostly animal proteins  Fruit: not much, occ banana as a snack  Ve-serving per day    Dines away from Home: once a week  Locations: [place]  Who lives in home: spouse, self, adult child  Additional Patient Behavior Information: both cook, self-shops    PES: Obesity II, HLD r/t infrequent eating, low intake vegetables and fruit, restricted intake as evidenced by BMI 35.    NUTRITION COMMENTS:    Ruby is a 57 yo who retired from  office as [employer], now back to work f/t.at [employer].  eats once a day, Ruby eats more often.    Weight loss efforts included: high stress, weight loss challenge with restrictive diet,weight plateau.    Ruby did not want to go on phentermine d/t feels she does not need an appetite suppressant.Started eating more and feels hungry, has historically not felt hungry.    Tracking intake on phone dawson over the last few weeks on Macro:  6699-9276 calories  (was 3656-3796 calories per day)  100 grams protein  5 grams fiber  <200 grams carbs/day    Begin lifestyle medicine observations and environmental awareness. Reinforced commitments made today with new practices to bring her to healthy weight management and health risk reduction.    RTC x next available    Time Spent: 60 minutes

## 2024-06-24 ENCOUNTER — APPOINTMENT (OUTPATIENT)
Dept: INTERNAL MEDICINE | Facility: OTHER | Age: 59
End: 2024-06-24
Payer: COMMERCIAL

## 2024-06-24 VITALS — BODY MASS INDEX: 35.19 KG/M2 | WEIGHT: 218 LBS

## 2024-06-24 DIAGNOSIS — E78.5 HYPERLIPIDEMIA, UNSPECIFIED HYPERLIPIDEMIA TYPE: ICD-10-CM

## 2024-06-24 DIAGNOSIS — Z71.3 DIETARY COUNSELING AND SURVEILLANCE: ICD-10-CM

## 2024-06-24 DIAGNOSIS — E66.9 OBESITY (BMI 30-39.9): ICD-10-CM

## 2024-06-24 PROCEDURE — 97803 MED NUTRITION INDIV SUBSEQ: CPT | Performed by: DIETITIAN, REGISTERED

## 2024-06-24 ASSESSMENT — FIBROSIS 4 INDEX: FIB4 SCORE: 1.153558052434456929

## 2024-06-24 NOTE — PROGRESS NOTES
"Ruby Sue is a 58 y.o. year old, female returns for weight management follow up.    Positive changes since last visit:    Back from [place] vacation, pulled muscle in left side; morning virtual physical activity class on hold    Awareness to eat more on weekends- support hunger on days off vs skipping meals    Meal/Eating/Environment Pattern:    Walking around office to move more  Drinking more water- gets up and moving    Weight maintained- not real evidence on the scale       PHYSICAL RE-ASSESSMENT  Current Height:  66\"      Ht Readings from Last 1 Encounters:   04/24/24 1.676 m (5' 6\")      Current Weight:  218#      Wt Readings from Last 1 Encounters:   04/24/24 99.2 kg (218 lb 12.8 oz)      BMI: 35     Goal Weight:  150#  HABW: 220#   down to 111# ; remarried and gained wt.     Total Body Water (lbs): 83.6#  Total Body Water (%): 38.3%  Visceral Fat: 13   (Range: Less than 13)  Total Body Fat: (lbs): 102.2#  % Body Fat: 46.9%   Muscle Mass (lbs): 110#  Muscle Mass (%): 50%  Fat-Free Mass: 115.8#  Resting Metabolic Rate: 1608 calories  Weight Loss Calories: 7848-0872 calories per day  Maintenance Calories: 2075 calories per day  Protein needs: 99 grams protein per day     NUTRITION PHYSICAL ASSESSMENT:  Waist cm: 45\"    Comments:    Not logging cals, but tracking if boredom eating  Planning, prepping and measuring lunches  Dinners hard to plan with spouse and self- tends to be not as planned- less frozen pizzas    Reinforced work Ruby has been doing this last month; body composition changes are seen; maintain progress and keep her healthy mindset!    RTC x next available    Time Spent:  45 minutes   "

## 2024-06-24 NOTE — PATIENT INSTRUCTIONS
"I will journal what I am doing to be mindful of my movement  - at work  - at home  - at the gym  - bring in another activity after work- [employer], something I look forward to!  - find a new cardio machine at the gym or change up pattern on cardio machine- FUN!    Keep progress and celebrate WINS!    PHYSICAL RE-ASSESSMENT  Current Height:  66\"      Ht Readings from Last 1 Encounters:   04/24/24 1.676 m (5' 6\")      Current Weight:  218#      Wt Readings from Last 1 Encounters:   04/24/24 99.2 kg (218 lb 12.8 oz)      BMI: 35     Goal Weight:  150#  HABW: 220#   down to 111# ; remarried and gained wt.     Total Body Water (lbs): 83.6#  Total Body Water (%): 38.3%  Visceral Fat: 13   (Range: Less than 13)  Total Body Fat: (lbs): 102.2#  % Body Fat: 46.9%   Muscle Mass (lbs): 110#  Muscle Mass (%): 50%  Fat-Free Mass: 115.8#  Resting Metabolic Rate: 1608 calories  Weight Loss Calories: 6107-1109 calories per day  Maintenance Calories: 2075 calories per day  Protein needs: 99 grams protein per day     NUTRITION PHYSICAL ASSESSMENT:  Waist cm: 45\"      My Health Profile:  BASELINE DATA     PHYSICAL ASSESSMENT  Current Height:  66\"      Ht Readings from Last 1 Encounters:   04/24/24 1.676 m (5' 6\")      Current Weight:  219#      Wt Readings from Last 1 Encounters:   04/24/24 99.2 kg (218 lb 12.8 oz)      BMI: 35     Goal Weight:  150#  HABW: 220#   down to 111# ; remarried and gained wt.     Total Body Water (lbs): 82#  Total Body Water (%): 37.4%  Visceral Fat: 13   (Range: Less than 13)  Total Body Fat: (lbs): 105.6#  % Body Fat: 48.2%   Muscle Mass (lbs): 107.6#  Muscle Mass (%): 49%  Fat-Free Mass: 113.4#  Resting Metabolic Rate: 1608 calories  Weight Loss Calories: 3926-6027 calories per day  Maintenance Calories: 2075 calories per day  Protein needs: 99 grams protein per day     NUTRITION PHYSICAL ASSESSMENT:  Waist cm: 46.5\"  "

## 2024-06-27 ENCOUNTER — HOSPITAL ENCOUNTER (OUTPATIENT)
Dept: LAB | Facility: MEDICAL CENTER | Age: 59
End: 2024-06-27
Attending: STUDENT IN AN ORGANIZED HEALTH CARE EDUCATION/TRAINING PROGRAM
Payer: COMMERCIAL

## 2024-06-27 DIAGNOSIS — E78.2 MIXED HYPERLIPIDEMIA: ICD-10-CM

## 2024-06-27 PROCEDURE — 36415 COLL VENOUS BLD VENIPUNCTURE: CPT

## 2024-06-27 PROCEDURE — 82172 ASSAY OF APOLIPOPROTEIN: CPT

## 2024-06-29 LAB — APO B100 SERPL-MCNC: 123 MG/DL (ref 60–117)

## 2024-07-29 ENCOUNTER — OFFICE VISIT (OUTPATIENT)
Dept: MEDICAL GROUP | Facility: PHYSICIAN GROUP | Age: 59
End: 2024-07-29
Payer: COMMERCIAL

## 2024-07-29 VITALS
BODY MASS INDEX: 35.03 KG/M2 | WEIGHT: 218 LBS | SYSTOLIC BLOOD PRESSURE: 118 MMHG | HEIGHT: 66 IN | DIASTOLIC BLOOD PRESSURE: 68 MMHG | HEART RATE: 73 BPM | OXYGEN SATURATION: 97 % | TEMPERATURE: 97.7 F

## 2024-07-29 DIAGNOSIS — R73.03 PREDIABETES: ICD-10-CM

## 2024-07-29 DIAGNOSIS — M79.641 PAIN IN BOTH HANDS: ICD-10-CM

## 2024-07-29 DIAGNOSIS — Z23 NEED FOR VACCINATION: ICD-10-CM

## 2024-07-29 DIAGNOSIS — R73.01 ELEVATED FASTING GLUCOSE: ICD-10-CM

## 2024-07-29 DIAGNOSIS — E78.2 MIXED HYPERLIPIDEMIA: Chronic | ICD-10-CM

## 2024-07-29 DIAGNOSIS — E66.9 OBESITY (BMI 35.0-39.9 WITHOUT COMORBIDITY): Chronic | ICD-10-CM

## 2024-07-29 DIAGNOSIS — M79.642 PAIN IN BOTH HANDS: ICD-10-CM

## 2024-07-29 LAB
HBA1C MFR BLD: 6 % (ref ?–5.8)
POCT INT CON NEG: NEGATIVE
POCT INT CON POS: POSITIVE

## 2024-07-29 ASSESSMENT — FIBROSIS 4 INDEX: FIB4 SCORE: 1.153558052434456929

## 2024-08-01 ENCOUNTER — TELEPHONE (OUTPATIENT)
Dept: HEALTH INFORMATION MANAGEMENT | Facility: OTHER | Age: 59
End: 2024-08-01

## 2024-08-14 ENCOUNTER — NON-PROVIDER VISIT (OUTPATIENT)
Dept: INTERNAL MEDICINE | Facility: OTHER | Age: 59
End: 2024-08-14
Payer: COMMERCIAL

## 2024-08-14 ENCOUNTER — APPOINTMENT (OUTPATIENT)
Dept: INTERNAL MEDICINE | Facility: OTHER | Age: 59
End: 2024-08-14
Payer: COMMERCIAL

## 2024-08-14 DIAGNOSIS — E78.5 HYPERLIPIDEMIA, UNSPECIFIED HYPERLIPIDEMIA TYPE: ICD-10-CM

## 2024-08-14 DIAGNOSIS — E66.9 OBESITY (BMI 30-39.9): ICD-10-CM

## 2024-08-14 DIAGNOSIS — E03.9 ACQUIRED HYPOTHYROIDISM: Chronic | ICD-10-CM

## 2024-08-14 DIAGNOSIS — Z71.3 DIETARY COUNSELING AND SURVEILLANCE: ICD-10-CM

## 2024-08-14 PROCEDURE — 97803 MED NUTRITION INDIV SUBSEQ: CPT | Performed by: DIETITIAN, REGISTERED

## 2024-08-14 NOTE — PROGRESS NOTES
"Ruby Sue is a 58 y.o. year old, female returns for weight management follow up.    Positive changes since last visit:    Cardio- 2/7  Weights- 2/7  Rucking- 1/7 with more weights    Meal/Eating/Environment Pattern:    B: 2-3 eggs +/- cheese, 1 cup milk, protein powder w/coffee  L:  5-6 oz chicken, 1/2 cup rice, 1 cup broccoli  S: apple, banana, trail mix  D: beef straganoff  Alcohol- Thursdays 2 drinks/week and weekend 2 drinks    High stressors- coping with daughter living with her.    Would like to   PHYSICAL RE-ASSESSMENT  Current Height:  66\"        Ht Readings from Last 1 Encounters:   04/24/24 1.676 m (5' 6\")      Current Weight:  216#        Wt Readings from Last 1 Encounters:   04/24/24 99.2 kg (218 lb 12.8 oz)      BMI: 35     Goal Weight:  150#  HABW: 220#   down to 111# ; remarried and gained wt.     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    Repeat body composition: decrease in muscle     Reinforced new fortitude and awareness in her carb choices and healthy coping strategies.       Latest Reference Range & Units 05/12/20 08:05 08/20/20 08:10 01/26/23 07:39 07/29/24 08:41   Glycohemoglobin 5.8 % 6.4 (H) 5.4 5.6 6.0 !       RTC x next available     Time Spent:  60 minutes   "

## 2024-08-14 NOTE — PATIENT INSTRUCTIONS
"I will bring down my alcohol at home  - not my \"Go to\" when stressors present  - substitute with water  - go for a walk- with nicer weather  - get in my backyard  - read    Add in more less carb choices with insulin resistance  - veggies with fruit if eating  - aim for 7 servings of veggies per day- feel the difference!    BREATHE!      Re-measurements:    Weight: 216#  Total Body Water (lbs): 81.2#  Total Body Water (%): 37.5%  Visceral Fat: 13   (Range: Less than 13)  Total Body Fat: (lbs): 104.2#  % Body Fat: 48.1%   Muscle Mass (lbs): 106.8#  Muscle Mass (%): 49%  Fat-Free Mass: 112.6#  Resting Metabolic Rate: 1608 calories  Weight Loss Calories: 9885-9142 calories per day  Maintenance Calories: 2075 calories per day  Protein needs: 99 grams protein per day     NUTRITION PHYSICAL ASSESSMENT:  Waist cm: 45.5\"  "

## 2024-08-14 NOTE — PROGRESS NOTES
Patient requested a new referral to endocrinology because her current endocrinologist will not see her.

## 2024-08-30 ENCOUNTER — TELEPHONE (OUTPATIENT)
Dept: VASCULAR LAB | Facility: MEDICAL CENTER | Age: 59
End: 2024-08-30
Payer: COMMERCIAL

## 2024-08-30 NOTE — TELEPHONE ENCOUNTER
Spoke to patient in regarding NP appointment.    Any recent testing (labs or imaging) outside of [facility]?  No    Were any records requested?  No    New patient packet sent via e[employer] or mail?  Yes    Referral attached to appointment (renewals and New patients only)? Yes    Is appointment virtual? No

## 2024-09-03 ENCOUNTER — APPOINTMENT (OUTPATIENT)
Dept: INTERNAL MEDICINE | Facility: OTHER | Age: 59
End: 2024-09-03
Payer: COMMERCIAL

## 2024-09-04 ENCOUNTER — OFFICE VISIT (OUTPATIENT)
Dept: VASCULAR LAB | Facility: MEDICAL CENTER | Age: 59
End: 2024-09-04
Attending: FAMILY MEDICINE
Payer: COMMERCIAL

## 2024-09-04 VITALS
HEIGHT: 66 IN | DIASTOLIC BLOOD PRESSURE: 76 MMHG | BODY MASS INDEX: 35.03 KG/M2 | WEIGHT: 218 LBS | SYSTOLIC BLOOD PRESSURE: 103 MMHG | HEART RATE: 82 BPM

## 2024-09-04 DIAGNOSIS — Z82.49 FAMILY HISTORY OF PREMATURE CORONARY ARTERY DISEASE: Chronic | ICD-10-CM

## 2024-09-04 DIAGNOSIS — E66.9 CLASS 2 OBESITY WITHOUT SERIOUS COMORBIDITY WITH BODY MASS INDEX (BMI) OF 35.0 TO 35.9 IN ADULT, UNSPECIFIED OBESITY TYPE: ICD-10-CM

## 2024-09-04 DIAGNOSIS — R73.03 PREDIABETES: Chronic | ICD-10-CM

## 2024-09-04 DIAGNOSIS — E78.00 PRIMARY HYPERCHOLESTEROLEMIA: Chronic | ICD-10-CM

## 2024-09-04 DIAGNOSIS — E03.9 ACQUIRED HYPOTHYROIDISM: Chronic | ICD-10-CM

## 2024-09-04 PROCEDURE — 99204 OFFICE O/P NEW MOD 45 MIN: CPT | Performed by: FAMILY MEDICINE

## 2024-09-04 PROCEDURE — 3078F DIAST BP <80 MM HG: CPT | Performed by: FAMILY MEDICINE

## 2024-09-04 PROCEDURE — 99212 OFFICE O/P EST SF 10 MIN: CPT

## 2024-09-04 PROCEDURE — 3074F SYST BP LT 130 MM HG: CPT | Performed by: FAMILY MEDICINE

## 2024-09-04 ASSESSMENT — ENCOUNTER SYMPTOMS
CHILLS: 0
WHEEZING: 0
PND: 0
HEMOPTYSIS: 0
CLAUDICATION: 0
PALPITATIONS: 0
ORTHOPNEA: 0
COUGH: 0
FEVER: 0
SPUTUM PRODUCTION: 0
SHORTNESS OF BREATH: 0

## 2024-09-04 ASSESSMENT — FIBROSIS 4 INDEX: FIB4 SCORE: 1.153558052434456929

## 2024-09-04 NOTE — PROGRESS NOTES
INITIAL FAMILY LIPID CLINIC VISIT   09/04/24     Ruby Sue has been referred for evaluation and mgmt of dyslipidemia  Referral Source: Asahnti Figueroa M.D.   Est 9/2024     Subjective    Initial visit history: seen by PCP in 7/2024 and noted to have ongoing high LDL-C and high ApoB.  Had prior CACS = 0 in 2020.  Here to review treatment options in light of fhx of ASCVD.       PERTINENT HLD PMHX  Age at Initial Diagnosis of Dyslipidemia: early 50s     Baseline Lipids Prior to Treatment:   Lab Results   Component Value Date/Time    CHOLSTRLTOT 246 (H) 04/19/2024 09:49 AM     (H) 04/19/2024 09:49 AM    HDL 58 04/19/2024 09:49 AM    TRIGLYCERIDE 71 04/19/2024 09:49 AM       History of ASCVD: None  Other Established (non-atherosclerotic) Vascular Disease: None  Secondary contributors/causes of dyslipidemia:  Endocrine/Hypothyroidism:    hypothyroidism - currently high TSH, on meds  preDM - presumed insulin resistance   Liver disease: none reported   Renal disease/nephrotic syndrome:  none reported  Dietary-induced (ketogenic, lean mass hyper-responder)? no  Medications: None  Previously Attempted Interventions for Lipids - including outcome  Statin: none     Outcome: not applicable  Non-Statin: none  Outcome: not applicable    CURRENT MED MGMT  Current Lipid Lowering Meds:   Statin: None  Non-Statin: None  Supplements: None  Current adverse drug reactions/side effects? N\A  Adherence? N\A    LIFESTYLE MGMT  Change in weight: Stable  Exercise habits: moderate regular exercise program  Dietary patterns: generally heart healthy choices, no specific approach   Etoh: see sochx  Barriers to care/SDOH: none  Tobacco:  reports that she has quit smoking. Her smoking use included cigarettes. She has never used smokeless tobacco.     OTHER CV RISK FACTORS:  HTN: no prior dx or meds    Dysglycemia: yes, preDM, no formal T2D dx in past   Antithrombotic tx: no      Patient Active Problem List   Diagnosis    Acquired  hypothyroidism    Obesity (BMI 35.0-39.9 without comorbidity)    Mixed hyperlipidemia    Mild intermittent asthma without complication    MDD (major depressive disorder)    Seasonal allergies    Prediabetes    Bilateral chronic knee pain    Left knee pain    Impingement syndrome involving patellar fat pad    Postmenopausal bleeding    Primary hypercholesterolemia    Class 2 severe obesity with serious comorbidity and body mass index (BMI) of 35.0 to 35.9 in adult, unspecified obesity type (HCC)    Family history of premature coronary artery disease      has a past surgical history that includes tonsillectomy; tubal coagulation laparoscopic bilateral; pr breast reduction; and pr knee scope,part synovect (Left, 2024).  Current Outpatient Medications on File Prior to Visit   Medication Sig Dispense Refill    Nutritional Supplements (ESTROVEN PO) Take  by mouth.      escitalopram (LEXAPRO) 20 MG tablet TAKE ONE TABLET BY MOUTH DAILY 90 Tablet 3    levothyroxine (SYNTHROID) 112 MCG Tab Take 1 Tablet by mouth every morning on an empty stomach. 90 Tablet 3    montelukast (SINGULAIR) 10 MG Tab Take 1 Tablet by mouth every day. 90 Tablet 3    albuterol (VENTOLIN HFA) 108 (90 Base) MCG/ACT Aero Soln inhalation aerosol Inhale 2 Puffs every four hours as needed for Shortness of Breath. 3 Each 3     No current facility-administered medications on file prior to visit.      Allergies   Allergen Reactions    Cleocin [Clindamycin Hcl]     Pcn [Penicillins]       Family History   Problem Relation Age of Onset    Diabetes Mother 50        T2    Heart Disease Mother 60        PCI few times, MI    Transient ischemic attack Father 81    Cancer Father         skin, non-melanoma    Cancer Sister         skin, melanoma    Hyperlipidemia Sister     Heart Disease Sister         elevated CACS    No Known Problems Brother     Heart Disease Maternal Grandmother 58         - MIs, valvular dz    Stroke Maternal Grandmother     Asthma  "Maternal Grandfather     Cancer Paternal Grandmother         lung, former smoker    No Known Problems Daughter     Asthma Daughter     No Known Problems Daughter     No Known Problems Daughter     Breast Cancer Neg Hx     Colorectal Cancer Neg Hx     Peritoneal Cancer Neg Hx     Tubal Cancer Neg Hx     Ovarian Cancer Neg Hx      Social History     Tobacco Use    Smoking status: Former     Types: Cigarettes    Smokeless tobacco: Never    Tobacco comments:     Smoked on/off for 10-15 years, 1/2-1 ppd   Vaping Use    Vaping status: Never Used   Substance Use Topics    Alcohol use: Yes     Alcohol/week: 1.2 oz     Types: 2 Glasses of wine per week    Drug use: No     Review of Systems   Constitutional:  Negative for chills, fever and malaise/fatigue.   Respiratory:  Negative for cough, hemoptysis, sputum production, shortness of breath and wheezing.    Cardiovascular:  Negative for chest pain, palpitations, orthopnea, claudication, leg swelling and PND.         Objective    Vitals:    09/04/24 1403   BP: 103/76   BP Location: Left arm   Patient Position: Sitting   BP Cuff Size: Large adult   Pulse: 82   Weight: 98.9 kg (218 lb)   Height: 1.676 m (5' 6\")     BP Readings from Last 5 Encounters:   09/04/24 103/76   07/29/24 118/68   04/24/24 128/64   03/26/24 128/68   01/08/24 128/86     BMI Readings from Last 1 Encounters:   09/04/24 35.19 kg/m²     Wt Readings from Last 3 Encounters:   09/04/24 98.9 kg (218 lb)   07/29/24 98.9 kg (218 lb)   06/24/24 98.9 kg (218 lb)     Physical Exam  Constitutional:       General: She is not in acute distress.     Appearance: Normal appearance. She is not diaphoretic.   HENT:      Head: Normocephalic and atraumatic.   Eyes:      Conjunctiva/sclera: Conjunctivae normal.   Cardiovascular:      Rate and Rhythm: Normal rate and regular rhythm.      Pulses:           Carotid pulses are 2+ on the right side and 2+ on the left side.       Radial pulses are 2+ on the right side and 2+ on the " "left side.        Dorsalis pedis pulses are 2+ on the right side and 2+ on the left side.        Posterior tibial pulses are 2+ on the right side and 2+ on the left side.      Heart sounds: Normal heart sounds.   Pulmonary:      Effort: Pulmonary effort is normal.      Breath sounds: Normal breath sounds.   Musculoskeletal:      Right lower leg: No edema.      Left lower leg: No edema.   Skin:     General: Skin is warm and dry.   Neurological:      Mental Status: She is alert and oriented to person, place, and time.      Cranial Nerves: No cranial nerve deficit.      Gait: Gait is intact.   Psychiatric:         Mood and Affect: Mood and affect normal.       DATA REVIEW:  Most Recent Lipid Panel:   Lab Results   Component Value Date/Time    CHOLSTRLTOT 246 (H) 2024 09:49 AM     (H) 2024 09:49 AM    HDL 58 2024 09:49 AM    TRIGLYCERIDE 71 2024 09:49 AM     Lab Results   Component Value Date/Time     (H) 2024 09:49 AM     (H) 2023 07:39 AM     (H) 2021 11:08 AM     (H) 2020 08:05 AM     (H) 2020 08:05 AM      No results found for: \"LIPOPROTA\"   Lab Results   Component Value Date/Time    APOB 123 (H) 2024 08:26 AM      No results found for: \"CRPHIGHSEN\"    Other Pertinent Blood Work:   Lab Results   Component Value Date    SODIUM 140 2024    POTASSIUM 4.8 2024    CHLORIDE 105 2024    CO2 24 2024    ANION 11.0 2024    GLUCOSE 108 (H) 2024    BUN 17 2024    CREATININE 0.70 2024    CALCIUM 9.1 2024    ASTSGOT 22 2024    ALTSGPT 16 2024    ALKPHOSPHAT 58 2024    TBILIRUBIN 0.6 2024    ALBUMIN 4.5 2024    AGRATIO 1.7 2024    TSHULTRASEN 5.780 (H) 2024       VASCULAR IMAGIN/2020 CACS  Coronary calcification:  LMA - 0.0  LCX - 0.0  LAD - 0.0  RCA - 0.0  PDA - 0.0   Total Calcium Score: 0.0   Percentile: Calcium score is below " "the 75th percentile for the patient's age and sex.  Other findings:  Heart: Normal size.  Lungs: Clear.  Mediastinum: Normal.  Upper abdomen: Normal.      CV PROCEDURES: none          ASSESSMENT AND PLAN  1. Primary hypercholesterolemia  CRP HIGH SENSITIVE (CARDIAC)    Lipid Profile    Lipoprotein (a)    INSULIN FASTING    HEMOGLOBIN A1C    CORTISOL      2. Class 2 severe obesity with serious comorbidity and body mass index (BMI) of 35.0 to 35.9 in adult, unspecified obesity type (HCC)  CRP HIGH SENSITIVE (CARDIAC)    Lipid Profile    Lipoprotein (a)    INSULIN FASTING    HEMOGLOBIN A1C    CORTISOL      3. Prediabetes  CRP HIGH SENSITIVE (CARDIAC)    Lipid Profile    Lipoprotein (a)    INSULIN FASTING    HEMOGLOBIN A1C    CORTISOL      4. Acquired hypothyroidism        5. Family history of premature coronary artery disease          Patient Type: Primary Prevention    Major ASCVD events: None      Other established Vascular Disease: None    Evidence of genetic dyslipidemia: Possible   - likely polygenic HLD due to LDL-C 160-180s baseline  - no LDL >190, so very low prob HeFH   - remains unclear why she has progression from mild hyperlipidemia (<130) to severe (>160) over last several years     FH genotyping: NO    Secondary causes/contributors: yes, preDM, obesity presumed some metabolic syndrome with insulin resistance though HDL and TG levels are stable     ACC/AHA Indication for Statin Therapy:  does not qualify    ASCVD risk calculations  The 10-year ASCVD risk score (Waleska DK, et al., 2019) is: 1.7%, <5% \"low risk\"    SUAREZ risk score using 2020 data and current lipids   - baseline low risk per both calculators with \"de-risk\" based upon CACS = 0, which is currently our most powerful risk prediction tool           Other Significant Risk Markers:  High-risk conditions: N/A  Risk-enhancers: Famhx of premature ASCVD and Persistently elevated LDL-C >159  Lipoprotein(a): Ordered this visit    Goal LDL-C and " ApolipoproteinB/non-HDL-C:  LDL-C <100 mg/dl, though may consider <130 initially   apoB <90 mg/dl, though may consider <100  At goals? No, 4/2024, 6/2024     LIFESTYLE INTERVENTIONS - primary focus of treatment   TOBACCO:  reports that she has quit smoking. Her smoking use included cigarettes. She has never used smokeless tobacco.   - continued complete avoidance of all tobacco products   PHYSICAL ACTIVITY: at least 150 min per week of moderate intensity  NUTRITION: Mediterranean, Plant-based - increase nuts, beans, whole grains, plant protein for animal 1-2 times/week, Weight reduction - reduce caloric intake by 300 - 500 kcal/day to achieve 1-2lb weight loss per week , and - handout provided   ETOH: limit to 1 or less standard drinks/day  WT MGMT: focus on 5-7% reduction over time , 1kg loss = reduced 1 mmHg  - consider use of various wt reducing interventions and average wt loss potential:   Baseline diet/exercise (5-7+%)   Plus low intensity (phentermine, qsymia, contrave - 5-10+%)   And/or high-intensity meds (wegovy, zepbound - 10-15+%)   And/org wt-reducing surgery (20-30+%)  - suggested for ongoing f/u with PCP to review above    LIPID-LOWERING MEDICATION MANAGEMENT:     - as reviewed with patient, culmination of all variables into PCE and SUAREZ risk scores indicates low risk for 10yr ASCVD  - CAC = 0 outweighs the risk-enhancers per her famhx and LDL-C >160 baseline, so we focus on shared decision making on tx decisions and will focus on lifestyle for now   - she is aware that forthcoming labs and /or future CACS in next 1-3yrs may change our decision-making     Statin Therapy:   After risk assessment and discussion, through shared MDM, she is Not currently indicated for statin therapy     Non-Statin Meds:  none     PCSK9 Inhibitor strategy indications: Not currently indicated    Recommended supplements: consider OTC plant sterols/stanols and/or berberine      APHERESIS: n/a     BLOOD PRESSURE  MANAGEMENT  Office BP Goal ACC/AHA (2017) goal <130/80  Home BP at goal:  yes  Office BP at goal:  yes  24h ABPM:  not indicated  RDN candidate? N/a  Contributing factors: n/a   Plan:   - monitor annual office-based BP and/or intermittent at home BP, report >130/80    - continue healthy lifestyle  Medications: none      GLYCEMIC STATUS: Prediabetic w/o true MetS  Lab Results   Component Value Date    HBA1C 6.0 (A) 07/29/2024    Plan:  - continue healthy diet, weight reduction, daily physical activity   - consider metformin initiation up to 850mg BID if A1c 6.2+ in future to reduce T2D progression  - monitor labs Q6-12mo     ANTITHROMBOTIC THERAPY Not currently recommended    OTHER    # hypothyroidism - current high TSH suggesting uncontrolled hypothyroidism    - fu with PCP for dosage adjustments and lab surveillance   - suggested for TSH 1.5 as optimal range to reduce impact on lipids and BP    Studies repeat CACS 5-7yrs from prior (2025-27)  Labs: as noted above  Follow-Up: RTC in 3 months    Evelio Ortiz M.D.  [facility], Board-certified clinical lipidologist   [facility]   [facility]   [phone]      CC:  CINDI Espinoza Jasleen, M.D.

## 2024-09-05 DIAGNOSIS — E03.9 ACQUIRED HYPOTHYROIDISM: Chronic | ICD-10-CM

## 2024-09-05 PROBLEM — E66.01 CLASS 2 SEVERE OBESITY WITH SERIOUS COMORBIDITY AND BODY MASS INDEX (BMI) OF 35.0 TO 35.9 IN ADULT, UNSPECIFIED OBESITY TYPE (HCC): Status: ACTIVE | Noted: 2024-09-05

## 2024-09-05 PROBLEM — Z82.49 FAMILY HISTORY OF PREMATURE CORONARY ARTERY DISEASE: Status: ACTIVE | Noted: 2024-09-05

## 2024-09-05 PROBLEM — E66.812 CLASS 2 SEVERE OBESITY WITH SERIOUS COMORBIDITY AND BODY MASS INDEX (BMI) OF 35.0 TO 35.9 IN ADULT, UNSPECIFIED OBESITY TYPE (HCC): Status: ACTIVE | Noted: 2024-09-05

## 2024-09-05 RX ORDER — LEVOTHYROXINE SODIUM 125 UG/1
125 TABLET ORAL
Qty: 90 TABLET | Refills: 0 | Status: SHIPPED | OUTPATIENT
Start: 2024-09-05 | End: 2024-09-05

## 2024-09-05 RX ORDER — LEVOTHYROXINE SODIUM 125 UG/1
125 TABLET ORAL
Qty: 90 TABLET | Refills: 0 | Status: SHIPPED | OUTPATIENT
Start: 2024-09-05

## 2024-10-02 ENCOUNTER — APPOINTMENT (OUTPATIENT)
Dept: INTERNAL MEDICINE | Facility: OTHER | Age: 59
End: 2024-10-02
Payer: COMMERCIAL

## 2024-10-08 ENCOUNTER — PATIENT MESSAGE (OUTPATIENT)
Dept: MEDICAL GROUP | Facility: PHYSICIAN GROUP | Age: 59
End: 2024-10-08
Payer: COMMERCIAL

## 2024-10-08 DIAGNOSIS — F33.0 MILD EPISODE OF RECURRENT MAJOR DEPRESSIVE DISORDER (HCC): ICD-10-CM

## 2024-10-08 DIAGNOSIS — J30.2 SEASONAL ALLERGIES: ICD-10-CM

## 2024-10-09 ENCOUNTER — HOSPITAL ENCOUNTER (OUTPATIENT)
Dept: LAB | Facility: MEDICAL CENTER | Age: 59
End: 2024-10-09
Attending: STUDENT IN AN ORGANIZED HEALTH CARE EDUCATION/TRAINING PROGRAM
Payer: COMMERCIAL

## 2024-10-09 PROCEDURE — 84439 ASSAY OF FREE THYROXINE: CPT

## 2024-10-09 PROCEDURE — 36415 COLL VENOUS BLD VENIPUNCTURE: CPT

## 2024-10-09 PROCEDURE — 84443 ASSAY THYROID STIM HORMONE: CPT

## 2024-10-10 LAB
T4 FREE SERPL-MCNC: 1.39 NG/DL (ref 0.93–1.7)
TSH SERPL-ACNC: 6.06 UIU/ML (ref 0.35–5.5)

## 2024-10-16 DIAGNOSIS — E03.9 ACQUIRED HYPOTHYROIDISM: Chronic | ICD-10-CM

## 2024-10-16 RX ORDER — MONTELUKAST SODIUM 10 MG/1
10 TABLET ORAL DAILY
Qty: 90 TABLET | Refills: 3 | Status: SHIPPED | OUTPATIENT
Start: 2024-10-16

## 2024-10-16 RX ORDER — ESCITALOPRAM OXALATE 20 MG/1
TABLET ORAL
Qty: 90 TABLET | Refills: 3 | Status: SHIPPED | OUTPATIENT
Start: 2024-10-16

## 2024-10-16 RX ORDER — LEVOTHYROXINE SODIUM 137 UG/1
137 TABLET ORAL
Qty: 90 TABLET | Refills: 0 | Status: SHIPPED | OUTPATIENT
Start: 2024-10-16 | End: 2024-10-17 | Stop reason: SDUPTHER

## 2024-10-17 ENCOUNTER — APPOINTMENT (RX ONLY)
Dept: URBAN - METROPOLITAN AREA CLINIC 22 | Facility: CLINIC | Age: 59
Setting detail: DERMATOLOGY
End: 2024-10-17

## 2024-10-17 DIAGNOSIS — L72.0 EPIDERMAL CYST: ICD-10-CM

## 2024-10-17 DIAGNOSIS — L81.4 OTHER MELANIN HYPERPIGMENTATION: ICD-10-CM

## 2024-10-17 DIAGNOSIS — D22 MELANOCYTIC NEVI: ICD-10-CM

## 2024-10-17 DIAGNOSIS — D18.0 HEMANGIOMA: ICD-10-CM

## 2024-10-17 DIAGNOSIS — Z71.89 OTHER SPECIFIED COUNSELING: ICD-10-CM

## 2024-10-17 DIAGNOSIS — E03.9 ACQUIRED HYPOTHYROIDISM: Chronic | ICD-10-CM

## 2024-10-17 PROBLEM — D22.72 MELANOCYTIC NEVI OF LEFT LOWER LIMB, INCLUDING HIP: Status: ACTIVE | Noted: 2024-10-17

## 2024-10-17 PROBLEM — D22.61 MELANOCYTIC NEVI OF RIGHT UPPER LIMB, INCLUDING SHOULDER: Status: ACTIVE | Noted: 2024-10-17

## 2024-10-17 PROBLEM — D18.01 HEMANGIOMA OF SKIN AND SUBCUTANEOUS TISSUE: Status: ACTIVE | Noted: 2024-10-17

## 2024-10-17 PROBLEM — D22.71 MELANOCYTIC NEVI OF RIGHT LOWER LIMB, INCLUDING HIP: Status: ACTIVE | Noted: 2024-10-17

## 2024-10-17 PROBLEM — D48.5 NEOPLASM OF UNCERTAIN BEHAVIOR OF SKIN: Status: ACTIVE | Noted: 2024-10-17

## 2024-10-17 PROBLEM — D22.5 MELANOCYTIC NEVI OF TRUNK: Status: ACTIVE | Noted: 2024-10-17

## 2024-10-17 PROCEDURE — ? OBSERVATION

## 2024-10-17 PROCEDURE — 99213 OFFICE O/P EST LOW 20 MIN: CPT

## 2024-10-17 PROCEDURE — ? COUNSELING

## 2024-10-17 PROCEDURE — ? SUNSCREEN RECOMMENDATIONS

## 2024-10-17 RX ORDER — LEVOTHYROXINE SODIUM 137 UG/1
137 TABLET ORAL
Qty: 30 TABLET | Refills: 1 | Status: SHIPPED | OUTPATIENT
Start: 2024-10-17

## 2024-10-17 ASSESSMENT — LOCATION ZONE DERM
LOCATION ZONE: LEG
LOCATION ZONE: ARM
LOCATION ZONE: FACE
LOCATION ZONE: TRUNK

## 2024-10-17 ASSESSMENT — LOCATION SIMPLE DESCRIPTION DERM
LOCATION SIMPLE: UPPER BACK
LOCATION SIMPLE: RIGHT PRETIBIAL REGION
LOCATION SIMPLE: RIGHT LOWER BACK
LOCATION SIMPLE: LEFT UPPER BACK
LOCATION SIMPLE: ABDOMEN
LOCATION SIMPLE: RIGHT UPPER ARM
LOCATION SIMPLE: INFERIOR FOREHEAD
LOCATION SIMPLE: LEFT PRETIBIAL REGION
LOCATION SIMPLE: LEFT FOREARM
LOCATION SIMPLE: LEFT THIGH
LOCATION SIMPLE: RIGHT FOREARM
LOCATION SIMPLE: RIGHT THIGH
LOCATION SIMPLE: LEFT UPPER ARM

## 2024-10-17 ASSESSMENT — LOCATION DETAILED DESCRIPTION DERM
LOCATION DETAILED: RIGHT ANTERIOR PROXIMAL THIGH
LOCATION DETAILED: LEFT DISTAL DORSAL FOREARM
LOCATION DETAILED: LEFT VENTRAL PROXIMAL FOREARM
LOCATION DETAILED: EPIGASTRIC SKIN
LOCATION DETAILED: LEFT SUPERIOR UPPER BACK
LOCATION DETAILED: RIGHT SUPERIOR MEDIAL MIDBACK
LOCATION DETAILED: RIGHT DISTAL DORSAL FOREARM
LOCATION DETAILED: LEFT ANTERIOR PROXIMAL UPPER ARM
LOCATION DETAILED: LEFT PROXIMAL PRETIBIAL REGION
LOCATION DETAILED: SUPERIOR THORACIC SPINE
LOCATION DETAILED: RIGHT VENTRAL PROXIMAL FOREARM
LOCATION DETAILED: RIGHT PROXIMAL PRETIBIAL REGION
LOCATION DETAILED: RIGHT ANTERIOR PROXIMAL UPPER ARM
LOCATION DETAILED: LEFT ANTERIOR PROXIMAL THIGH
LOCATION DETAILED: INFERIOR MID FOREHEAD

## 2024-10-17 NOTE — PROCEDURE: SUNSCREEN RECOMMENDATIONS
Products Recommended: Lele Mckeon \\nElta md UV clear
Detail Level: Zone
General Sunscreen Counseling: I recommended a broad spectrum sunscreen with a SPF of 30 or higher.  I explained that SPF 30 sunscreens block approximately 97 percent of the sun's harmful rays.  Sunscreens should be applied at least 15 minutes prior to expected sun exposure and then every 2 hours after that as long as sun exposure continues. If swimming or exercising sunscreen should be reapplied every 45 minutes to an hour after getting wet or sweating.  One ounce, or the equivalent of a shot glass full of sunscreen, is adequate to protect the skin not covered by a bathing suit. I also recommended a lip balm with a sunscreen as well. Sun protective clothing can be used in lieu of sunscreen but must be worn the entire time you are exposed to the sun's rays.

## 2024-11-18 ENCOUNTER — HOSPITAL ENCOUNTER (OUTPATIENT)
Dept: LAB | Facility: MEDICAL CENTER | Age: 59
End: 2024-11-18
Attending: FAMILY MEDICINE
Payer: COMMERCIAL

## 2024-11-18 DIAGNOSIS — E66.812 CLASS 2 OBESITY WITHOUT SERIOUS COMORBIDITY WITH BODY MASS INDEX (BMI) OF 35.0 TO 35.9 IN ADULT, UNSPECIFIED OBESITY TYPE: ICD-10-CM

## 2024-11-18 DIAGNOSIS — E78.00 PRIMARY HYPERCHOLESTEROLEMIA: Chronic | ICD-10-CM

## 2024-11-18 DIAGNOSIS — R73.03 PREDIABETES: Chronic | ICD-10-CM

## 2024-11-18 LAB
CHOLEST SERPL-MCNC: 215 MG/DL (ref 100–199)
CORTIS SERPL-MCNC: 11.4 UG/DL (ref 0–23)
CRP SERPL HS-MCNC: 2.9 MG/L (ref 0–3)
EST. AVERAGE GLUCOSE BLD GHB EST-MCNC: 128 MG/DL
HBA1C MFR BLD: 6.1 % (ref 4–5.6)
HDLC SERPL-MCNC: 54 MG/DL
LDLC SERPL CALC-MCNC: 140 MG/DL
TRIGL SERPL-MCNC: 107 MG/DL (ref 0–149)

## 2024-11-18 PROCEDURE — 82533 TOTAL CORTISOL: CPT

## 2024-11-18 PROCEDURE — 83036 HEMOGLOBIN GLYCOSYLATED A1C: CPT

## 2024-11-18 PROCEDURE — 86141 C-REACTIVE PROTEIN HS: CPT

## 2024-11-18 PROCEDURE — 83695 ASSAY OF LIPOPROTEIN(A): CPT

## 2024-11-18 PROCEDURE — 80061 LIPID PANEL: CPT

## 2024-11-18 PROCEDURE — 83525 ASSAY OF INSULIN: CPT

## 2024-11-18 PROCEDURE — 36415 COLL VENOUS BLD VENIPUNCTURE: CPT

## 2024-11-19 LAB
INSULIN P FAST SERPL-ACNC: 8 UIU/ML (ref 3–25)
LPA SERPL-MCNC: 9 MG/DL

## 2024-11-21 ENCOUNTER — HOSPITAL ENCOUNTER (OUTPATIENT)
Dept: LAB | Facility: MEDICAL CENTER | Age: 59
End: 2024-11-21
Attending: INTERNAL MEDICINE
Payer: COMMERCIAL

## 2024-11-21 LAB
T3FREE SERPL-MCNC: 2.61 PG/ML (ref 2–4.4)
T4 FREE SERPL-MCNC: 1.46 NG/DL (ref 0.93–1.7)
TSH SERPL DL<=0.005 MIU/L-ACNC: 2.13 UIU/ML (ref 0.38–5.33)

## 2024-11-21 PROCEDURE — 84439 ASSAY OF FREE THYROXINE: CPT

## 2024-11-21 PROCEDURE — 84481 FREE ASSAY (FT-3): CPT

## 2024-11-21 PROCEDURE — 84443 ASSAY THYROID STIM HORMONE: CPT

## 2024-11-21 PROCEDURE — 36415 COLL VENOUS BLD VENIPUNCTURE: CPT

## 2024-11-26 ENCOUNTER — APPOINTMENT (OUTPATIENT)
Dept: INTERNAL MEDICINE | Facility: OTHER | Age: 59
End: 2024-11-26
Payer: COMMERCIAL

## 2024-12-04 ENCOUNTER — OFFICE VISIT (OUTPATIENT)
Dept: VASCULAR LAB | Facility: MEDICAL CENTER | Age: 59
End: 2024-12-04
Attending: FAMILY MEDICINE
Payer: COMMERCIAL

## 2024-12-04 VITALS
DIASTOLIC BLOOD PRESSURE: 73 MMHG | SYSTOLIC BLOOD PRESSURE: 112 MMHG | BODY MASS INDEX: 35.36 KG/M2 | HEART RATE: 68 BPM | WEIGHT: 220 LBS | HEIGHT: 66 IN

## 2024-12-04 DIAGNOSIS — E88.819 INSULIN RESISTANCE: Chronic | ICD-10-CM

## 2024-12-04 DIAGNOSIS — E66.812 CLASS 2 OBESITY WITHOUT SERIOUS COMORBIDITY WITH BODY MASS INDEX (BMI) OF 35.0 TO 35.9 IN ADULT, UNSPECIFIED OBESITY TYPE: ICD-10-CM

## 2024-12-04 DIAGNOSIS — E78.00 PRIMARY HYPERCHOLESTEROLEMIA: ICD-10-CM

## 2024-12-04 DIAGNOSIS — R73.03 PREDIABETES: Chronic | ICD-10-CM

## 2024-12-04 DIAGNOSIS — Z82.49 FAMILY HISTORY OF PREMATURE CORONARY ARTERY DISEASE: ICD-10-CM

## 2024-12-04 DIAGNOSIS — E03.9 ACQUIRED HYPOTHYROIDISM: ICD-10-CM

## 2024-12-04 DIAGNOSIS — Z91.89 OTHER SPECIFIED PERSONAL RISK FACTORS, NOT ELSEWHERE CLASSIFIED: ICD-10-CM

## 2024-12-04 PROCEDURE — 99212 OFFICE O/P EST SF 10 MIN: CPT

## 2024-12-04 PROCEDURE — 3078F DIAST BP <80 MM HG: CPT | Performed by: FAMILY MEDICINE

## 2024-12-04 PROCEDURE — 99214 OFFICE O/P EST MOD 30 MIN: CPT | Performed by: FAMILY MEDICINE

## 2024-12-04 PROCEDURE — 3074F SYST BP LT 130 MM HG: CPT | Performed by: FAMILY MEDICINE

## 2024-12-04 RX ORDER — PHENTERMINE HYDROCHLORIDE 37.5 MG/1
37.5 TABLET ORAL DAILY
COMMUNITY
Start: 2024-11-21

## 2024-12-04 RX ORDER — LIOTHYRONINE SODIUM 5 UG/1
TABLET ORAL
COMMUNITY
Start: 2024-11-21

## 2024-12-04 ASSESSMENT — ENCOUNTER SYMPTOMS
PND: 0
HEMOPTYSIS: 0
COUGH: 0
CHILLS: 0
SPUTUM PRODUCTION: 0
FEVER: 0
SHORTNESS OF BREATH: 0
WHEEZING: 0
CLAUDICATION: 0
ORTHOPNEA: 0
PALPITATIONS: 0

## 2024-12-04 ASSESSMENT — FIBROSIS 4 INDEX: FIB4 SCORE: 1.153558052434456929

## 2024-12-04 NOTE — PROGRESS NOTES
FOLLOW-UP [facility] LIPID [facility]INIC   12/04/24     Ruby Sue , referred for evaluation and mgmt of dyslipidemia, Est 9/2024   Referral Source: Ashanti Figueroa M.D.     Subjective      Interval hx/concerns: last seen 9/2024, had interval labs.  Feeling well.  Has seen endocrine MD and no med adjustments.     CURRENT MED MGMT  Current Lipid Lowering Meds:   Statin: None  Non-Statin: None  Supplements: None  Current adverse drug reactions/side effects? N\A  Adherence? N\A    LIFESTYLE MGMT  Change in weight: Stable  Exercise habits: moderate regular exercise program  Dietary patterns: protein powder, chicken, veg, eggs for breakfast, no pre-packaged foods   Etoh: see sochx  Barriers to care/SDOH: none  Tobacco:  reports that she has quit smoking. Her smoking use included cigarettes. She has never used smokeless tobacco.     PERTINENT HLD PMHX  Initial visit history: seen by PCP in 7/2024 and noted to have ongoing high LDL-C and high ApoB.  Had prior CACS = 0 in 2020.  Here to review treatment options in light of fhx of ASCVD.     Age at Initial Diagnosis of Dyslipidemia: early 50s     Baseline Lipids Prior to Treatment:   Lab Results   Component Value Date/Time    CHOLSTRLTOT 246 (H) 04/19/2024 09:49 AM     (H) 04/19/2024 09:49 AM    HDL 58 04/19/2024 09:49 AM    TRIGLYCERIDE 71 04/19/2024 09:49 AM       History of ASCVD: None  Other Established Vascular Disease: None  Previously Attempted Interventions for Lipids - including outcome  Statin: none   Outcome: not applicable  Non-Statin: none  Outcome: not applicable    OTHER CV RISK FACTORS:  HTN: no prior dx or meds    Dysglycemia: yes, preDM, no formal T2D dx in past   Antithrombotic tx: no      Current Outpatient Medications on File Prior to Visit   Medication Sig Dispense Refill    phentermine (ADIPEX-P) 37.5 MG tablet Take 37.5 mg by mouth every day.      liothyronine (CYTOMEL) 5 MCG Tab TAKE 1 TABLET BY MOUTH DAILY ON AN EMPTY STOMACH       levothyroxine (SYNTHROID) 137 MCG Tab Take 1 Tablet by mouth every morning on an empty stomach. 30 Tablet 1    escitalopram (LEXAPRO) 20 MG tablet TAKE ONE TABLET BY MOUTH DAILY 90 Tablet 3    montelukast (SINGULAIR) 10 MG Tab Take 1 Tablet by mouth every day. 90 Tablet 3    meloxicam (MOBIC) 15 MG tablet Take 1 Tablet by mouth every day. 30 Tablet 1    Nutritional Supplements (ESTROVEN PO) Take  by mouth.      albuterol (VENTOLIN HFA) 108 (90 Base) MCG/ACT Aero Soln inhalation aerosol Inhale 2 Puffs every four hours as needed for Shortness of Breath. 3 Each 3     No current facility-administered medications on file prior to visit.      Allergies   Allergen Reactions    Cleocin [Clindamycin Hcl]     Pcn [Penicillins]       Family History   Problem Relation Age of Onset    Diabetes Mother 50        T2    Heart Disease Mother 60        PCI few times, MI    Transient ischemic attack Father 81    Cancer Father         skin, non-melanoma    Cancer Sister         skin, melanoma    Hyperlipidemia Sister     Heart Disease Sister         elevated CACS    No Known Problems Brother     Heart Disease Maternal Grandmother 58         - MIs, valvular dz    Stroke Maternal Grandmother     Asthma Maternal Grandfather     Cancer Paternal Grandmother         lung, former smoker    No Known Problems Daughter     Asthma Daughter     No Known Problems Daughter     No Known Problems Daughter     Breast Cancer Neg Hx     Colorectal Cancer Neg Hx     Peritoneal Cancer Neg Hx     Tubal Cancer Neg Hx     Ovarian Cancer Neg Hx      Social History     Tobacco Use    Smoking status: Former     Types: Cigarettes    Smokeless tobacco: Never    Tobacco comments:     Smoked on/off for 10-15 years, 1/2-1 ppd   Vaping Use    Vaping status: Never Used   Substance Use Topics    Alcohol use: Yes     Alcohol/week: 1.2 oz     Types: 2 Glasses of wine per week    Drug use: No     Review of Systems   Constitutional:  Negative for chills, fever and  "malaise/fatigue.   Respiratory:  Negative for cough, hemoptysis, sputum production, shortness of breath and wheezing.    Cardiovascular:  Negative for chest pain, palpitations, orthopnea, claudication, leg swelling and PND.         Objective    Vitals:    12/04/24 0841   BP: 112/73   BP Location: Left arm   Patient Position: Sitting   BP Cuff Size: Large adult   Pulse: 68   Weight: 99.8 kg (220 lb)   Height: 1.676 m (5' 6\")     BP Readings from Last 5 Encounters:   12/04/24 112/73   09/04/24 103/76   07/29/24 118/68   04/24/24 128/64   03/26/24 128/68     BMI Readings from Last 1 Encounters:   12/04/24 35.51 kg/m²     Wt Readings from Last 3 Encounters:   12/04/24 99.8 kg (220 lb)   09/04/24 98.9 kg (218 lb)   07/29/24 98.9 kg (218 lb)     Physical Exam  Constitutional:       General: She is not in acute distress.     Appearance: Normal appearance. She is not diaphoretic.   HENT:      Head: Normocephalic and atraumatic.   Eyes:      Conjunctiva/sclera: Conjunctivae normal.   Cardiovascular:      Rate and Rhythm: Normal rate and regular rhythm.      Pulses:           Carotid pulses are 2+ on the right side and 2+ on the left side.       Radial pulses are 2+ on the right side and 2+ on the left side.        Dorsalis pedis pulses are 2+ on the right side and 2+ on the left side.        Posterior tibial pulses are 2+ on the right side and 2+ on the left side.      Heart sounds: Normal heart sounds.   Pulmonary:      Effort: Pulmonary effort is normal.      Breath sounds: Normal breath sounds.   Musculoskeletal:      Right lower leg: No edema.      Left lower leg: No edema.   Skin:     General: Skin is warm and dry.   Neurological:      Mental Status: She is alert and oriented to person, place, and time.      Cranial Nerves: No cranial nerve deficit.      Gait: Gait is intact.   Psychiatric:         Mood and Affect: Mood and affect normal.       DATA REVIEW:  Most Recent Lipid Panel:   Lab Results   Component Value " Date/Time    CHOLSTRLTOT 215 (H) 2024 07:50 AM     (H) 2024 07:50 AM    HDL 54 2024 07:50 AM    TRIGLYCERIDE 107 2024 07:50 AM     Lab Results   Component Value Date/Time     (H) 2024 07:50 AM     (H) 2024 09:49 AM     (H) 2023 07:39 AM     (H) 2021 11:08 AM     (H) 2020 08:05 AM      Lab Results   Component Value Date/Time    LIPOPROTA 9 2024 07:50 AM      Lab Results   Component Value Date/Time    APOB 123 (H) 2024 08:26 AM      Lab Results   Component Value Date/Time    CRPHIGHSEN 2.9 2024 07:50 AM       Other Pertinent Blood Work:   Lab Results   Component Value Date    SODIUM 140 2024    POTASSIUM 4.8 2024    CHLORIDE 105 2024    CO2 24 2024    ANION 11.0 2024    GLUCOSE 108 (H) 2024    BUN 17 2024    CREATININE 0.70 2024    CALCIUM 9.1 2024    ASTSGOT 22 2024    ALTSGPT 16 2024    ALKPHOSPHAT 58 2024    TBILIRUBIN 0.6 2024    ALBUMIN 4.5 2024    AGRATIO 1.7 2024    TSHULTRASEN 2.130 2024       IMAGIN/2020 CACS  Coronary calcification:  LMA - 0.0  LCX - 0.0  LAD - 0.0  RCA - 0.0  PDA - 0.0   Total Calcium Score: 0.0   Percentile: Calcium score is below the 75th percentile for the patient's age and sex.  Other findings:  Heart: Normal size.  Lungs: Clear.  Mediastinum: Normal.  Upper abdomen: Normal.        PROCEDURES: none          ASSESSMENT AND PLAN  1. Primary hypercholesterolemia  Comp Metabolic Panel    Lipid Profile      2. Class 2 severe obesity with serious comorbidity and body mass index (BMI) of 35.0 to 35.9 in adult, unspecified obesity type (HCC)        3. Prediabetes  HEMOGLOBIN A1C    Comp Metabolic Panel      4. Insulin resistance  INSULIN FASTING      5. Acquired hypothyroidism        6. Family history of premature coronary artery disease        7. Other specified personal risk  "factors, not elsewhere classified  CT-CARDIAC SCORING        Patient Type: Primary Prevention    Major ASCVD events: None      Other established Vascular Disease: None    Evidence of genetic dyslipidemia: Possible   - likely polygenic HLD due to LDL-C 160-180s baseline (primary HLD)  - no LDL >190, so very low prob HeFH   - remains unclear why she has progression from mild hyperlipidemia (<130) to severe (>160) over last several years     FH genotyping: NO    Secondary causes/contributors: yes, preDM, obesity presumed some metabolic syndrome with insulin resistance though HDL and TG levels are stable   Endocrine/Hypothyroidism:    hypothyroidism - currently high TSH, on meds  preDM - presumed insulin resistance     ACC/AHA Indication for Statin Therapy: does not qualify    ASCVD risk calculations  PCE: The 10-year ASCVD risk score (Waleska DK, et al., 2019) is: 1.9%, <5% \"low risk\"    SUAREZ risk score using 2020 data and current lipids   - baseline low risk per both calculators with \"de-risk\" based upon CACS = 0, which is currently our most powerful risk prediction tool           Other Significant Risk Markers:  High-risk conditions: N/A  Risk-enhancers: Famhx of premature ASCVD and Persistently elevated LDL-C >159  Lipoprotein(a): Favorable (<30 mg/dl or <75 nmol/L    Goal LDL-C and ApolipoproteinB/non-HDL-C:  LDL-C <100 mg/dl, though may consider <130 initially   apoB <90 mg/dl, though may consider <100  At goals? No, 4/2024, 6/2024, 12/2024    Has achieved 19% LDL-C reduction since last visit,  needs additional 29% LDL-C reduction to hit goal     LIFESTYLE INTERVENTIONS - primary focus of treatment   TOBACCO:  reports that she has quit smoking. Her smoking use included cigarettes. She has never used smokeless tobacco.   - continued complete avoidance of all tobacco products   PHYSICAL ACTIVITY: at least 150 min per week of moderate intensity  NUTRITION: Mediterranean, Plant-based - increase nuts, beans, whole " grains, plant protein for animal 1-2 times/week, Weight reduction - reduce caloric intake by 300 - 500 kcal/day to achieve 1-2lb weight loss per week , and - handout provided   ETOH: limit to 1 or less standard drinks/day  WT MGMT: focus on 5-7% reduction over time , 1kg loss = reduced 1 mmHg  - consider use of various wt reducing interventions and average wt loss potential:   Baseline diet/exercise (5-7+%)   Plus low intensity (pending phentermine - 5-10+%)   And/or high-intensity meds (wegovy, zepbound - 10-15+%)   And/org wt-reducing surgery (20-30+%)  - suggested for ongoing f/u with PCP to review above    LIPID-LOWERING MEDICATION MANAGEMENT:     - as reviewed with patient, culmination of all variables into PCE and SUAREZ risk scores indicates low risk for 10yr ASCVD  - CAC = 0 outweighs the risk-enhancers per her famhx and LDL-C >160 baseline, so we focus on shared decision making on tx decisions and will focus on lifestyle for now   - she is aware that forthcoming labs and /or future CACS in next 1-3yrs may change our decision-making     Statin Therapy:   After risk assessment and discussion, through shared MDM, she is Not currently indicated for statin therapy     Non-Statin Meds:  none     PCSK9 Inhibitor strategy indications: Not currently indicated    Recommended supplements: consider OTC plant sterols/stanols and/or berberine      APHERESIS: n/a     BLOOD PRESSURE MANAGEMENT  Office BP Goal ACC/AHA (2017) goal <130/80  Home BP at goal:  yes  Office BP at goal:  yes  24h ABPM:  not indicated  RDN candidate? N/a  Contributing factors: n/a   Plan:   - monitor annual office-based BP and/or intermittent at home BP, report >130/80    - continue healthy lifestyle  Medications: none      GLYCEMIC STATUS: Prediabetic w/o true MetS  MARTHA-IR = 2.1 indicating early IR   Lab Results   Component Value Date    HBA1C 6.1 (H) 11/18/2024    Plan:  - continue healthy diet, weight reduction, daily physical activity   -  consider metformin initiation up to 850mg BID if A1c 6.2+ in future to reduce T2D progression  - monitor labs Q6-12mo     ANTITHROMBOTIC THERAPY Not currently recommended    OTHER    # hypothyroidism - current high TSH suggesting uncontrolled hypothyroidism    - fu with PCP for dosage adjustments and lab surveillance   - suggested for TSH 1.5 as optimal range to reduce impact on lipids and BP    Studies repeat CACS prior to next visit    Labs: as noted above  Follow-Up: 6 months    Evelio Ortiz M.D.  RIRI, Board-certified clinical lipidologist   [facility]   [facility]   [phone]

## 2024-12-05 ENCOUNTER — HOSPITAL ENCOUNTER (OUTPATIENT)
Dept: LAB | Facility: MEDICAL CENTER | Age: 59
End: 2024-12-05
Attending: STUDENT IN AN ORGANIZED HEALTH CARE EDUCATION/TRAINING PROGRAM
Payer: COMMERCIAL

## 2024-12-05 LAB
T4 FREE SERPL-MCNC: 1.4 NG/DL (ref 0.93–1.7)
TSH SERPL-ACNC: 2.64 UIU/ML (ref 0.35–5.5)

## 2024-12-05 PROCEDURE — 36415 COLL VENOUS BLD VENIPUNCTURE: CPT

## 2024-12-05 PROCEDURE — 84443 ASSAY THYROID STIM HORMONE: CPT

## 2024-12-05 PROCEDURE — 84439 ASSAY OF FREE THYROXINE: CPT

## 2024-12-15 DIAGNOSIS — E03.9 ACQUIRED HYPOTHYROIDISM: Chronic | ICD-10-CM

## 2024-12-15 RX ORDER — LEVOTHYROXINE SODIUM 150 UG/1
150 TABLET ORAL
Qty: 30 TABLET | Refills: 1 | Status: SHIPPED | OUTPATIENT
Start: 2024-12-15 | End: 2024-12-16

## 2024-12-16 DIAGNOSIS — E03.9 ACQUIRED HYPOTHYROIDISM: Chronic | ICD-10-CM

## 2024-12-16 RX ORDER — LEVOTHYROXINE SODIUM 137 UG/1
137 TABLET ORAL
Qty: 90 TABLET | Refills: 3
Start: 2024-12-16

## 2025-01-06 ENCOUNTER — OFFICE VISIT (OUTPATIENT)
Dept: MEDICAL GROUP | Facility: PHYSICIAN GROUP | Age: 60
End: 2025-01-06
Payer: COMMERCIAL

## 2025-01-06 DIAGNOSIS — F33.41 RECURRENT MAJOR DEPRESSIVE DISORDER, IN PARTIAL REMISSION (HCC): Chronic | ICD-10-CM

## 2025-01-06 DIAGNOSIS — Z63.0 MARITAL CONFLICT: ICD-10-CM

## 2025-01-06 DIAGNOSIS — Z12.31 ENCOUNTER FOR SCREENING MAMMOGRAM FOR MALIGNANT NEOPLASM OF BREAST: ICD-10-CM

## 2025-01-06 DIAGNOSIS — E66.9 OBESITY (BMI 35.0-39.9 WITHOUT COMORBIDITY): Chronic | ICD-10-CM

## 2025-01-06 DIAGNOSIS — E78.2 MIXED HYPERLIPIDEMIA: Chronic | ICD-10-CM

## 2025-01-06 PROCEDURE — 99214 OFFICE O/P EST MOD 30 MIN: CPT | Performed by: STUDENT IN AN ORGANIZED HEALTH CARE EDUCATION/TRAINING PROGRAM

## 2025-01-06 PROCEDURE — 3078F DIAST BP <80 MM HG: CPT | Performed by: STUDENT IN AN ORGANIZED HEALTH CARE EDUCATION/TRAINING PROGRAM

## 2025-01-06 PROCEDURE — 3074F SYST BP LT 130 MM HG: CPT | Performed by: STUDENT IN AN ORGANIZED HEALTH CARE EDUCATION/TRAINING PROGRAM

## 2025-01-06 SDOH — SOCIAL STABILITY - SOCIAL INSECURITY: PROBLEMS IN RELATIONSHIP WITH SPOUSE OR PARTNER: Z63.0

## 2025-01-06 ASSESSMENT — FIBROSIS 4 INDEX: FIB4 SCORE: 1.17

## 2025-01-06 NOTE — PROGRESS NOTES
"Subjective:     Chief Complaint   Patient presents with    Follow-Up       History of Present Illness  The patient presents for evaluation of weight management and marital issues.    She has been under the care of an endocrinologist since 11/2024, who initiated a regimen of phentermine, starting with half a tablet. The dosage was subsequently increased to a full tablet and she is taking 37.5 mg daily since yesterday. BMI 35.26 today and weight 218 lbs. She reports weight at home was 213 lbs and weight has been fluctuating. She has discontinued her exercise routine, which previously included weightlifting and cardio 4 to 5 times per week. She is not currently consulting with a nutritionist. She expresses interest in utilizing a continuous glucose monitor (CGM) to identify potential dietary triggers for her weight gain.    She reports experiencing sleep disturbances, which she attributes to marital issues with her , who has a history of alcohol abuse. They have been  for nearly 27 years, and she is considering divorce. She is seeking guidance on potential therapeutic interventions for her , as she does not believe he requires detoxification. She recalls a period of improved marital relations following couple's therapy, but they only attended two sessions. She is currently taking Lexapro 20 mg daily and denies depression. She is agreeable to see a therapist to discuss marital issues.          Health Maintenance: Completed    ROS:  Negative except as stated above.      Objective:     Exam:  /70   Temp 37.1 °C (98.7 °F) (Temporal)   Ht 1.676 m (5' 6\")   Wt 99.1 kg (218 lb 7.6 oz)   SpO2 98%   BMI 35.26 kg/m²  Body mass index is 35.26 kg/m².    Physical Exam    Gen: Alert and oriented, no acute distress.  Lungs: Normal effort, CTAB, no wheezing / rhonchi / rales.  CV: RRR, normal S1 and S2, no murmurs.      Assessment & Plan:     59 y.o. female with the following -     1. Marital " conflict  Chronic, uncontrolled.  Due to 's alcohol issues.  He has an appointment tomorrow and we will discuss this further.  She was given a referral for therapy.  - Referral to Behavioral Health    2. Recurrent major depressive disorder, in partial remission (HCC)  Chronic, stable.  Continue lexapro 20 mg daily.  - Referral to Behavioral Health    3. Mixed hyperlipidemia  Chronic, uncontrolled.  She follows up Cardiology.  Nov 2024  and .  Jan 2020 coronary calcium score 0 and repeat cardiac CT ordered by Cardiology.  No medication currently.    4. Obesity (BMI 35.0-39.9 without comorbidity)  Chronic, uncontrolled.  BMI 35.26.  Endocrinology started phentermine.  PDMP reviewed and last filled 11/21/24 #90.  Continue phentermine 37.5 mg daily.  Encouraged to resume healthy diet and regular exercise.    5. Encounter for screening mammogram for malignant neoplasm of breast  - MA-SCREENING MAMMO BILAT W/TOMOSYNTHESIS W/CAD; Future          I spent a total of 35 minutes with record review, exam, communication with the patient, communication with other providers, and documentation of this encounter.      Return in about 6 months (around 7/6/2025) for Follow-up of chronic conditions.    Verbal consent was acquired by the patient to use [employer] ambient listening note generation during this visit: Yes.    Please note that this dictation was created using voice recognition software. I have made every reasonable attempt to correct obvious errors, but I expect that there are errors of grammar and possibly content that I did not discover before finalizing the note.

## 2025-01-07 VITALS
TEMPERATURE: 98.7 F | HEART RATE: 78 BPM | BODY MASS INDEX: 35.11 KG/M2 | OXYGEN SATURATION: 98 % | HEIGHT: 66 IN | DIASTOLIC BLOOD PRESSURE: 70 MMHG | SYSTOLIC BLOOD PRESSURE: 116 MMHG | WEIGHT: 218.48 LBS

## 2025-01-09 ENCOUNTER — PATIENT MESSAGE (OUTPATIENT)
Dept: MEDICAL GROUP | Facility: PHYSICIAN GROUP | Age: 60
End: 2025-01-09
Payer: COMMERCIAL

## 2025-01-09 DIAGNOSIS — F33.0 MILD EPISODE OF RECURRENT MAJOR DEPRESSIVE DISORDER (HCC): ICD-10-CM

## 2025-01-09 DIAGNOSIS — J30.2 SEASONAL ALLERGIES: ICD-10-CM

## 2025-01-09 DIAGNOSIS — E03.9 ACQUIRED HYPOTHYROIDISM: Chronic | ICD-10-CM

## 2025-01-09 DIAGNOSIS — J45.20 MILD INTERMITTENT ASTHMA WITHOUT COMPLICATION: ICD-10-CM

## 2025-01-15 NOTE — PATIENT COMMUNICATION
Received request via: Patient    Was the patient seen in the last year in this department? Yes    Does the patient have an active prescription (recently filled or refills available) for medication(s) requested? No    Pharmacy Name: new pharmacy for 20[facility], patient needs refills on all meds     Does the patient have custodial Plus and need 100-day supply? (This applies to ALL medications) Patient does not have SCP

## 2025-01-17 RX ORDER — LEVOTHYROXINE SODIUM 137 UG/1
137 TABLET ORAL
Qty: 90 TABLET | Refills: 3 | Status: SHIPPED | OUTPATIENT
Start: 2025-01-17 | End: 2025-01-22 | Stop reason: SDUPTHER

## 2025-01-17 RX ORDER — ALBUTEROL SULFATE 90 UG/1
2 INHALANT RESPIRATORY (INHALATION) EVERY 4 HOURS PRN
Qty: 2 EACH | Refills: 2 | Status: SHIPPED | OUTPATIENT
Start: 2025-01-17 | End: 2025-01-23 | Stop reason: SDUPTHER

## 2025-01-17 RX ORDER — ESCITALOPRAM OXALATE 20 MG/1
TABLET ORAL
Qty: 90 TABLET | Refills: 3 | Status: SHIPPED | OUTPATIENT
Start: 2025-01-17 | End: 2025-01-22 | Stop reason: SDUPTHER

## 2025-01-17 RX ORDER — MONTELUKAST SODIUM 10 MG/1
10 TABLET ORAL DAILY
Qty: 90 TABLET | Refills: 3 | Status: SHIPPED | OUTPATIENT
Start: 2025-01-17 | End: 2025-01-22 | Stop reason: SDUPTHER

## 2025-01-22 DIAGNOSIS — F33.0 MILD EPISODE OF RECURRENT MAJOR DEPRESSIVE DISORDER (HCC): ICD-10-CM

## 2025-01-22 DIAGNOSIS — E03.9 ACQUIRED HYPOTHYROIDISM: Chronic | ICD-10-CM

## 2025-01-22 DIAGNOSIS — J30.2 SEASONAL ALLERGIES: ICD-10-CM

## 2025-01-22 RX ORDER — LEVOTHYROXINE SODIUM 137 UG/1
137 TABLET ORAL
Qty: 90 TABLET | Refills: 3 | Status: SHIPPED | OUTPATIENT
Start: 2025-01-22

## 2025-01-22 RX ORDER — MONTELUKAST SODIUM 10 MG/1
10 TABLET ORAL DAILY
Qty: 90 TABLET | Refills: 3 | Status: SHIPPED | OUTPATIENT
Start: 2025-01-22

## 2025-01-22 RX ORDER — ESCITALOPRAM OXALATE 20 MG/1
TABLET ORAL
Qty: 90 TABLET | Refills: 3 | Status: SHIPPED | OUTPATIENT
Start: 2025-01-22

## 2025-01-23 DIAGNOSIS — J45.20 MILD INTERMITTENT ASTHMA WITHOUT COMPLICATION: ICD-10-CM

## 2025-01-23 RX ORDER — ALBUTEROL SULFATE 90 UG/1
2 INHALANT RESPIRATORY (INHALATION) EVERY 4 HOURS PRN
Qty: 2 EACH | Refills: 2 | Status: SHIPPED | OUTPATIENT
Start: 2025-01-23

## 2025-02-18 ENCOUNTER — HOSPITAL ENCOUNTER (OUTPATIENT)
Dept: LAB | Facility: MEDICAL CENTER | Age: 60
End: 2025-02-18
Attending: INTERNAL MEDICINE
Payer: COMMERCIAL

## 2025-02-18 PROCEDURE — 36415 COLL VENOUS BLD VENIPUNCTURE: CPT

## 2025-02-18 PROCEDURE — 82607 VITAMIN B-12: CPT

## 2025-02-18 PROCEDURE — 84439 ASSAY OF FREE THYROXINE: CPT

## 2025-02-18 PROCEDURE — 82306 VITAMIN D 25 HYDROXY: CPT

## 2025-02-18 PROCEDURE — 84443 ASSAY THYROID STIM HORMONE: CPT

## 2025-02-18 PROCEDURE — 84481 FREE ASSAY (FT-3): CPT

## 2025-02-19 LAB
25(OH)D3 SERPL-MCNC: 39 NG/ML (ref 30–100)
T3FREE SERPL-MCNC: 2.72 PG/ML (ref 2–4.4)
T4 FREE SERPL-MCNC: 1.68 NG/DL (ref 0.93–1.7)
TSH SERPL-ACNC: 2.85 UIU/ML (ref 0.35–5.5)
VIT B12 SERPL-MCNC: 637 PG/ML (ref 211–911)

## 2025-03-03 ENCOUNTER — HOSPITAL ENCOUNTER (OUTPATIENT)
Dept: LAB | Facility: MEDICAL CENTER | Age: 60
End: 2025-03-03
Attending: INTERNAL MEDICINE
Payer: COMMERCIAL

## 2025-03-03 LAB
DHEA-S SERPL-MCNC: 137 UG/DL (ref 18.9–205)
ESTRADIOL SERPL-MCNC: <5 PG/ML
FSH SERPL-ACNC: 64.1 MIU/ML
LH SERPL-ACNC: 30.8 IU/L
VIT B12 SERPL-MCNC: 617 PG/ML (ref 211–911)

## 2025-03-03 PROCEDURE — 82626 DEHYDROEPIANDROSTERONE: CPT

## 2025-03-03 PROCEDURE — 84270 ASSAY OF SEX HORMONE GLOBUL: CPT

## 2025-03-03 PROCEDURE — 82157 ASSAY OF ANDROSTENEDIONE: CPT

## 2025-03-03 PROCEDURE — 82607 VITAMIN B-12: CPT

## 2025-03-03 PROCEDURE — 84403 ASSAY OF TOTAL TESTOSTERONE: CPT

## 2025-03-03 PROCEDURE — 83002 ASSAY OF GONADOTROPIN (LH): CPT

## 2025-03-03 PROCEDURE — 82627 DEHYDROEPIANDROSTERONE: CPT

## 2025-03-03 PROCEDURE — 36415 COLL VENOUS BLD VENIPUNCTURE: CPT

## 2025-03-03 PROCEDURE — 83001 ASSAY OF GONADOTROPIN (FSH): CPT

## 2025-03-03 PROCEDURE — 82670 ASSAY OF TOTAL ESTRADIOL: CPT

## 2025-03-05 LAB — SHBG SERPL-SCNC: 51 NMOL/L (ref 17–125)

## 2025-03-07 LAB
ANDROST SERPL-MCNC: 0.33 NG/ML (ref 0.13–0.82)
DHEA SERPL-MCNC: 2.59 NG/ML (ref 0.63–4.7)
TESTOST SERPL-MCNC: 13 NG/DL (ref 9–55)

## 2025-03-11 DIAGNOSIS — E03.9 ACQUIRED HYPOTHYROIDISM: Chronic | ICD-10-CM

## 2025-03-18 ENCOUNTER — HOSPITAL ENCOUNTER (OUTPATIENT)
Dept: RADIOLOGY | Facility: MEDICAL CENTER | Age: 60
End: 2025-03-18
Attending: STUDENT IN AN ORGANIZED HEALTH CARE EDUCATION/TRAINING PROGRAM
Payer: COMMERCIAL

## 2025-03-18 DIAGNOSIS — Z12.31 ENCOUNTER FOR SCREENING MAMMOGRAM FOR MALIGNANT NEOPLASM OF BREAST: ICD-10-CM

## 2025-03-18 PROCEDURE — 77067 SCR MAMMO BI INCL CAD: CPT

## 2025-03-18 NOTE — Clinical Note
REFERRAL APPROVAL NOTICE         Sent on March 17, 2025                   Ruby Diehl Heribertokevinhermilo  [address] [place] [facility] [place] [address]                   Dear Ms. Sue,    After a careful review of the medical information and benefit coverage, [facility] has processed your referral. See below for additional details.    If applicable, you must be actively enrolled with your insurance for coverage of the authorized service. If you have any questions regarding your coverage, please contact your insurance directly.    REFERRAL INFORMATION   Referral #:  [id number]  Referred-To Department    Referred-By Provider:  Endocrinology    Ashanti Figueroa M.D.   [place] [facility] [place]      [address] [facility] [place] [address]  [phone] [address], [address]  [place] [address] [phone]    Referral Start Date:  03/11/2025  Referral End Date:   03/11/2026           SCHEDULING  If you do not already have an appointment, please call [phone] to make an appointment.   MORE INFORMATION  As a reminder, [facility] ownership has changed, meaning this location is now owned and operated by [facility]. As such, we want to clarify that our patients should expect to receive two separate bills for the services received at [facility] - one representing the [facility] facility fees as the owner of the establishment, and the other to represent the physician's services and subsequent fees. You can speak with your insurance carrier for a pricing estimate by calling the customer service number on the back of your card and ask about charges for a hospital outpatient visit.  If you do not already have a [employer] account, sign up at: [email or web address]  You can access your medical information, make appointments, see lab results, billing information, and more.  If you have questions regarding this referral, please contact  the [facility] Referrals department at:              [phone]. Monday - Friday 7:30AM - 5:00PM.      Sincerely,  [facility]

## 2025-03-24 ENCOUNTER — RESULTS FOLLOW-UP (OUTPATIENT)
Dept: MEDICAL GROUP | Facility: PHYSICIAN GROUP | Age: 60
End: 2025-03-24

## 2025-04-28 ENCOUNTER — HOSPITAL ENCOUNTER (OUTPATIENT)
Dept: LAB | Facility: MEDICAL CENTER | Age: 60
End: 2025-04-28
Attending: INTERNAL MEDICINE
Payer: COMMERCIAL

## 2025-04-28 PROCEDURE — 84481 FREE ASSAY (FT-3): CPT

## 2025-04-28 PROCEDURE — 84439 ASSAY OF FREE THYROXINE: CPT

## 2025-04-28 PROCEDURE — 36415 COLL VENOUS BLD VENIPUNCTURE: CPT

## 2025-04-28 PROCEDURE — 82306 VITAMIN D 25 HYDROXY: CPT

## 2025-04-28 PROCEDURE — 84443 ASSAY THYROID STIM HORMONE: CPT

## 2025-04-28 PROCEDURE — 86376 MICROSOMAL ANTIBODY EACH: CPT

## 2025-04-29 LAB
25(OH)D3 SERPL-MCNC: 47 NG/ML (ref 30–100)
T3FREE SERPL-MCNC: 3.22 PG/ML (ref 2–4.4)
T4 FREE SERPL-MCNC: 1.66 NG/DL (ref 0.93–1.7)
THYROPEROXIDASE AB SERPL-ACNC: 11.7 IU/ML (ref 0–9)
TSH SERPL-ACNC: 0.34 UIU/ML (ref 0.38–5.33)

## 2025-05-09 ENCOUNTER — OFFICE VISIT (OUTPATIENT)
Dept: ENDOCRINOLOGY | Facility: MEDICAL CENTER | Age: 60
End: 2025-05-09
Attending: INTERNAL MEDICINE
Payer: COMMERCIAL

## 2025-05-09 VITALS
SYSTOLIC BLOOD PRESSURE: 126 MMHG | DIASTOLIC BLOOD PRESSURE: 86 MMHG | BODY MASS INDEX: 33.03 KG/M2 | HEIGHT: 66 IN | OXYGEN SATURATION: 96 % | HEART RATE: 77 BPM | WEIGHT: 205.5 LBS

## 2025-05-09 DIAGNOSIS — E55.9 VITAMIN D DEFICIENCY: ICD-10-CM

## 2025-05-09 DIAGNOSIS — E66.9 OBESITY (BMI 35.0-39.9 WITHOUT COMORBIDITY): Chronic | ICD-10-CM

## 2025-05-09 DIAGNOSIS — E89.0 POSTABLATIVE HYPOTHYROIDISM: ICD-10-CM

## 2025-05-09 PROCEDURE — 99212 OFFICE O/P EST SF 10 MIN: CPT | Performed by: INTERNAL MEDICINE

## 2025-05-09 RX ORDER — LIOTHYRONINE SODIUM 5 UG/1
5 TABLET ORAL DAILY
Qty: 90 TABLET | Refills: 2 | Status: SHIPPED | OUTPATIENT
Start: 2025-05-09

## 2025-05-09 RX ORDER — PHENTERMINE HYDROCHLORIDE 37.5 MG/1
37.5 TABLET ORAL DAILY
Qty: 30 TABLET | Refills: 5 | Status: SHIPPED | OUTPATIENT
Start: 2025-05-09 | End: 2025-06-08

## 2025-05-09 RX ORDER — LIOTHYRONINE SODIUM 50 UG/1
TABLET ORAL
COMMUNITY
Start: 2025-02-24 | End: 2025-05-09

## 2025-05-09 RX ORDER — LEVOTHYROXINE SODIUM 137 UG/1
137 TABLET ORAL
Qty: 90 TABLET | Refills: 2 | Status: SHIPPED | OUTPATIENT
Start: 2025-05-09

## 2025-05-09 ASSESSMENT — FIBROSIS 4 INDEX: FIB4 SCORE: 1.17

## 2025-05-09 NOTE — PROGRESS NOTES
Chief Complaint: Consult requested by Ashanti Figueroa M.D. for evaluation of Hypothyroidism    HPI:     Ruby Sue is a 59 y.o. female with postablative hypothyroidism    She has of Hypothyroidism after OQUENDO for Graves' dse in 2001 and is here for initial evaluation.    She saw Dr. Monaco and was treated with brand name Synthroid    She saw Dr. Auguste and was placed on combination therapy with Levothyroxine and Cytomel    She wanted to switch providers and this is why she is here    She is on Levothyroxine 137 mcg daily and Cytomel 5mcg daily which has been her thyroid hormone dose since 3 months.   She reports  recent dose changes  ( addition of Cytomel) . She reports Good compliance and takes her thyroid hormone daily before breakfast.  She denies taking any iron, calcium supplements or antacids.       She reports good energy levels.   She denies palpitations and tremors.  She denies lumps or enlargement in the neck.  She reports chronic constipation nut sometimes she has diarrhea.  She has not been formally diagnosed with IBS       Latest Reference Range & Units 04/28/25 10:40   TSH 0.380 - 5.330 uIU/mL 0.340 (L)   Free T-4 0.93 - 1.70 ng/dL 1.66   T3,Free 2.00 - 4.40 pg/mL 3.22          Latest Reference Range & Units 04/28/25 10:40   25-Hydroxy   Vitamin D 25 30 - 100 ng/mL 47             Patient's medications, allergies, and social histories were reviewed and updated as appropriate.      ROS:     CONS:     No fever, no chills, no weight loss, no fatigue   EYES:      No diplopia, no blurry vision, no redness of eyes, no swelling of eyelids   ENT:    No hearing loss, No ear pain, No sore throat, no dysphagia, no neck swelling   CV:     No chest pain, no palpitations, no claudication, no orthopnea, no PND   PULM:    No SOB, no cough, no hemoptysis, no wheezing    GI:   No nausea, no vomiting, no diarrhea, no constipation, no bloody stools   :  Passing urine well, no dysuria, no hematuria   ENDO:   No  polyuria, no polydipsia, no heat intolerance, no cold intolerance   NEURO: No headaches, no dizziness, no convulsions, no tremors   MUSC:  No joint swellings, no arthralgias, no myalgias, no weakness   SKIN:   No rash, no ulcers, no dry skin   PSYCH:   No depression, no anxiety, no difficulty sleeping       Past Medical History:  Patient Active Problem List    Diagnosis Date Noted    Insulin resistance 12/04/2024    Class 2 severe obesity with serious comorbidity and body mass index (BMI) of 35.0 to 35.9 in adult, unspecified obesity type (HCC) 09/05/2024    Family history of premature coronary artery disease 09/05/2024    Primary hypercholesterolemia 09/04/2024    Postmenopausal bleeding 03/26/2024    Left knee pain 11/28/2023    Impingement syndrome involving patellar fat pad 11/28/2023    Bilateral chronic knee pain 07/17/2023    Prediabetes 02/17/2022    Seasonal allergies 05/14/2019    MDD (major depressive disorder) 12/08/2017    Mixed hyperlipidemia 04/30/2017    Mild intermittent asthma without complication 04/30/2017    Obesity (BMI 35.0-39.9 without comorbidity) 02/07/2016    Acquired hypothyroidism 09/20/2009       Past Surgical History:  Past Surgical History:   Procedure Laterality Date    PB KNEE SCOPE,PART SYNOVECT Left 1/8/2024    Procedure: LEFT KNEE ARTHROSCOPY,LEFT POSTERIOR FAT PAD DEBRIDMENT  REPAIRS AS INDICATED;  Surgeon: Julian Wylie M.D.;  Location: [facility];  Service: Orthopedics    ND BREAST REDUCTION      TONSILLECTOMY      TUBAL COAGULATION LAPAROSCOPIC BILATERAL          Allergies:  Cleocin [clindamycin hcl] and Pcn [penicillins]     Current Medications:    Current Outpatient Medications:     liothyronine (CYTOMEL) 50 MCG tablet, , Disp: , Rfl:     albuterol (VENTOLIN HFA) 108 (90 Base) MCG/ACT Aero Soln inhalation aerosol, Inhale 2 Puffs every four hours as needed for Shortness of Breath., Disp: 2 Each, Rfl: 2    escitalopram (LEXAPRO) 20 MG tablet, TAKE ONE  TABLET BY MOUTH DAILY, Disp: 90 Tablet, Rfl: 3    levothyroxine (SYNTHROID) 137 MCG Tab, Take 1 Tablet by mouth every morning on an empty stomach., Disp: 90 Tablet, Rfl: 3    montelukast (SINGULAIR) 10 MG Tab, Take 1 Tablet by mouth every day., Disp: 90 Tablet, Rfl: 3    phentermine (ADIPEX-P) 37.5 MG tablet, Take 37.5 mg by mouth every day., Disp: , Rfl:     meloxicam (MOBIC) 15 MG tablet, Take 1 Tablet by mouth every day., Disp: 30 Tablet, Rfl: 1    Social History:  Social History     Socioeconomic History    Marital status:      Spouse name: Not on file    Number of children: 4    Years of education: Not on file    Highest education level: 12th grade   Occupational History    Occupation: [employer]   Tobacco Use    Smoking status: Former     Types: Cigarettes    Smokeless tobacco: Never    Tobacco comments:     Smoked on/off for 10-15 years, 1/2-1 ppd   Vaping Use    Vaping status: Never Used   Substance and Sexual Activity    Alcohol use: Yes     Alcohol/week: 1.2 oz     Types: 2 Glasses of wine per week    Drug use: No    Sexual activity: Not Currently     Partners: Male     Birth control/protection: Female Sterilization     Comment:    Other Topics Concern    Not on file   Social History Narrative    4 children. Previously worked at 's dept now at [employer] office.      Social Drivers of Health     Financial Resource Strain: Low Risk  (12/28/2022)    Overall Financial Resource Strain (CARDIA)     Difficulty of Paying Living Expenses: Not hard at all   Food Insecurity: No Food Insecurity (12/28/2022)    Hunger Vital Sign     Worried About Running Out of Food in the Last Year: Never true     Ran Out of Food in the Last Year: Never true   Transportation Needs: No Transportation Needs (12/28/2022)    PRAPARE - Transportation     Lack of Transportation (Medical): No     Lack of Transportation (Non-Medical): No   Physical Activity: Sufficiently Active (4/23/2024)    Exercise Vital Sign     Days of Exercise  "per Week: 4 days     Minutes of Exercise per Session: 40 min   Stress: No Stress Concern Present (2022)    [employer] - Occupational Stress Questionnaire     Feeling of Stress : Only a little   Social Connections: Unknown (2024)    Social Connection and Isolation Panel [NHANES]     Frequency of Communication with Friends and Family: Not on file     Frequency of Social Gatherings with Friends and Family: Not on file     Attends Zoroastrian Services: Not on file     Active Member of Clubs or Organizations: No     Attends Club or Organization Meetings: Not on file     Marital Status:    Intimate Partner Violence: Not on file   Housing Stability: High Risk (2022)    Housing Stability Vital Sign     Unable to Pay for Housing in the Last Year: No     Number of Places Lived in the Last Year: 5     Unstable Housing in the Last Year: No        Family History:   Family History   Problem Relation Age of Onset    Diabetes Mother 50        T2    Heart Disease Mother 60        PCI few times, MI    Transient ischemic attack Father 81    Cancer Father         skin, non-melanoma    Cancer Sister         skin, melanoma    Hyperlipidemia Sister     Heart Disease Sister         elevated CACS    No Known Problems Brother     Heart Disease Maternal Grandmother 58         - MIs, valvular dz    Stroke Maternal Grandmother     Asthma Maternal Grandfather     Cancer Paternal Grandmother         lung, former smoker    No Known Problems Daughter     Asthma Daughter     No Known Problems Daughter     No Known Problems Daughter     Breast Cancer Neg Hx     Colorectal Cancer Neg Hx     Peritoneal Cancer Neg Hx     Tubal Cancer Neg Hx     Ovarian Cancer Neg Hx          PHYSICAL EXAM:   Vital signs: /86   Pulse 77   Ht 1.676 m (5' 6\")   Wt 93.2 kg (205 lb 8 oz)   SpO2 96%   BMI 33.17 kg/m²   GENERAL: Well-developed, well-nourished  in no apparent distress.   EYE: No ocular and " eyelid asymmetry, Anicteric sclerae,  PERRL  HENT: Hearing grossly intact, Normocephalic, atraumatic. Pink, moist mucous membranes, No exudate  NECK: Supple. Trachea midline. thyroid is not palpable   CARDIOVASCULAR: Regular rate and rhythm. No murmurs, rubs, or gallops.   LUNGS: Clear to auscultation bilaterally   ABDOMEN: Soft, nontender with positive bowel sounds.   EXTREMITIES: No clubbing, cyanosis, or edema.   NEUROLOGICAL: Cranial nerves II-XII are grossly intact   Symmetric reflexes at the patella no proximal muscle weakness  LYMPH: No cervical, supraclavicular,  adenopathy palpated.   SKIN: No rashes, lesions. Turgor is normal.    Labs:  Lab Results   Component Value Date/Time    WBC 4.8 04/19/2024 09:49 AM    RBC 4.63 04/19/2024 09:49 AM    HEMOGLOBIN 14.4 04/19/2024 09:49 AM    MCV 91.4 04/19/2024 09:49 AM    MCH 31.1 04/19/2024 09:49 AM    MCHC 34.0 04/19/2024 09:49 AM    RDW 44.7 04/19/2024 09:49 AM    MPV 11.3 04/19/2024 09:49 AM       Lab Results   Component Value Date/Time    SODIUM 140 04/19/2024 09:49 AM    POTASSIUM 4.8 04/19/2024 09:49 AM    CHLORIDE 105 04/19/2024 09:49 AM    CO2 24 04/19/2024 09:49 AM    ANION 11.0 04/19/2024 09:49 AM    GLUCOSE 108 (H) 04/19/2024 09:49 AM    BUN 17 04/19/2024 09:49 AM    CREATININE 0.70 04/19/2024 09:49 AM    CALCIUM 9.1 04/19/2024 09:49 AM    ASTSGOT 22 04/19/2024 09:49 AM    ALTSGPT 16 04/19/2024 09:49 AM    TBILIRUBIN 0.6 04/19/2024 09:49 AM    ALBUMIN 4.5 04/19/2024 09:49 AM    TOTPROTEIN 7.2 04/19/2024 09:49 AM    GLOBULIN 2.7 04/19/2024 09:49 AM    AGRATIO 1.7 04/19/2024 09:49 AM       Lab Results   Component Value Date/Time    CHOLSTRLTOT 215 (H) 11/18/2024 0750    TRIGLYCERIDE 107 11/18/2024 0750    HDL 54 11/18/2024 0750     (H) 11/18/2024 0750       Lab Results   Component Value Date/Time    TSHULTRASEN 0.340 (L) 04/28/2025 1040     Lab Results   Component Value Date/Time    FREET4 1.66 04/28/2025 1040     Lab Results   Component Value  "Date/Time    FREET3 3.22 04/28/2025 1040     No results found for: \"THYSTIMIG\"    Lab Results   Component Value Date/Time    MICROSOMALA 11.7 (H) 04/28/2025 1040         Imaging:      ASSESSMENT/PLAN:     1. Postablative hypothyroidism  Controlled  Reviewed overview of therapy for hypothyroidism  I went over her labs  Discussed benefits of combination therapy with levothyroxine and liothyronine  Even though her TSH is slightly low this is acceptable   I explained to her that measuring the TPO antibodies is not medically necessary Nor helpful as we already know that she has hypothyroidism from radioactive iodine therapy and it does not change our overall management  Continue levothyroxine 137 mcg daily and Cytomel 5 mcg daily  Reviewed proper administration of thyroid hormone  Patient should take thyroid hormone 30-60 minutes before breakfast on an empty stomach plain water and not take it together with food, iron, calcium, and antacids.  Iron, calcium, and antacids should be taken at least 4 hours apart from thyroid hormone.  Follow-up in 7 months with repeat thyroid labs    2. Vitamin D deficiency  Stable   Vitamin D labs were reviewed with patient  Continue current supplements vitamin D3 2000 IU daily  Continue monitoring levels       3. Obesity (BMI 35.0-39.9 without comorbidity)  Stable  She has achieved significant weight loss with phentermine therapy  Reviewed side effects of phentermine  Continue current medication recommend that she combine this with diet and exercise and lifestyle changes  Continue monitoring weight    Return in about 7 months (around 12/9/2025).        Thank you kindly for allowing me to participate in the thyroid care plan for this patient.    Indio Rankin MD, [facility], [facility]  05/09/25    CC:   Ashanti Figueroa M.D.    "

## 2025-05-13 ENCOUNTER — HOSPITAL ENCOUNTER (OUTPATIENT)
Dept: RADIOLOGY | Facility: MEDICAL CENTER | Age: 60
End: 2025-05-13
Attending: FAMILY MEDICINE
Payer: COMMERCIAL

## 2025-05-13 DIAGNOSIS — Z91.89 OTHER SPECIFIED PERSONAL RISK FACTORS, NOT ELSEWHERE CLASSIFIED: ICD-10-CM

## 2025-05-13 PROCEDURE — 4410556 CT-CARDIAC SCORING (SELF PAY ONLY)

## 2025-05-15 ENCOUNTER — RESULTS FOLLOW-UP (OUTPATIENT)
Dept: VASCULAR LAB | Facility: MEDICAL CENTER | Age: 60
End: 2025-05-15
Payer: COMMERCIAL